# Patient Record
Sex: MALE | Race: WHITE | NOT HISPANIC OR LATINO | Employment: OTHER | URBAN - METROPOLITAN AREA
[De-identification: names, ages, dates, MRNs, and addresses within clinical notes are randomized per-mention and may not be internally consistent; named-entity substitution may affect disease eponyms.]

---

## 2017-03-10 DIAGNOSIS — I10 ESSENTIAL HYPERTENSION: ICD-10-CM

## 2017-03-10 RX ORDER — LISINOPRIL 40 MG/1
40 TABLET ORAL DAILY
Qty: 90 TABLET | Refills: 2 | Status: SHIPPED | OUTPATIENT
Start: 2017-03-10 | End: 2017-09-18

## 2017-03-10 NOTE — TELEPHONE ENCOUNTER
lisinopril      Last Written Prescription Date: 09/12/16  Last Fill Quantity: 90, # refills: 1  Last Office Visit with [facility], [facility] or [facility] prescribing provider: 12/12/16  Next 5 appointments (look out 90 days)     Mar 23, 2017  9:15 AM CDT   Return Visit with Duc Du MD   [facility] [place] ([facility])    [address] [place] [address] [id number] [phone]                   Potassium   Date Value Ref Range Status   12/16/2016 3.6 3.5 - 5.1 mmol/L Final     Creatinine   Date Value Ref Range Status   12/16/2016 0.98 0.70 - 1.30 mg/dL Final     BP Readings from Last 3 Encounters:   12/16/16 136/74   12/12/16 138/74   12/02/16 (!) 156/95

## 2017-03-16 ENCOUNTER — MYC MEDICAL ADVICE (OUTPATIENT)
Dept: INTERNAL MEDICINE | Facility: CLINIC | Age: 67
End: 2017-03-16

## 2017-03-16 DIAGNOSIS — I21.4 NSTEMI (NON-ST ELEVATED MYOCARDIAL INFARCTION) (H): ICD-10-CM

## 2017-03-16 RX ORDER — ATORVASTATIN CALCIUM 80 MG/1
80 TABLET, FILM COATED ORAL AT BEDTIME
Qty: 90 TABLET | Refills: 2 | Status: SHIPPED | OUTPATIENT
Start: 2017-03-16 | End: 2017-03-23

## 2017-03-16 RX ORDER — METOPROLOL SUCCINATE 50 MG/1
75 TABLET, EXTENDED RELEASE ORAL EVERY MORNING
Qty: 135 TABLET | Refills: 2 | Status: SHIPPED | OUTPATIENT
Start: 2017-03-16 | End: 2017-09-18

## 2017-03-23 ENCOUNTER — OFFICE VISIT (OUTPATIENT)
Dept: CARDIOLOGY | Facility: CLINIC | Age: 67
End: 2017-03-23
Attending: NURSE PRACTITIONER
Payer: COMMERCIAL

## 2017-03-23 VITALS
WEIGHT: 261.1 LBS | DIASTOLIC BLOOD PRESSURE: 70 MMHG | SYSTOLIC BLOOD PRESSURE: 130 MMHG | HEART RATE: 66 BPM | HEIGHT: 69 IN | BODY MASS INDEX: 38.67 KG/M2

## 2017-03-23 DIAGNOSIS — E78.5 HYPERLIPIDEMIA LDL GOAL <70: ICD-10-CM

## 2017-03-23 DIAGNOSIS — I21.4 NSTEMI (NON-ST ELEVATED MYOCARDIAL INFARCTION) (H): ICD-10-CM

## 2017-03-23 DIAGNOSIS — I10 ESSENTIAL HYPERTENSION: Primary | ICD-10-CM

## 2017-03-23 DIAGNOSIS — I35.0 NONRHEUMATIC AORTIC VALVE STENOSIS: ICD-10-CM

## 2017-03-23 LAB
ALT SERPL W P-5'-P-CCNC: <5 U/L (ref 5–30)
CHOLEST SERPL-MCNC: 177 MG/DL
HDLC SERPL-MCNC: 41 MG/DL
LDLC SERPL CALC-MCNC: 94 MG/DL
NONHDLC SERPL-MCNC: 136 MG/DL
TRIGL SERPL-MCNC: 210 MG/DL

## 2017-03-23 PROCEDURE — 84460 ALANINE AMINO (ALT) (SGPT): CPT | Performed by: NURSE PRACTITIONER

## 2017-03-23 PROCEDURE — 99214 OFFICE O/P EST MOD 30 MIN: CPT | Performed by: INTERNAL MEDICINE

## 2017-03-23 PROCEDURE — 36415 COLL VENOUS BLD VENIPUNCTURE: CPT | Performed by: NURSE PRACTITIONER

## 2017-03-23 PROCEDURE — 80061 LIPID PANEL: CPT | Performed by: NURSE PRACTITIONER

## 2017-03-23 RX ORDER — ROSUVASTATIN CALCIUM 20 MG/1
40 TABLET, COATED ORAL DAILY
Qty: 90 TABLET | Refills: 3 | Status: SHIPPED | OUTPATIENT
Start: 2017-03-23 | End: 2017-03-23

## 2017-03-23 RX ORDER — AMLODIPINE BESYLATE 10 MG/1
10 TABLET ORAL DAILY
Qty: 90 TABLET | Refills: 3 | Status: SHIPPED | OUTPATIENT
Start: 2017-03-23 | End: 2017-06-12

## 2017-03-23 RX ORDER — ROSUVASTATIN CALCIUM 40 MG/1
40 TABLET, COATED ORAL DAILY
Qty: 90 TABLET | Refills: 3 | Status: SHIPPED | OUTPATIENT
Start: 2017-03-23 | End: 2018-03-30

## 2017-03-23 NOTE — LETTER
3/23/2017    Sinan Stephens MD  [facility]  [address] [place] [address]    RE: Giacomo Arguelloside       Dear Colleague,    I had the pleasure of seeing Giacomo Solis in the [facility].    Mr. Solis is a pleasant 67-year-old gentleman who has been hospitalized from 11/30 to 12/02 with an NSTEMI and found to have mild to moderate aortic valve stenosis.  He underwent a cardiac catheterization that showed just moderate disease, so no intervention was performed.  He was found to have diastolic dysfunction and probably hypertension may have contributed to his mild troponin elevation, which was only at 0.05.  His statin was increased from 40 to 80 of atorvastatin; however, on recheck today his LDL still remains high 94.  He has not been on Crestor.  In the past, he has a history of atrial fibrillation but was unable to tolerate anticoagulation because of a GI bleed.  He was on Xarelto before that bleed.  His echo shows a mean gradient of around 18 with a highly calcified aortic valve.  He also has mild ascending aortic dilatation at 4.1 cm.  His blood pressure, he brings readings from home, not ideally controlled.  He reports he probably misses some of his doses in the evening, which may contribute to that.  We discussed that he could take all of his medications once a day, and there is no problem with that, so that may help with compliance.  He is not doing much in the way of diet and exercise but knows that that is important.  He recently retired.  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     Outpatient Encounter Prescriptions as of 3/23/2017   Medication Sig Dispense Refill     amLODIPine (NORVASC) 10 MG tablet Take 1 tablet (10 mg) by mouth daily 90 tablet 3     rosuvastatin (CRESTOR) 40 MG tablet Take 1 tablet (40 mg) by mouth daily 90 tablet 3      metoprolol (TOPROL-XL) 50 MG 24 hr tablet Take 1.5 tablets (75 mg) by mouth every morning 135 tablet 2     lisinopril (PRINIVIL/ZESTRIL) 40 MG tablet Take 1 tablet (40 mg) by mouth daily 90 tablet 2     furosemide (LASIX) 20 MG tablet Take 2 tablets (40 mg) by mouth every morning 180 tablet 2     [DISCONTINUED] omeprazole (PRILOSEC) 20 MG capsule Take 1 capsule (20 mg) by mouth daily 90 capsule 1     fluorouracil (EFUDEX) 5 % cream Apply topically 2 times daily Twice daily for two weeks this fall to face 40 g 0     aspirin 81 MG tablet Take 1 tablet by mouth daily.       [DISCONTINUED] rosuvastatin (CRESTOR) 20 MG tablet Take 2 tablets (40 mg) by mouth daily 90 tablet 3     [DISCONTINUED] atorvastatin (LIPITOR) 80 MG tablet Take 1 tablet (80 mg) by mouth At Bedtime 90 tablet 2     [DISCONTINUED] amLODIPine (NORVASC) 2.5 MG tablet Take 1 tablet (2.5 mg) by mouth daily 90 tablet 1     No facility-administered encounter medications on file as of 3/23/2017.       IMPRESSION, REPORT AND PLAN:   1.  Moderate coronary artery disease, medically managed without symptoms of chest pain or discomfort.   2.  Hypertension, not ideally controlled.   3.  History of GI bleed, not on anticoagulation as a result.   4.  Atrial fibrillation, paroxysmal.   5.  Hyperlipidemia, not ideally controlled currently on Lipitor 80.   6.  Obesity.   7.  Mild aortic valve stenosis with a mean gradient of 18.   8.  Ascending aortic dilatation at 4.1 cm.   9.  Obesity.      DISCUSSION:  It was a pleasure seeing Mr. Solis in Cardiology Clinic today.  I discussed his interval history since I last saw him and reviewed all of his testing.  We discussed that I would like him to have ideally better blood pressure control.  I have upped his Norvasc from 2.5 to 10, and he will keep an eye on his blood pressure.  We discussed that he can take all of his medications at once.  He does need to stagger them, which he was glad to hear.  We discussed the  importance of diet and exercise, which he understands.  From the standpoint of his aortic valve, we will plan to follow that up in a year with an echo.  We can look at his aorta at that time as well.  I did switch him from Lipitor 80 to Crestor 40 given his ongoing elevation in his LDL which is not ideally controlled.  We will plan to see him back in a year or, of course, sooner if he has any issues prior.  It certainly was a pleasure to see him.  Please do not hesitate to contact me with any questions or concerns.     Again, thank you for allowing me to participate in the care of your patient.      Sincerely,    Duc Du MD     [facility]

## 2017-03-23 NOTE — PROGRESS NOTES
HPI:     Please see dictated note    PAST MEDICAL HISTORY:  Past Medical History:   Diagnosis Date     Atrial fibrillation (H)     paroxysmal     Basal cell carcinoma      Coronary artery disease involving native coronary artery of native heart 11/30/16    NSTEMI (peak troponin 0.05); coronary angiogram showed modeerate nonocclusive disease: LM 30-40 %, LAD 30-40%, CX 30-40 %     Diverticulosis of colon 4/16/12    sigmoid diverticulosis per CT scan; episode acute diverticulitis per CT scan     Heart valve insufficiency      Lipoma of other skin and subcutaneous tissue      Lumbar spinal stenosis 9/1/10    lumbar spinal stenosis at L5     NSTEMI (non-ST elevated myocardial infarction) (H) 11/30/16    NSTEMI (peak troponin 0.05); no acute lesions on angiogram     Obesity (BMI 30-39.9) 7/9/13    BMI 38     Obstructive sleep apnea 10/7/14    per sleep study:  AHI 25/hr, desats to 84%. Supine /hr, only 6 minutes spent supine     Other and unspecified hyperlipidemia      Prediabetes 7/9/13    elevated fasting glucoses     Tubular adenoma of colon 8/13/13    3 adenomatous poltyps removed, 4 hyperplastic polyps removed     Unspecified essential hypertension        CURRENT MEDICATIONS:  Current Outpatient Prescriptions   Medication Sig Dispense Refill     atorvastatin (LIPITOR) 80 MG tablet Take 1 tablet (80 mg) by mouth At Bedtime 90 tablet 2     metoprolol (TOPROL-XL) 50 MG 24 hr tablet Take 1.5 tablets (75 mg) by mouth every morning 135 tablet 2     lisinopril (PRINIVIL/ZESTRIL) 40 MG tablet Take 1 tablet (40 mg) by mouth daily 90 tablet 2     amLODIPine (NORVASC) 2.5 MG tablet Take 1 tablet (2.5 mg) by mouth daily 90 tablet 1     furosemide (LASIX) 20 MG tablet Take 2 tablets (40 mg) by mouth every morning 180 tablet 2     omeprazole (PRILOSEC) 20 MG capsule Take 1 capsule (20 mg) by mouth daily 90 capsule 1     fluorouracil (EFUDEX) 5 % cream Apply topically 2 times daily Twice daily for two weeks this fall to  "face 40 g 0     aspirin 81 MG tablet Take 1 tablet by mouth daily.         PAST SURGICAL HISTORY:  Past Surgical History:   Procedure Laterality Date     COLONOSCOPY  8/13/13    3 adenomatous polyps and 4 hyperplastic polyps removed     HC TOOTH EXTRACTION W/FORCEP  1983    4 wisdom teeth removed withou difficulty       ALLERGIES     Allergies   Allergen Reactions     No Known Allergies        FAMILY HISTORY:  Family History   Problem Relation Age of Onset     Hypertension Mother      B:1920     HEART DISEASE Father      D: 70's  MI     Hypertension Brother      lipids as well     Hypertension Brother      Respiratory Brother      PRACHI     DIABETES No family hx of      CEREBROVASCULAR DISEASE No family hx of        SOCIAL HISTORY:  Social History     Social History     Marital status:      Spouse name: N/A     Number of children: N/A     Years of education: N/A     Social History Main Topics     Smoking status: Former Smoker     Packs/day: 0.75     Years: 20.00     Types: Cigarettes     Quit date: 5/11/2006     Smokeless tobacco: Never Used      Comment: had quit for 1 year, smoked on and off for years, quit again 5/06     Alcohol use 2.4 oz/week     0 Standard drinks or equivalent, 4 Glasses of wine per week      Comment: 2 glasses of wine daily      Drug use: No     Sexual activity: No     Other Topics Concern     Special Diet No     Exercise No     Social History Narrative       ROS:   Constitutional: No fever, chills, or sweats. No weight gain/loss   ENT: No visual disturbance, ear ache, epistaxis, sore throat  Allergies/Immunologic: Negative.   Respiratory: No cough, hemoptysia  Cardiovascular: As per HPI  GI: No nausea, vomiting, hematemesis, melena, or hematochezia  : No urinary frequency, dysuria, or hematuria  Integument: Negative  Psychiatric: Negative  Neuro: Negative  Endocrinology: Negative   Musculoskeletal: Negative    EXAM:  /70  Pulse 66  Ht 1.753 m (5' 9\")  Wt 118.4 kg (261 lb 1.6 " oz)  BMI 38.56 kg/m2  In general, the patient is a pleasant male in no apparent distress.    HEENT: NC/AT.  PERRLA.  EOMI.  Sclerae white, not injected.  Nares clear.  Pharynx without erythema or exudate.  Dentition intact.    Neck:  Carotids +4/4 bilaterally without bruits.  No jugular venous distension.   Heart: RRR. Normal S1, S2 splits physiologically. Lungs: CTA.  No ronchi, wheezes, rales.    Abdomen: Soft, nontender, nondistended.  Extremities: No clubbing, cyanosis, or edema. Neurologic: Alert and oriented to person/place/time, normal speech, gait and affect  Skin: No petechiae, purpura or rash.    Labs:  LIPID RESULTS:  Lab Results   Component Value Date    CHOL 177 03/23/2017    HDL 41 03/23/2017    LDL 94 03/23/2017    TRIG 210 (H) 03/23/2017    CHOLHDLRATIO 5.1 (H) 06/23/2015    NHDL 136 (H) 03/23/2017       LIVER ENZYME RESULTS:  Lab Results   Component Value Date    AST 24 11/30/2016    ALT <5 (L) 03/23/2017       CBC RESULTS:  Lab Results   Component Value Date    WBC 6.5 11/30/2016    RBC 5.13 11/30/2016    HGB 15.1 11/30/2016    HCT 44.8 11/30/2016    MCV 87 11/30/2016    MCH 29.4 11/30/2016    MCHC 33.7 11/30/2016    RDW 13.9 11/30/2016     11/30/2016       BMP RESULTS:  Lab Results   Component Value Date     12/16/2016    POTASSIUM 3.6 12/16/2016    CHLORIDE 103 12/16/2016    CO2 29 12/16/2016    ANIONGAP 11.6 12/16/2016     (H) 12/16/2016    BUN 13 12/16/2016    CR 0.98 12/16/2016    GFRESTIMATED 77 12/16/2016    GFRESTBLACK >90 12/16/2016    LASHAY 8.8 12/16/2016        A1C RESULTS:  Lab Results   Component Value Date    A1C 6.4 (H) 10/21/2016       INR RESULTS:  Lab Results   Component Value Date    INR 0.96 11/05/2013     ESTER Du MD     CC  Patient Care Team:  Ba Stephens MD as PCP - General  BA STEPHENS

## 2017-03-24 NOTE — PROGRESS NOTES
HISTORY OF PRESENT ILLNESS:  Mr. Solis is a pleasant 67-year-old gentleman who has been hospitalized from 11/30 to 12/02 with an NSTEMI and found to have mild to moderate aortic valve stenosis.  He underwent a cardiac catheterization that showed just moderate disease, so no intervention was performed.  He was found to have diastolic dysfunction and probably hypertension may have contributed to his mild troponin elevation, which was only at 0.05.  His statin was increased from 40 to 80 of atorvastatin; however, on recheck today his LDL still remains high 94.  He has not been on Crestor.  In the past, he has a history of atrial fibrillation but was unable to tolerate anticoagulation because of a GI bleed.  He was on Xarelto before that bleed.  His echo shows a mean gradient of around 18 with a highly calcified aortic valve.  He also has mild ascending aortic dilatation at 4.1 cm.  His blood pressure, he brings readings from home, not ideally controlled.  He reports he probably misses some of his doses in the evening, which may contribute to that.  We discussed that he could take all of his medications once a day, and there is no problem with that, so that may help with compliance.  He is not doing much in the way of diet and exercise but knows that that is important.  He recently retired.  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      IMPRESSION, REPORT AND PLAN:   1.  Moderate coronary artery disease, medically managed without symptoms of chest pain or discomfort.   2.  Hypertension, not ideally controlled.   3.  History of GI bleed, not on anticoagulation as a result.   4.  Atrial fibrillation, paroxysmal.   5.  Hyperlipidemia, not ideally controlled currently on Lipitor 80.   6.  Obesity.   7.  Mild aortic valve stenosis with a mean gradient of 18.   8.  Ascending aortic dilatation at 4.1 cm.   9.   Obesity.      DISCUSSION:  It was a pleasure seeing Mr. Solis in Cardiology Clinic today.  I discussed his interval history since I last saw him and reviewed all of his testing.  We discussed that I would like him to have ideally better blood pressure control.  I have upped his Norvasc from 2.5 to 10, and he will keep an eye on his blood pressure.  We discussed that he can take all of his medications at once.  He does need to stagger them, which he was glad to hear.  We discussed the importance of diet and exercise, which he understands.  From the standpoint of his aortic valve, we will plan to follow that up in a year with an echo.  We can look at his aorta at that time as well.  I did switch him from Lipitor 80 to Crestor 40 given his ongoing elevation in his LDL which is not ideally controlled.  We will plan to see him back in a year or, of course, sooner if he has any issues prior.  It certainly was a pleasure to see him.  Please do not hesitate to contact me with any questions or concerns.         SADI THIBODEAUX MD             D: 2017 09:47   T: 2017 22:31   MT: BENITO      Name:     TISHA SOLIS   MRN:      [id number]        Account:      [id number]   :      1950           Service Date: 2017      Document: [id number]

## 2017-04-05 DIAGNOSIS — K21.9 GASTROESOPHAGEAL REFLUX DISEASE WITHOUT ESOPHAGITIS: ICD-10-CM

## 2017-04-05 NOTE — TELEPHONE ENCOUNTER
omeprazole      Last Written Prescription Date: 10/05/16  Last Fill Quantity: 90,  # refills: 1   Last Office Visit with G, P or [facility] prescribing provider: 12/12/16

## 2017-06-12 DIAGNOSIS — I10 ESSENTIAL HYPERTENSION: ICD-10-CM

## 2017-06-12 RX ORDER — AMLODIPINE BESYLATE 10 MG/1
10 TABLET ORAL DAILY
Qty: 90 TABLET | Refills: 3 | Status: SHIPPED | OUTPATIENT
Start: 2017-06-12 | End: 2018-07-10

## 2017-06-17 ENCOUNTER — HEALTH MAINTENANCE LETTER (OUTPATIENT)
Age: 67
End: 2017-06-17

## 2017-08-02 ENCOUNTER — TRANSFERRED RECORDS (OUTPATIENT)
Dept: HEALTH INFORMATION MANAGEMENT | Facility: CLINIC | Age: 67
End: 2017-08-02

## 2017-08-29 ENCOUNTER — MYC MEDICAL ADVICE (OUTPATIENT)
Dept: INTERNAL MEDICINE | Facility: CLINIC | Age: 67
End: 2017-08-29

## 2017-08-29 DIAGNOSIS — I10 ESSENTIAL HYPERTENSION: Primary | ICD-10-CM

## 2017-08-29 DIAGNOSIS — I21.4 NSTEMI (NON-ST ELEVATED MYOCARDIAL INFARCTION) (H): ICD-10-CM

## 2017-08-29 DIAGNOSIS — Z11.59 NEED FOR HEPATITIS C SCREENING TEST: ICD-10-CM

## 2017-08-29 DIAGNOSIS — K21.9 GASTROESOPHAGEAL REFLUX DISEASE WITHOUT ESOPHAGITIS: ICD-10-CM

## 2017-08-29 DIAGNOSIS — E78.5 HYPERLIPIDEMIA LDL GOAL <70: ICD-10-CM

## 2017-08-29 DIAGNOSIS — R60.0 BILATERAL LOWER EXTREMITY EDEMA: ICD-10-CM

## 2017-08-29 DIAGNOSIS — Z12.5 SPECIAL SCREENING FOR MALIGNANT NEOPLASM OF PROSTATE: ICD-10-CM

## 2017-09-06 DIAGNOSIS — R73.03 PREDIABETES: ICD-10-CM

## 2017-09-06 DIAGNOSIS — Z11.59 NEED FOR HEPATITIS C SCREENING TEST: ICD-10-CM

## 2017-09-06 DIAGNOSIS — E78.5 HYPERLIPIDEMIA LDL GOAL <130: ICD-10-CM

## 2017-09-06 DIAGNOSIS — Z12.5 SPECIAL SCREENING FOR MALIGNANT NEOPLASM OF PROSTATE: ICD-10-CM

## 2017-09-06 DIAGNOSIS — E66.01 SEVERE OBESITY (BMI 35.0-35.9 WITH COMORBIDITY) (H): ICD-10-CM

## 2017-09-06 LAB
ALBUMIN SERPL-MCNC: 3.7 G/DL (ref 3.4–5)
ALP SERPL-CCNC: 72 U/L (ref 40–150)
ALT SERPL W P-5'-P-CCNC: 32 U/L (ref 0–70)
ANION GAP SERPL CALCULATED.3IONS-SCNC: 8 MMOL/L (ref 3–14)
AST SERPL W P-5'-P-CCNC: 19 U/L (ref 0–45)
BILIRUB SERPL-MCNC: 1.1 MG/DL (ref 0.2–1.3)
BUN SERPL-MCNC: 16 MG/DL (ref 7–30)
CALCIUM SERPL-MCNC: 8.8 MG/DL (ref 8.5–10.1)
CHLORIDE SERPL-SCNC: 105 MMOL/L (ref 94–109)
CHOLEST SERPL-MCNC: 140 MG/DL
CO2 SERPL-SCNC: 27 MMOL/L (ref 20–32)
CREAT SERPL-MCNC: 0.75 MG/DL (ref 0.66–1.25)
ERYTHROCYTE [DISTWIDTH] IN BLOOD BY AUTOMATED COUNT: 13.9 % (ref 10–15)
GFR SERPL CREATININE-BSD FRML MDRD: >90 ML/MIN/1.7M2
GLUCOSE SERPL-MCNC: 113 MG/DL (ref 70–99)
HBA1C MFR BLD: 6.1 % (ref 4.3–6)
HCT VFR BLD AUTO: 43.3 % (ref 40–53)
HDLC SERPL-MCNC: 45 MG/DL
HGB BLD-MCNC: 14.1 G/DL (ref 13.3–17.7)
LDLC SERPL CALC-MCNC: 53 MG/DL
MCH RBC QN AUTO: 28.7 PG (ref 26.5–33)
MCHC RBC AUTO-ENTMCNC: 32.6 G/DL (ref 31.5–36.5)
MCV RBC AUTO: 88 FL (ref 78–100)
NONHDLC SERPL-MCNC: 95 MG/DL
PLATELET # BLD AUTO: 222 10E9/L (ref 150–450)
POTASSIUM SERPL-SCNC: 3.5 MMOL/L (ref 3.4–5.3)
PROT SERPL-MCNC: 7.1 G/DL (ref 6.8–8.8)
PSA SERPL-ACNC: 0.7 UG/L (ref 0–4)
RBC # BLD AUTO: 4.91 10E12/L (ref 4.4–5.9)
SODIUM SERPL-SCNC: 140 MMOL/L (ref 133–144)
TRIGL SERPL-MCNC: 208 MG/DL
TSH SERPL DL<=0.005 MIU/L-ACNC: 1.88 MU/L (ref 0.4–4)
WBC # BLD AUTO: 6.4 10E9/L (ref 4–11)

## 2017-09-06 PROCEDURE — 83036 HEMOGLOBIN GLYCOSYLATED A1C: CPT | Performed by: INTERNAL MEDICINE

## 2017-09-06 PROCEDURE — 80053 COMPREHEN METABOLIC PANEL: CPT | Performed by: INTERNAL MEDICINE

## 2017-09-06 PROCEDURE — 36415 COLL VENOUS BLD VENIPUNCTURE: CPT | Performed by: INTERNAL MEDICINE

## 2017-09-06 PROCEDURE — 85027 COMPLETE CBC AUTOMATED: CPT | Performed by: INTERNAL MEDICINE

## 2017-09-06 PROCEDURE — 84443 ASSAY THYROID STIM HORMONE: CPT | Performed by: INTERNAL MEDICINE

## 2017-09-06 PROCEDURE — G0103 PSA SCREENING: HCPCS | Performed by: INTERNAL MEDICINE

## 2017-09-06 PROCEDURE — 86803 HEPATITIS C AB TEST: CPT | Performed by: INTERNAL MEDICINE

## 2017-09-06 PROCEDURE — 80061 LIPID PANEL: CPT | Performed by: INTERNAL MEDICINE

## 2017-09-07 LAB — HCV AB SERPL QL IA: NONREACTIVE

## 2017-09-18 ENCOUNTER — OFFICE VISIT (OUTPATIENT)
Dept: INTERNAL MEDICINE | Facility: CLINIC | Age: 67
End: 2017-09-18
Payer: COMMERCIAL

## 2017-09-18 VITALS
BODY MASS INDEX: 38.26 KG/M2 | WEIGHT: 259.1 LBS | SYSTOLIC BLOOD PRESSURE: 126 MMHG | TEMPERATURE: 98.6 F | OXYGEN SATURATION: 96 % | HEART RATE: 73 BPM | DIASTOLIC BLOOD PRESSURE: 70 MMHG

## 2017-09-18 DIAGNOSIS — I10 ESSENTIAL HYPERTENSION: ICD-10-CM

## 2017-09-18 DIAGNOSIS — R73.03 PREDIABETES: ICD-10-CM

## 2017-09-18 DIAGNOSIS — I21.4 NSTEMI (NON-ST ELEVATED MYOCARDIAL INFARCTION) (H): Primary | ICD-10-CM

## 2017-09-18 DIAGNOSIS — I48.0 PAROXYSMAL ATRIAL FIBRILLATION (H): ICD-10-CM

## 2017-09-18 DIAGNOSIS — E66.01 SEVERE OBESITY (BMI 35.0-39.9) WITH COMORBIDITY (H): ICD-10-CM

## 2017-09-18 DIAGNOSIS — D12.6 TUBULAR ADENOMA OF COLON: ICD-10-CM

## 2017-09-18 DIAGNOSIS — Z23 NEED FOR VACCINATION: ICD-10-CM

## 2017-09-18 DIAGNOSIS — G47.33 OSA (OBSTRUCTIVE SLEEP APNEA): ICD-10-CM

## 2017-09-18 DIAGNOSIS — I25.10 CORONARY ARTERY DISEASE INVOLVING NATIVE CORONARY ARTERY OF NATIVE HEART WITHOUT ANGINA PECTORIS: ICD-10-CM

## 2017-09-18 DIAGNOSIS — Z23 NEED FOR PROPHYLACTIC VACCINATION AND INOCULATION AGAINST INFLUENZA: ICD-10-CM

## 2017-09-18 PROCEDURE — 99214 OFFICE O/P EST MOD 30 MIN: CPT | Mod: 25 | Performed by: INTERNAL MEDICINE

## 2017-09-18 PROCEDURE — G0009 ADMIN PNEUMOCOCCAL VACCINE: HCPCS | Mod: 59 | Performed by: INTERNAL MEDICINE

## 2017-09-18 PROCEDURE — 90662 IIV NO PRSV INCREASED AG IM: CPT | Performed by: INTERNAL MEDICINE

## 2017-09-18 PROCEDURE — 90715 TDAP VACCINE 7 YRS/> IM: CPT | Performed by: INTERNAL MEDICINE

## 2017-09-18 PROCEDURE — G0008 ADMIN INFLUENZA VIRUS VAC: HCPCS | Mod: 59 | Performed by: INTERNAL MEDICINE

## 2017-09-18 PROCEDURE — 90670 PCV13 VACCINE IM: CPT | Performed by: INTERNAL MEDICINE

## 2017-09-18 PROCEDURE — 90472 IMMUNIZATION ADMIN EACH ADD: CPT | Performed by: INTERNAL MEDICINE

## 2017-09-18 RX ORDER — LISINOPRIL 40 MG/1
40 TABLET ORAL DAILY
Qty: 90 TABLET | Refills: 3 | Status: SHIPPED | OUTPATIENT
Start: 2017-09-18 | End: 2018-09-05

## 2017-09-18 RX ORDER — METOPROLOL SUCCINATE 50 MG/1
75 TABLET, EXTENDED RELEASE ORAL EVERY MORNING
Qty: 135 TABLET | Refills: 3 | Status: SHIPPED | OUTPATIENT
Start: 2017-09-18 | End: 2018-09-21

## 2017-09-18 NOTE — NURSING NOTE
"Chief Complaint   Patient presents with     Hypertension     med rehceck, review recent labs     Lipids     med rehceck, review recent labs       Initial /70  Pulse 73  Temp 98.6  F (37  C) (Oral)  Wt 259 lb 1.6 oz (117.5 kg)  SpO2 96%  BMI 38.26 kg/m2 Estimated body mass index is 38.26 kg/(m^2) as calculated from the following:    Height as of 3/23/17: 5' 9\" (1.753 m).    Weight as of this encounter: 259 lb 1.6 oz (117.5 kg).  Medication Reconciliation: complete   Trudy Rajan CMA    Screening Questionnaire for Adult Immunization    Are you sick today?   No   Do you have allergies to medications, food, a vaccine component or latex?   No   Have you ever had a serious reaction after receiving a vaccination?   No   Do you have a long-term health problem with heart disease, lung disease, asthma, kidney disease, metabolic disease (e.g. diabetes), anemia, or other blood disorder?   Yes   Do you have cancer, leukemia, HIV/AIDS, or any other immune system problem?   YES   In the past 3 months, have you taken medications that affect  your immune system, such as prednisone, other steroids, or anticancer drugs; drugs for the treatment of rheumatoid arthritis, Crohn s disease, or psoriasis; or have you had radiation treatments?   No   Have you had a seizure, or a brain or other nervous system problem?   No   During the past year, have you received a transfusion of blood or blood     products, or been given immune (gamma) globulin or antiviral drug?   No   For women: Are you pregnant or is there a chance you could become        pregnant during the next month?   No   Have you received any vaccinations in the past 4 weeks?   No     Immunization questionnaire was positive for at least one answer.  Notified Angelo.        Per orders of Dr. Collier, injection of TDAP and prevnar given by Trudy Rajan. Patient instructed to remain in clinic for 15 minutes afterwards, and to report any adverse reaction to me " immediately.       Screening performed by Trudy Rajan on 9/18/2017 at 8:14 AM.

## 2017-09-18 NOTE — MR AVS SNAPSHOT
"              After Visit Summary   9/18/2017    Giacomo LANIER [place]    MRN: [id number]           Patient Information     Date Of Birth          1950        Visit Information        Provider Department      9/18/2017 8:00 AM Sinan Stephens MD [facility]        Today's Diagnoses     NSTEMI (non-ST elevated myocardial infarction) (H)    -  1    Severe obesity (BMI 35.0-39.9) with comorbidity (H)        Paroxysmal atrial fibrillation (H)        Prediabetes        Essential hypertension        Tubular adenoma of colon        PRACHI (obstructive sleep apnea)        Need for vaccination        Need for prophylactic vaccination and inoculation against influenza        Coronary artery disease involving native coronary artery of native heart without angina pectoris          Care Instructions    *  Continue all medications at the same doses.  Contact your usual pharmacy if you need refills.     *  Vaccines given today:  Tetnus, annual influenza, pneumonia vaccine.     *  Return to see me in 1 year, sooner if needed.  Please get fasting labs done at the [facility] or any other [facility] lab 1-2 days before this appointment.  If you get the labs done at another [facility], make arrangements with them directly.  The orders will be in place.  Eat nothing for at least 8 hours prior to having these labs drawn.  Call [phone] to schedule appointments at [facility].       *  Follow up with Cardiology Clinic this spring when you return from [place].              5 GOALS TO PREVENT VASCULAR DISEASE:     1.  Aggressive blood pressure control, under 130/80 ideally.  Using medications if needed.    Your blood pressure is under good control    BP Readings from Last 4 Encounters:   09/18/17 126/70   03/23/17 130/70   12/16/16 136/74   12/12/16 138/74       2.  Aggressive LDL cholesterol (\"bad cholesterol\") lowering as indicated.    Your goal is an LDL under 130 for sure, " "preferably under 100.  (If you have diabetes or previous vascular disease, the the LDL goals would be under 100 for sure, preferably under 70.)    New guidelines identify four high-risk groups who could benefit from statins:   *people with pre-existing heart disease, such as those who have had a heart attack;   *people ages 40 to 75 who have diabetes of any type  *patients ages 40 to 75 with at least a 7.5% risk of developing cardiovascular disease over the next decade, according to a formula described in the guidelines  *patients with the sort of super-high cholesterol that sometimes runs in families, as evidenced by an LDL of 190 milligrams per deciliter or higher    Your cholesterol levels are well controlled.    Recent Labs   Lab Test  09/06/17   0806  03/23/17   0814   06/23/15   0803  06/25/14   0712   CHOL  140  177   < >  184  188   HDL  45  41   < >  36*  39*   LDL  53  94   < >  89  105   TRIG  208*  210*   < >  295*  218*   CHOLHDLRATIO   --    --    --   5.1*  4.8    < > = values in this interval not displayed.       3.  Aggressive diabetic prevention, screening and/or management.      You do not have diabetes as of the most recent blood tests.     4.  No smoking    5.  Consider taking low dose aspirin (81 mg) tablet once per day over the age of 50, every day unless there is a specific reason that you cannot take aspirin (such as side effect, allergy, or you are on another \"blood thinner\").        --Based on your current cardiac risk factors, you should take Aspirin 81 mg once per day if you are over 50 years of age.            --Good Grains:  Oats, brown rice, Quinoa (these do not raise the blood sugar as much)     --Bad grains:  Anything made from wheat or white rice     (because these raise the blood sugars significantly, and the possible gluten issue from wheat for some people).      --Proteins:  Aim for \"lean proteins\" including chicken, fish, seafood, pork, turkey, and eggs (in moderation); Eat red " "meat only occasionally          Dino Glasgow:                  *  OBESITY/WEIGHT LOSS:  ====================================================    *  Obesity is a major problem in this country.  Over 2/3 of adult Americans are overweight (BMI Over 25), and 1/3 are obese (BMI over 30)    *  Obesity is the leading cause of diabetes type II, which currently affects 1 out of 10 adult Americans and is projected to potentially affect 1 out of 3 adult Americans by 2040    *  Chronic obesity leads to \"insulin resistance\" which affects the carbohydrate (sugar) metabolism causing \"diabetisy\" which leads to type II Diabetes, increased visceral fat, a chronic low grade inflammatory state that leads to higher rates of vascular disease among other things.     *  Obesity is defined as BMI (body mass index) greater than 30.    *  Morbid obesity is defined as BMI over 40    Your most recent Body mass index is:  Body mass index is 38.26 kg/(m^2).    The main principles in weight loss are diet and exercise. You need to have both.      Dietary principles are aimed at keeping the fat content in your diet to less than 30% of total calories AND minimizing the simple carbohydrates (breads, sugars, pasta, etc.).  Complex carbohydrates such as wild rice, brown rice, legumes and most vegetables are OK.    *  Think \"Eat like a caveman\", eat \"primitive\" foods.    *  Keep your food shopping to the outside of the grocery store, do not eat foods that come from a bag or box, do not eat foods with bar codes.    *  Use the fork rule for all eating. [If you can't pick food up with a fork, then the food will not turn off hunger very well. Using this rule helps patients avoid choosing the wrong types of food, especially if you have had a bariatric surgery operation.   The \"wrong foods\" include liquid calories such as soup, juice, soda, slimfast, ice cream, and beer, crumbly foods such as chips, crackers, and cookies, and starch based foods such as pasta, " "too much rice, and too much bread.]     * Keep your calorie intake at least less than 1500 per day to start (under 1300 total if you want to be more aggressive), divided between 3 meals (try to have no meal more than 500 calories) and 2 snacks.      *  \"Learn what 500 calories looks like\" and try to keep all meals under 500 calories.      *  Drink one large glass of water BEFORE each meal.  This not only helps guard against dehydration, but it also helps provide additional \"fullness\" that will help reduce the amount of food that you will consume in a given meal by helping you fill up earlier.      *  Figure out what kind of calories you are taking in now at this time.  It is hard to change behaviors that you do not understand.      *  Eat breakfast every day.  Skipping meals has been shown to be one of the factors that correlates the highest with obesity, (even more than fast food).  Try to avoid the typical carbohydrates and sugars that dominate the typical American breakfast.  Try to get more protein in earlier in the day, this will make you less hungry later.       *  Try High Protein Ensure or Boost nutritional supplements (or similar versions) for breakfast if nothing else.      *  Avoid \"manufactured foods\" as much as possible.  My definition of a \"manufactured\" or \"processed\" food is any single food product that has more than 6 ingredients.     *  Keep your grocery shopping to the \"outside\" of the grocery store, this is where all the \"real food\" is located.      *  Try to limit all simple carbohydrate foods such as white breads (whole grain natural breads are OK), white rice (brown rice and wild rice are OK), pasta, noodles, \"snack foods\", candy, jam/jellies, cakes, pies, sweets, regular soda (sugar free is OK).    *  Limit the amount of citrus fruits such as oranges, dora, grapefruit.  These contain a large amount of sugars and will raise your blood sugars very high.  Try to keep less than 3 pieces of " "fruit per day.  Grapefruits specifically can interfere with the metabolism of \"statin\" cholesterol lowering drugs and therefore should be avoided completely if you are on a statin medication.      *  Always eat three meals a day every day:  breakfast, lunch, and supper.    *  I do not recommend over the counter diet aids such as Metabolife or Dexatrim since they have a high risk of side effects mainly fast heart rate, insomnia, and nervousness.    *  Very hard to lose weight with diet changes alone.  You need to have regular exercise.  You should aim for either 3 hours per week total of cumulative physical aerobic activity or 10,000 steps per day of activity.  Buy a pedometer and keep track of your activity.  If your typical daily activity does not add up to 10,000 total steps, then make up the difference with walking or some sort of aerobic activity.          Good resources for nutrition are:    ---> \"Wheat Belly\" by Celestine Croft  (a book about the many bad things processed wheat products (i.e. Bread) have caused in our country...which I believe has serious merit)       --->  \"The Paleo Diet\"  By Demetria Benítez.             --->\"In Defense of Food\"  Of \"Food Rules\" (Romero Glasgow) a book that goes into the causes obesity epidemic in our country    Dino Glasgow:                    ---> \"Picture Perfect Weight Loss\" by Santosh Mccann (another good book about choices)        ---> \"Eat This, Not That\" Series,  by Tyler Kelly  (a book about food choices in the modern world)              ---> \"The Blood Sugar Solution\" by Denny Argueta ( a book about obesity and insulin resistance leading to \"diabesity\")           Karina Wynn ( a guidebook for counting calories, and knowing the components of the food that you eat)       \"The Best Life Diet\"  (a complete diet program)             Aim for a Healthy Weight Web Page at www.nhlbi.nih.gov       [place] Diet       Hunterkori Blake:  \"Eat More, Weigh Less\" or \"The Program " "for the Reversal of Heart Disease\"       [facility] web site  the food and nutrition link.       American Heart Association       American Diabetes Association (www.diabetes.org)                      Follow-ups after your visit        Who to contact     If you have questions or need follow up information about today's clinic visit or your schedule please contact [facility] directly at [phone].  Normal or non-critical lab and imaging results will be communicated to you by MyChart, letter or phone within 4 business days after the clinic has received the results. If you do not hear from us within 7 days, please contact the clinic through [employer] or phone. If you have a critical or abnormal lab result, we will notify you by phone as soon as possible.  Submit refill requests through [employer] or call your pharmacy and they will forward the refill request to us. Please allow 3 business days for your refill to be completed.          Additional Information About Your Visit        [employer] Information     [employer] gives you secure access to your electronic health record. If you see a primary care provider, you can also send messages to your care team and make appointments. If you have questions, please call your primary care clinic.  If you do not have a primary care provider, please call [phone] and they will assist you.        Care EveryWhere ID     This is your Care EveryWhere ID. This could be used by other organizations to access your [place] medical records  [id number]        Your Vitals Were     Pulse Temperature Pulse Oximetry BMI (Body Mass Index)          73 98.6  F (37  C) (Oral) 96% 38.26 kg/m2         Blood Pressure from Last 3 Encounters:   09/18/17 126/70   03/23/17 130/70   12/16/16 136/74    Weight from Last 3 Encounters:   09/18/17 259 lb 1.6 oz (117.5 kg)   03/23/17 261 lb 1.6 oz (118.4 kg)   12/16/16 260 lb 4.8 oz (118.1 kg)              We Performed the Following     " ADMIN INFLUENZA (For MEDICARE Patients ONLY) []     FLU VACCINE, INCREASED ANTIGEN, PRESV FREE, AGE 65+ [37720]     INJECTION INTRAMUSCULAR OR SUB-Q     PNEUMOCOCCAL CONJ VACCINE 13 VALENT IM     TDAP VACCINE (ADACEL)          Where to get your medicines      These medications were sent to [email or web address] - [facility] [address] AT [facility] [address]  [address], [facility] [address]    Hours:  24-hours Phone:  [phone]     lisinopril 40 MG tablet    metoprolol 50 MG 24 hr tablet          Primary Care Provider Office Phone # Fax #    Sinan Stephens [phone]       [address] [facility] [address]        Equal Access to Services     LEVON COULTER : Hadii aad ku hadasho Soomaali, waaxda luqadaha, qaybta kaalmada adeegyada, waxay isaiin hayaan candy ken . So [facility] [phone].    ATENCIÓN: Si habla español, tiene a galindo disposición servicios gratuitos de asistencia lingüística. Llame al [phone].    We comply with applicable federal civil rights laws and [place] laws. We do not discriminate on the basis of race, color, national origin, age, disability sex, sexual orientation or gender identity.            Thank you!     Thank you for choosing [facility]  for your care. Our goal is always to provide you with excellent care. Hearing back from our patients is one way we can continue to improve our services. Please take a few minutes to complete the written survey that you may receive in the mail after your visit with us. Thank you!             Your Updated Medication List - Protect others around you: Learn how to safely use, store and throw away your medicines at www.disposemymeds.org.          This list is accurate as of: 9/18/17  8:41 AM.  Always use your most recent med list.                   Brand Name Dispense Instructions for use Diagnosis    amLODIPine 10 MG tablet    NORVASC    90 tablet    Take 1  tablet (10 mg) by mouth daily    Essential hypertension       aspirin 81 MG tablet      Take 1 tablet by mouth daily.    Unspecified essential hypertension       fluorouracil 5 % cream    EFUDEX    40 g    Apply topically 2 times daily Twice daily for two weeks this fall to face    AK (actinic keratosis)       furosemide 20 MG tablet    LASIX    180 tablet    Take 2 tablets (40 mg) by mouth every morning    Bilateral lower extremity edema       lisinopril 40 MG tablet    PRINIVIL/ZESTRIL    90 tablet    Take 1 tablet (40 mg) by mouth daily    Essential hypertension       metoprolol 50 MG 24 hr tablet    TOPROL-XL    135 tablet    Take 1.5 tablets (75 mg) by mouth every morning    NSTEMI (non-ST elevated myocardial infarction) (H)       omeprazole 20 MG CR capsule    priLOSEC    90 capsule    TAKE 1 CAPSULE(20 MG) BY MOUTH DAILY    Gastroesophageal reflux disease without esophagitis       rosuvastatin 40 MG tablet    CRESTOR    90 tablet    Take 1 tablet (40 mg) by mouth daily    Hyperlipidemia LDL goal <70

## 2017-09-18 NOTE — PROGRESS NOTES
SUBJECTIVE:   Giacomo Solis is a 67 year old male who presents to clinic today for the following health issues:          Hyperlipidemia Follow-Up      Rate your low fat/cholesterol diet?: good    Taking statin?  Yes, no muscle aches from statin    Other lipid medications/supplements?:  None    Has history of hyperlipidemia.  On statin for this, denies any significant side effects of this medication.      Latest labs reviewed:    Recent Labs   Lab Test  09/06/17   0806  03/23/17   0814   06/23/15   0803  06/25/14   0712   CHOL  140  177   < >  184  188   HDL  45  41   < >  36*  39*   LDL  53  94   < >  89  105   TRIG  208*  210*   < >  295*  218*   CHOLHDLRATIO   --    --    --   5.1*  4.8    < > = values in this interval not displayed.        Lab Results   Component Value Date    AST 19 09/06/2017         Hypertension Follow-up      Outpatient blood pressures are being checked at home.  Results are 106-120/60+.    Low Salt Diet: low salt    Blood presure remains well controlled at home  Readings outside clinic are within normal limits.  Reviewed last 6 BP readings in chart:  BP Readings from Last 6 Encounters:   09/18/17 126/70   03/23/17 130/70   12/16/16 136/74   12/12/16 138/74   12/02/16 (!) 156/95   11/30/16 (!) 140/98     He has not experienced any significant side effects from medicaiotns for hypertension.    NO active cardiac complaints or symptoms with exercise.       3.  Prior of coronary artery disease as listed in medical history.  No current or recent cardiovascaulr symptoms.   No shortness of breath, no episodes of chest pain/pressure, no dyspnea on exertion, no changes in his abiliy to perform physical exertion or tasks.  Takes the same amount of time to perform similar physical tasks.      4.  The patient has a history of impaired glucose tolerance with regularly elevated blood sugars.    They have not been diagnosed with type II DM or placed on medications for diabetes before.   They deny  polyuria, polydipsia.     The patient is obese with a BMI of Body mass index is 38.26 kg/(m^2)..    Review of current labs show:    Lab Results   Component Value Date    A1C 6.1 09/06/2017    A1C 6.4 10/21/2016    A1C 6.4 02/22/2016    A1C 6.4 06/23/2015    A1C 6.2 01/23/2015    A1C 6.1 10/30/2014    A1C 6.2 07/25/2014    A1C 5.9 03/15/2013    A1C 6.1 01/10/2012    A1C 6.1 10/28/2010    A1C 5.9 07/14/2009    A1C 5.7 08/31/2007     @bgl@     5.    The patient has had a history of ongoing obesity.  Reviewed the weigth curves.   Their current BMI is:  Body mass index is 38.26 kg/(m^2).  Reviewed previous attempts at weight loss which have not been successful in producing prolonged weigth loss.   Discussed current eating and exercise habits.     Reviewed weights in chart:  Wt Readings from Last 10 Encounters:   09/18/17 259 lb 1.6 oz (117.5 kg)   03/23/17 261 lb 1.6 oz (118.4 kg)   12/16/16 260 lb 4.8 oz (118.1 kg)   12/12/16 254 lb 11.2 oz (115.5 kg)   12/02/16 259 lb 7.7 oz (117.7 kg)   11/13/16 259 lb (117.5 kg)   10/27/16 256 lb 2 oz (116.2 kg)   10/04/16 257 lb (116.6 kg)   09/20/16 254 lb 9 oz (115.5 kg)   02/24/16 267 lb 8 oz (121.3 kg)            Amount of exercise or physical activity: None    Problems taking medications regularly: No    Medication side effects: none  Diet: low salt and low fat/cholesterol          Problem list and histories reviewed & adjusted, as indicated.  Additional history: as documented        Reviewed and updated as needed this visit by clinical staffAllergies       Reviewed and updated as needed this visit by Provider           Past Medical History:  ---------------------------  Past Medical History:   Diagnosis Date     Atrial fibrillation (H)     paroxysmal     Basal cell carcinoma      Coronary artery disease involving native coronary artery of native heart 11/30/16    NSTEMI (peak troponin 0.05); coronary angiogram showed modeerate nonocclusive disease: LM 30-40 %, LAD 30-40%, CX  30-40 %     Diverticulosis of colon 4/16/12    sigmoid diverticulosis per CT scan; episode acute diverticulitis per CT scan     Heart valve insufficiency      Lipoma of other skin and subcutaneous tissue      Lumbar spinal stenosis 9/1/10    lumbar spinal stenosis at L5     NSTEMI (non-ST elevated myocardial infarction) (H) 11/30/16    NSTEMI (peak troponin 0.05); no acute lesions on angiogram     Obesity (BMI 30-39.9) 7/9/13    BMI 38     Obstructive sleep apnea 10/7/14    per sleep study:  AHI 25/hr, desats to 84%. Supine /hr, only 6 minutes spent supine     Other and unspecified hyperlipidemia      Prediabetes 7/9/13    elevated fasting glucoses     Tubular adenoma of colon 8/13/13    3 adenomatous poltyps removed, 4 hyperplastic polyps removed     Unspecified essential hypertension        Past Surgical History:  ---------------------------  Past Surgical History:   Procedure Laterality Date     COLONOSCOPY  8/13/13    3 adenomatous polyps and 4 hyperplastic polyps removed     HC TOOTH EXTRACTION W/FORCEP  1983    4 wisdom teeth removed withou difficulty       Current Medications:  ---------------------------  Current Outpatient Prescriptions   Medication Sig Dispense Refill     amLODIPine (NORVASC) 10 MG tablet Take 1 tablet (10 mg) by mouth daily 90 tablet 3     omeprazole (PRILOSEC) 20 MG CR capsule TAKE 1 CAPSULE(20 MG) BY MOUTH DAILY 90 capsule 2     rosuvastatin (CRESTOR) 40 MG tablet Take 1 tablet (40 mg) by mouth daily 90 tablet 3     metoprolol (TOPROL-XL) 50 MG 24 hr tablet Take 1.5 tablets (75 mg) by mouth every morning 135 tablet 2     lisinopril (PRINIVIL/ZESTRIL) 40 MG tablet Take 1 tablet (40 mg) by mouth daily 90 tablet 2     furosemide (LASIX) 20 MG tablet Take 2 tablets (40 mg) by mouth every morning 180 tablet 2     fluorouracil (EFUDEX) 5 % cream Apply topically 2 times daily Twice daily for two weeks this fall to face 40 g 0     aspirin 81 MG tablet Take 1 tablet by mouth daily.          Allergies:  -------------  Allergies   Allergen Reactions     No Known Allergies        Social History:  -------------------  Social History     Social History     Marital status:      Spouse name: N/A     Number of children: N/A     Years of education: N/A     Occupational History     Not on file.     Social History Main Topics     Smoking status: Former Smoker     Packs/day: 0.75     Years: 20.00     Types: Cigarettes     Quit date: 5/11/2006     Smokeless tobacco: Never Used      Comment: had quit for 1 year, smoked on and off for years, quit again 5/06     Alcohol use 2.4 oz/week     0 Standard drinks or equivalent, 4 Glasses of wine per week      Comment: 2 glasses of wine daily      Drug use: No     Sexual activity: No     Other Topics Concern     Special Diet No     Exercise No     Social History Narrative       Family Medical History:  ------------------------------  Family History   Problem Relation Age of Onset     Hypertension Mother      B:1920     HEART DISEASE Father      D: 70's  MI     Hypertension Brother      lipids as well     Hypertension Brother      Respiratory Brother      PRACHI     DIABETES No family hx of      CEREBROVASCULAR DISEASE No family hx of          ROS:  REVIEW OF SYSTEMS:    RESP: negative for cough, dyspnea, wheezing, hemoptysis  CV: negative for chest pain, palpitations, PND, JARAMILLO, orthopnea; reports no changes in their ability to perform physical activity (from cardiovascular standpoint)  GI: negative for dysphagia, N/V, pain, melena, diarrhea and constipation  NEURO: negative for numbness/tingling, paralysis, incoordination, or focal weakness     OBJECTIVE:                                                    /70  Pulse 73  Temp 98.6  F (37  C) (Oral)  Wt 259 lb 1.6 oz (117.5 kg)  SpO2 96%  BMI 38.26 kg/m2     GENERAL alert and no distress  EYES:  Normal sclera,conjunctiva, EOMI  HENT: oral and posterior pharynx without lesions or erythema, facies  symmetric  NECK: Neck supple. No LAD, without thyroidmegaly or JVD., Carotids without bruits.  RESP: Clear to ausculation bilaterally without wheezes or crackles. Normal BS in all fields.  CV: RRR normal S1S2 without murmurs, rubs or gallops. PMI normal  LYMPH: no cervical lymph adenopathy appreciated  MS: extremities- no gross deformities of the visible extremities noted, no edema  PSYCH: Alert and oriented times 3; speech- coherent  SKIN:  No obvious significant skin lesions on visible portions of face          ASSESSMENT/PLAN:                                                      (I21.4) NSTEMI (non-ST elevated myocardial infarction) (H)  (primary encounter diagnosis)  Comment: The patient does not report any signs or symptoms of angina or active cardiac ischemia.   They do not report any relative changes in their ability to perform physical activities over the past year.    We discussed aggressive secondary risk factor modification, including aggressive BP control (under 130/ideally), aggressive LDL lowering with statin (goal under 100 for sure/under 70 ideally), no smoking, diabetes prevention/management, no smoking, and use of either ASA or similar anti platelet agent if tolerated.     Plan: metoprolol (TOPROL-XL) 50 MG 24 hr tablet            (E66.01) Severe obesity (BMI 35.0-39.9) with comorbidity (H)  Comment: The patient's current BMI is: Body mass index is 38.26 kg/(m^2).  Obesity is a biological preventable and treatable disease, which is associated with significantly increased risk of many acute and chronic health conditions. Obesity has now been recognized as a chronic disease by the American Medical Association.  A range of serious co-morbidities are associated with obesity including increased risk for hypertension, stroke, coronary artery disease, dyslipidemia, Type II diabetes, depression, sleep apnea, cancers of the colon, breast and endometrium, obstructive sleep apnea, osteoarthritis and female  "infertility. Therefore, obesity is not just a problem; it s a disease that warrants serious evidence based treatements.  Recommended regular aerobic exercise, recommended improved diet aiming at lowering amount of processed foods, lower sugars, and lower wheat products, and moderation carbs and fat in the diet, establishing more regular meal times, always eating breakfast, front loading some of the calories and adding more protein to the diet.    Discussed the increased risks of developing or worsening all types of diabetes and other dysmetabolic syndromes.    Surgical therapy may be considered, especially in patients with BMI greater than or equal to 35 kg/m2 with multiple complications. Surgical treatments include lap-band, gastric sleeve or gastric bypass surgery.     Plan:     (I48.0) Paroxysmal atrial fibrillation (H)  Comment: only thinks he has symptoms rarely  Anticoagulation not indicated with CHADs score under 5    CHADS2 scoring:  CHF:  0  (1 point)  HTN:  1  (1 point)  AGE over 75:  0  (1 point)  Diabetes:  1  (1 point)  Stroke/TIA:  0  (2 points)  Total Score:  2  Stroke risk stratification with the CHADS2 scores: unadjusted ischemic stroke rate (% per year)*: 0 (0.6%);  1 (3.0%);  2 (4.2%); 3 (7.1%); 4 (11.1%) 5 (12.5%); 6 (13.0%)      Plan:     (R73.03) Prediabetes  Comment: Reviewed the labs showing elevated glucose levels.    Discussed \"pre-diabetes\", impaired glucose tolerance, and its part in the dysmetabolic syndrome.    Discussed the inevitable progression of impaired glucose tolerance toward worsening diabetes mellitus and the need for agressive interventions now to delay and prevent this inevitable progression.  Discussed the overall risks that dysmetabolic syndromes/impaired glucose tolerance/syndrome X pose toward increased risks of vascular disease as the main reason for agressive intervention now.  Will add medications for glucose control (i.e. metformin, glitazones, etc), lipid (e.g. " "statins), and for blood pressure (preferably ARBs and ACE) as indicated.  Will start these as early as needed based other proven ability to delay and modify these risk factors.   Discussed the need for aggressive diet control as the cornerstone of pre-diabetes and diabetes management, emphasizing the reduction of \"simple carbohydrates\" (e.g. Any kind of wheat products (e.g. any bread, any pasta), white rice, noodles, potatoes, snack foods, regular soda, juices (except fresh squeezed), cakes, cookies, candy, etc.) along with regular exercise.       Plan: Lipid panel reflex to direct LDL, Comprehensive        metabolic panel, Hemoglobin A1c, CBC with         platelets            (I10) Essential hypertension  Comment: This condition is currently controlled on the current medical regimen.  Continue current therapy.   Discussed hypertension in detail including JNC VIII guidelines for blood pressure goals.  Discussed indication for treatment and treatment options.  Discussed the importance for aggressive management of HTN to prevent vascular complications later.  Recommended lower fat, lower carbohydrate, and lower sodium (<2000 mg)diet.  Discussed required intervals for follow up on HTN, lab studies, and the need to aggresive management of other cardiac disease risk factors.  Recommened pt. follow their blood pressures outside the clinic to ensure that BPs are remaining within guidelines, and to contact me if the readings are not within guidelines so we can adjust treatment as needed.   Plan: lisinopril (PRINIVIL/ZESTRIL) 40 MG tablet,         Comprehensive metabolic panel, CBC with         platelets            (D12.6) Tubular adenoma of colon  Comment:   Plan:     (G47.33) PRACHI (obstructive sleep apnea)  Comment: This condition is currently controlled on the current medical regimen.  Continue current therapy.   Plan:     (Z23) Need for vaccination  Comment:   Plan: TDAP VACCINE (ADACEL), INJECTION INTRAMUSCULAR         " OR SUB-Q, PNEUMOCOCCAL CONJ VACCINE 13 VALENT         IM            (Z23) Need for prophylactic vaccination and inoculation against influenza  Comment:   Plan: FLU VACCINE, INCREASED ANTIGEN, PRESV FREE, AGE        65+ [73461], ADMIN INFLUENZA (For MEDICARE         Patients ONLY) []            (I25.10) Coronary artery disease involving native coronary artery of native heart without angina pectoris  Comment:   Plan: Lipid panel reflex to direct LDL, Comprehensive        metabolic panel, Hemoglobin A1c, CBC with         platelets                 See Patient Instructions    BA DAI M.D., MD  [facility]     Injectable Influenza Immunization Documentation    1.  Is the person to be vaccinated sick today?     2. Does the person to be vaccinated have an allergy to a component   of the vaccine?     3. Has the person to be vaccinated ever had a serious reaction   to influenza vaccine in the past?     4. Has the person to be vaccinated ever had Guillain-Barré syndrome?     Form completed by Trudy Rajan CMA

## 2017-09-18 NOTE — PATIENT INSTRUCTIONS
"*  Continue all medications at the same doses.  Contact your usual pharmacy if you need refills.     *  Vaccines given today:  Tetnus, annual influenza, pneumonia vaccine.     *  Return to see me in 1 year, sooner if needed.  Please get fasting labs done at the [facility] or any other [facility] lab 1-2 days before this appointment.  If you get the labs done at another [facility], make arrangements with them directly.  The orders will be in place.  Eat nothing for at least 8 hours prior to having these labs drawn.  Call [phone] to schedule appointments at [facility].       *  Follow up with Cardiology Clinic this spring when you return from [place].              5 GOALS TO PREVENT VASCULAR DISEASE:     1.  Aggressive blood pressure control, under 130/80 ideally.  Using medications if needed.    Your blood pressure is under good control    BP Readings from Last 4 Encounters:   09/18/17 126/70   03/23/17 130/70   12/16/16 136/74   12/12/16 138/74       2.  Aggressive LDL cholesterol (\"bad cholesterol\") lowering as indicated.    Your goal is an LDL under 130 for sure, preferably under 100.  (If you have diabetes or previous vascular disease, the the LDL goals would be under 100 for sure, preferably under 70.)    New guidelines identify four high-risk groups who could benefit from statins:   *people with pre-existing heart disease, such as those who have had a heart attack;   *people ages 40 to 75 who have diabetes of any type  *patients ages 40 to 75 with at least a 7.5% risk of developing cardiovascular disease over the next decade, according to a formula described in the guidelines  *patients with the sort of super-high cholesterol that sometimes runs in families, as evidenced by an LDL of 190 milligrams per deciliter or higher    Your cholesterol levels are well controlled.    Recent Labs   Lab Test  09/06/17   0806  03/23/17   0814   06/23/15   0803  06/25/14   0712   CHOL  140  177   " "< >  184  188   HDL  45  41   < >  36*  39*   LDL  53  94   < >  89  105   TRIG  208*  210*   < >  295*  218*   CHOLHDLRATIO   --    --    --   5.1*  4.8    < > = values in this interval not displayed.       3.  Aggressive diabetic prevention, screening and/or management.      You do not have diabetes as of the most recent blood tests.     4.  No smoking    5.  Consider taking low dose aspirin (81 mg) tablet once per day over the age of 50, every day unless there is a specific reason that you cannot take aspirin (such as side effect, allergy, or you are on another \"blood thinner\").        --Based on your current cardiac risk factors, you should take Aspirin 81 mg once per day if you are over 50 years of age.            --Good Grains:  Oats, brown rice, Quinoa (these do not raise the blood sugar as much)     --Bad grains:  Anything made from wheat or white rice     (because these raise the blood sugars significantly, and the possible gluten issue from wheat for some people).      --Proteins:  Aim for \"lean proteins\" including chicken, fish, seafood, pork, turkey, and eggs (in moderation); Eat red meat only occasionally          Dino Glasgow:                  *  OBESITY/WEIGHT LOSS:  ====================================================    *  Obesity is a major problem in this country.  Over 2/3 of adult Americans are overweight (BMI Over 25), and 1/3 are obese (BMI over 30)    *  Obesity is the leading cause of diabetes type II, which currently affects 1 out of 10 adult Americans and is projected to potentially affect 1 out of 3 adult Americans by 2040    *  Chronic obesity leads to \"insulin resistance\" which affects the carbohydrate (sugar) metabolism causing \"diabetisy\" which leads to type II Diabetes, increased visceral fat, a chronic low grade inflammatory state that leads to higher rates of vascular disease among other things.     *  Obesity is defined as BMI (body mass index) greater than 30.    *  Morbid " "obesity is defined as BMI over 40    Your most recent Body mass index is:  Body mass index is 38.26 kg/(m^2).    The main principles in weight loss are diet and exercise. You need to have both.      Dietary principles are aimed at keeping the fat content in your diet to less than 30% of total calories AND minimizing the simple carbohydrates (breads, sugars, pasta, etc.).  Complex carbohydrates such as wild rice, brown rice, legumes and most vegetables are OK.    *  Think \"Eat like a caveman\", eat \"primitive\" foods.    *  Keep your food shopping to the outside of the grocery store, do not eat foods that come from a bag or box, do not eat foods with bar codes.    *  Use the fork rule for all eating. [If you can't pick food up with a fork, then the food will not turn off hunger very well. Using this rule helps patients avoid choosing the wrong types of food, especially if you have had a bariatric surgery operation.   The \"wrong foods\" include liquid calories such as soup, juice, soda, slimfast, ice cream, and beer, crumbly foods such as chips, crackers, and cookies, and starch based foods such as pasta, too much rice, and too much bread.]     * Keep your calorie intake at least less than 1500 per day to start (under 1300 total if you want to be more aggressive), divided between 3 meals (try to have no meal more than 500 calories) and 2 snacks.      *  \"Learn what 500 calories looks like\" and try to keep all meals under 500 calories.      *  Drink one large glass of water BEFORE each meal.  This not only helps guard against dehydration, but it also helps provide additional \"fullness\" that will help reduce the amount of food that you will consume in a given meal by helping you fill up earlier.      *  Figure out what kind of calories you are taking in now at this time.  It is hard to change behaviors that you do not understand.      *  Eat breakfast every day.  Skipping meals has been shown to be one of the factors that " "correlates the highest with obesity, (even more than fast food).  Try to avoid the typical carbohydrates and sugars that dominate the typical American breakfast.  Try to get more protein in earlier in the day, this will make you less hungry later.       *  Try High Protein Ensure or Boost nutritional supplements (or similar versions) for breakfast if nothing else.      *  Avoid \"manufactured foods\" as much as possible.  My definition of a \"manufactured\" or \"processed\" food is any single food product that has more than 6 ingredients.     *  Keep your grocery shopping to the \"outside\" of the grocery store, this is where all the \"real food\" is located.      *  Try to limit all simple carbohydrate foods such as white breads (whole grain natural breads are OK), white rice (brown rice and wild rice are OK), pasta, noodles, \"snack foods\", candy, jam/jellies, cakes, pies, sweets, regular soda (sugar free is OK).    *  Limit the amount of citrus fruits such as oranges, dora, grapefruit.  These contain a large amount of sugars and will raise your blood sugars very high.  Try to keep less than 3 pieces of fruit per day.  Grapefruits specifically can interfere with the metabolism of \"statin\" cholesterol lowering drugs and therefore should be avoided completely if you are on a statin medication.      *  Always eat three meals a day every day:  breakfast, lunch, and supper.    *  I do not recommend over the counter diet aids such as Metabolife or Dexatrim since they have a high risk of side effects mainly fast heart rate, insomnia, and nervousness.    *  Very hard to lose weight with diet changes alone.  You need to have regular exercise.  You should aim for either 3 hours per week total of cumulative physical aerobic activity or 10,000 steps per day of activity.  Buy a pedometer and keep track of your activity.  If your typical daily activity does not add up to 10,000 total steps, then make up the difference with walking or " "some sort of aerobic activity.          Good resources for nutrition are:    ---> \"Wheat Belly\" by Celestine Croft  (a book about the many bad things processed wheat products (i.e. Bread) have caused in our country...which I believe has serious merit)       --->  \"The Paleo Diet\"  By Demetria Benítez.             --->\"In Defense of Food\"  Of \"Food Rules\" (Romero Glasgow) a book that goes into the causes obesity epidemic in our country    Dino Glasgow:                    ---> \"Picture Perfect Weight Loss\" by Santosh Mccann (another good book about choices)        ---> \"Eat This, Not That\" Series,  by Tyler Kelly  (a book about food choices in the modern world)              ---> \"The Blood Sugar Solution\" by Denny Argueta ( a book about obesity and insulin resistance leading to \"diabesity\")           Karina Kingsley ( a guidebook for counting calories, and knowing the components of the food that you eat)       \"The Best Life Diet\"  (a complete diet program)             Aim for a Healthy Weight Web Page at www.nhlbi.nih.gov       [place] Diet       Hunterkori Blake:  \"Eat More, Weigh Less\" or \"The Program for the Reversal of Heart Disease\"       [facility] web site  the food and nutrition link.       American Heart Association       American Diabetes Association (www.diabetes.org)              "

## 2017-10-22 DIAGNOSIS — R60.0 BILATERAL LOWER EXTREMITY EDEMA: ICD-10-CM

## 2017-10-24 RX ORDER — FUROSEMIDE 20 MG
TABLET ORAL
Qty: 180 TABLET | Refills: 2 | Status: SHIPPED | OUTPATIENT
Start: 2017-10-24 | End: 2018-08-25

## 2017-12-04 DIAGNOSIS — I21.4 NSTEMI (NON-ST ELEVATED MYOCARDIAL INFARCTION) (H): ICD-10-CM

## 2017-12-05 RX ORDER — METOPROLOL SUCCINATE 50 MG/1
TABLET, EXTENDED RELEASE ORAL
Qty: 135 TABLET | Refills: 0 | OUTPATIENT
Start: 2017-12-05

## 2017-12-07 DIAGNOSIS — I21.4 NSTEMI (NON-ST ELEVATED MYOCARDIAL INFARCTION) (H): ICD-10-CM

## 2017-12-07 RX ORDER — METOPROLOL SUCCINATE 50 MG/1
TABLET, EXTENDED RELEASE ORAL
Qty: 135 TABLET | Refills: 2 | Status: SHIPPED | OUTPATIENT
Start: 2017-12-07 | End: 2018-06-28

## 2017-12-31 DIAGNOSIS — K21.9 GASTROESOPHAGEAL REFLUX DISEASE WITHOUT ESOPHAGITIS: ICD-10-CM

## 2018-01-06 DIAGNOSIS — K21.9 GASTROESOPHAGEAL REFLUX DISEASE WITHOUT ESOPHAGITIS: ICD-10-CM

## 2018-01-08 NOTE — TELEPHONE ENCOUNTER
Requested Prescriptions   Pending Prescriptions Disp Refills     omeprazole (PRILOSEC) 20 MG CR capsule [Pharmacy Med Name: OMEPRAZOLE 20MG CAPSULES]          Last Written Prescription Date:  01/02/18  Last Fill Quantity: 90,  # refills: 2   Last Office Visit with [facility], P or [facility] prescribing provider:  09/18/17   Future Office Visit:   0 90 capsule 0     Sig: TAKE 1 CAPSULE(20 MG) BY MOUTH DAILY    PPI Protocol Passed    1/6/2018  4:23 PM       Passed - Not on Clopidogrel (unless Pantoprazole ordered)       Passed - No diagnosis of osteoporosis on record       Passed - Recent or future visit with authorizing provider's specialty    Patient had office visit in the last year or has a visit in the next 30 days with authorizing provider.  See chart review.              Passed - Patient is age 18 or older

## 2018-03-30 DIAGNOSIS — E78.5 HYPERLIPIDEMIA LDL GOAL <70: ICD-10-CM

## 2018-03-30 RX ORDER — ROSUVASTATIN CALCIUM 40 MG/1
40 TABLET, COATED ORAL DAILY
Qty: 90 TABLET | Refills: 3 | Status: SHIPPED | OUTPATIENT
Start: 2018-03-30 | End: 2018-09-21

## 2018-05-24 ENCOUNTER — TELEPHONE (OUTPATIENT)
Dept: CARDIOLOGY | Facility: CLINIC | Age: 68
End: 2018-05-24

## 2018-05-24 NOTE — TELEPHONE ENCOUNTER
Pt scheduled for OV with  March tues May 29th.  Pt not scheduled for annual echo.  Called pt - he can see  March next Tuesday and then do echo or we can reschedule.  Will call him back per his request.

## 2018-05-24 NOTE — TELEPHONE ENCOUNTER
Message from lab requesting we  Enter lab order for FLP/ALT per  March. Reviewed chart, patient had both labs done on 9/6/17 with PCP OV. No med changes were made and LDL was at goal < 70 mg/dL. Left patient VM stating we can cancel labs on 5/30 at [facility] but that I did not cancel the lab appt until he calls back to confirm this plan. Team 1 phone number left.

## 2018-05-30 ENCOUNTER — HOSPITAL ENCOUNTER (OUTPATIENT)
Dept: CARDIOLOGY | Facility: CLINIC | Age: 68
Discharge: HOME | End: 2018-05-30
Attending: INTERNAL MEDICINE | Admitting: INTERNAL MEDICINE
Payer: MEDICARE

## 2018-05-30 ENCOUNTER — TELEPHONE (OUTPATIENT)
Dept: CARDIOLOGY | Facility: CLINIC | Age: 68
End: 2018-05-30

## 2018-05-30 DIAGNOSIS — I35.0 NONRHEUMATIC AORTIC VALVE STENOSIS: ICD-10-CM

## 2018-05-30 PROCEDURE — 93306 TTE W/DOPPLER COMPLETE: CPT | Mod: 26 | Performed by: INTERNAL MEDICINE

## 2018-05-30 PROCEDURE — 25500064 ZZH RX 255 OP 636: Performed by: INTERNAL MEDICINE

## 2018-05-30 PROCEDURE — 93306 TTE W/DOPPLER COMPLETE: CPT

## 2018-05-30 RX ADMIN — HUMAN ALBUMIN MICROSPHERES AND PERFLUTREN 3 ML: 10; .22 INJECTION, SOLUTION INTRAVENOUS at 08:44

## 2018-05-30 NOTE — TELEPHONE ENCOUNTER
Echo results noted, please see interpretation below. Pt is scheduled for annual follow up visit with Dr. Du on 6/28/18. Will route to Dr. Du for any recommendations prior to appointment.     Interpretation Summary     Mild to moderate valvular aortic stenosis.  The ascending aorta is Mildly dilated.  Left ventricular systolic function is normal.  No regional wall motion abnormalities noted.  The study was technically difficult.

## 2018-06-21 NOTE — TELEPHONE ENCOUNTER
Called to pt to communicate results, no answer. LVM for pt to call back to Team 1 to review results.

## 2018-06-26 PROBLEM — I35.0 NONRHEUMATIC AORTIC VALVE STENOSIS: Status: ACTIVE | Noted: 2018-06-26

## 2018-06-26 PROBLEM — I77.810 ASCENDING AORTA DILATATION (H): Status: ACTIVE | Noted: 2018-06-26

## 2018-06-28 ENCOUNTER — OFFICE VISIT (OUTPATIENT)
Dept: CARDIOLOGY | Facility: CLINIC | Age: 68
End: 2018-06-28
Attending: INTERNAL MEDICINE
Payer: COMMERCIAL

## 2018-06-28 VITALS
WEIGHT: 255 LBS | HEIGHT: 69 IN | DIASTOLIC BLOOD PRESSURE: 75 MMHG | BODY MASS INDEX: 37.77 KG/M2 | SYSTOLIC BLOOD PRESSURE: 125 MMHG | HEART RATE: 69 BPM

## 2018-06-28 DIAGNOSIS — R07.9 CHEST PAIN, UNSPECIFIED TYPE: Primary | ICD-10-CM

## 2018-06-28 DIAGNOSIS — I35.0 NONRHEUMATIC AORTIC VALVE STENOSIS: ICD-10-CM

## 2018-06-28 DIAGNOSIS — E78.5 HYPERLIPIDEMIA LDL GOAL <70: ICD-10-CM

## 2018-06-28 PROCEDURE — 99214 OFFICE O/P EST MOD 30 MIN: CPT | Performed by: INTERNAL MEDICINE

## 2018-06-28 NOTE — PROGRESS NOTES
Service Date: 06/28/2018      HISTORY OF PRESENT ILLNESS:  Mr. Solis is a very pleasant, 68-year-old gentleman who is known to me.  I met him in 11/2016 when he presented with an NSTEMI.  We found moderate aortic valve stenosis with a mean gradient of 18 at that time, and he had a cardiac catheterization that showed moderate disease, including in his left main he had a 30% disease, but small vessels even distally.  He is managed medically and doing reasonably well when I last saw him 03/23/2017.      Since I last saw him, he has had a lot of deaths in the family, and he has been a bit stressed from that.  His weight, fortunately, has been stable.  His BMI is 37.  He knows he needs to work on diet and exercise.  He eats a lot of potato chips and hotdogs, and his wife is also aware that he needs to eat more healthily, but he has a hard time doing so.  He reports in the past 2 weeks he has been having symptoms of chest heaviness, and this is new.  It occurs with exertion; for instance, if he goes up a couple flights of stairs.  He is not doing any walking or activity.  He reports that walking just [facility] a long walk for him.  His cholesterol when last checked in September of last year was under good control with an LDL of 53 and HDL of 45.  His blood pressure initially was a bit elevated here, but on recheck, it is very well controlled, and he reports he checks it at home, and it is also well controlled.  He does have a history of atrial fibrillation without any palpitations or racing heart.  He did have a history of GI bleed on Xarelto, so this was stopped.  He is currently not on any medicines from that standpoint.  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      IMPRESSION AND PLAN:   1.  Moderate coronary artery disease, at present managed medically with last cath 12/2016.     2.  Hypertension, controlled.   3.  History of GI bleed, not on anticoagulation.    4.  Atrial fibrillation, paroxysmal.   5.  Hyperlipidemia, well controlled.   6.  Obesity.   7.  Moderate aortic valve stenosis with a mean gradient of 20.   8.  Ascending aortic dilatation between 3.8-4.1 cm depending on the test.      DISCUSSION:  It was a pleasure to see Mr. Solis in followup.  Clinically, he has concerning symptoms for increased chest heaviness.  We discussed options, and he would like a less invasive option to start, so I have ordered an exercise stress echo.  I have asked him to hold the beta blocker on the day of this test.  Otherwise, I will keep his medications as prescribed.  We discussed the importance of following up every year with an echo to assess his valve and what to expect from that standpoint.  Notably, he does have a history in the family of needing to have that valve replaced, it sounds like, including his brother just this past year.  It has been unclear whether it is bicuspid because of the calcification.  I will have him follow with an NP.  If he continues to have symptoms, then we may have to proceed with a cath regardless.  He will consider doing Weight Watchers as well.        It was a pleasure to see him.  Please do not hesitate to contact me with any questions or concerns.         SADI THIBODEAUX MD             D: 2018   T: 2018   MT: ginette      Name:     TISHA SOLIS   MRN:      [id number]        Account:      [id number]   :      1950           Service Date: 2018      Document: [id number]

## 2018-06-28 NOTE — LETTER
6/28/2018    Sinan Stephens MD  [address] [facility] [address]    RE: Giacomo Solis       Dear Colleague,    I had the pleasure of seeing Giacomo Solis in the [facility].    HPI:     Please see dictated note    PAST MEDICAL HISTORY:  Past Medical History:   Diagnosis Date     Atrial fibrillation (H)     paroxysmal     Basal cell carcinoma      Coronary artery disease involving native coronary artery of native heart 11/30/16    NSTEMI (peak troponin 0.05); coronary angiogram showed modeerate nonocclusive disease: LM 30-40 %, LAD 30-40%, CX 30-40 %     Diverticulosis of colon 4/16/12    sigmoid diverticulosis per CT scan; episode acute diverticulitis per CT scan     Heart valve insufficiency      Lipoma of other skin and subcutaneous tissue      Lumbar spinal stenosis 9/1/10    lumbar spinal stenosis at L5     NSTEMI (non-ST elevated myocardial infarction) (H) 11/30/16    NSTEMI (peak troponin 0.05); no acute lesions on angiogram     Obesity (BMI 30-39.9) 7/9/13    BMI 38     Obstructive sleep apnea 10/7/14    per sleep study:  AHI 25/hr, desats to 84%. Supine /hr, only 6 minutes spent supine     Other and unspecified hyperlipidemia      Prediabetes 7/9/13    elevated fasting glucoses     Tubular adenoma of colon 8/13/13    3 adenomatous poltyps removed, 4 hyperplastic polyps removed     Unspecified essential hypertension        CURRENT MEDICATIONS:  Current Outpatient Prescriptions   Medication Sig Dispense Refill     amLODIPine (NORVASC) 10 MG tablet Take 1 tablet (10 mg) by mouth daily 90 tablet 3     aspirin 81 MG tablet Take 1 tablet by mouth daily.       furosemide (LASIX) 20 MG tablet TAKE 2 TABLETS(40 MG) BY MOUTH EVERY MORNING 180 tablet 2     lisinopril (PRINIVIL/ZESTRIL) 40 MG tablet Take 1 tablet (40 mg) by mouth daily 90 tablet 3     metoprolol (TOPROL-XL) 50 MG 24 hr tablet Take 1.5 tablets (75 mg) by mouth every morning 135 tablet 3      omeprazole (PRILOSEC) 20 MG CR capsule TAKE 1 CAPSULE(20 MG) BY MOUTH DAILY 90 capsule 2     rosuvastatin (CRESTOR) 40 MG tablet Take 1 tablet (40 mg) by mouth daily 90 tablet 3     fluorouracil (EFUDEX) 5 % cream Apply topically 2 times daily Twice daily for two weeks this fall to face 40 g 0     [DISCONTINUED] metoprolol (TOPROL-XL) 50 MG 24 hr tablet TAKE 1 AND 1/2 TABLETS(75 MG) BY MOUTH EVERY MORNING 135 tablet 2       PAST SURGICAL HISTORY:  Past Surgical History:   Procedure Laterality Date     COLONOSCOPY  8/13/13    3 adenomatous polyps and 4 hyperplastic polyps removed     HC TOOTH EXTRACTION W/FORCEP  1983    4 wisdom teeth removed withou difficulty       ALLERGIES     Allergies   Allergen Reactions     No Known Allergies        FAMILY HISTORY:  Family History   Problem Relation Age of Onset     Hypertension Mother      B:1920     HEART DISEASE Father      D: 70's  MI     Hypertension Brother      lipids as well     Hypertension Brother      Respiratory Brother      PRACHI     Diabetes No family hx of      Cerebrovascular Disease No family hx of        SOCIAL HISTORY:  Social History     Social History     Marital status:      Spouse name: N/A     Number of children: N/A     Years of education: N/A     Social History Main Topics     Smoking status: Former Smoker     Packs/day: 0.75     Years: 20.00     Types: Cigarettes     Quit date: 5/11/2006     Smokeless tobacco: Never Used      Comment: had quit for 1 year, smoked on and off for years, quit again 5/06     Alcohol use 2.4 oz/week     0 Standard drinks or equivalent, 4 Glasses of wine per week      Comment: 2 glasses of wine daily      Drug use: No     Sexual activity: No     Other Topics Concern     Special Diet No     Exercise No     Social History Narrative       ROS:   Constitutional: No fever, chills, or sweats. No weight gain/loss   ENT: No visual disturbance, ear ache, epistaxis, sore throat  Allergies/Immunologic: Negative.   Respiratory:  "No cough, hemoptysia  Cardiovascular: As per HPI  GI: No nausea, vomiting, hematemesis, melena, or hematochezia  : No urinary frequency, dysuria, or hematuria  Integument: Negative  Psychiatric: Negative  Neuro: Negative  Endocrinology: Negative   Musculoskeletal: Negative    EXAM:  /75  Pulse 69  Ht 1.753 m (5' 9.02\")  Wt 115.7 kg (255 lb)  BMI 37.64 kg/m2  In general, the patient is a pleasant male in no apparent distress.    HEENT: NC/AT.  PERRLA.  EOMI.  Sclerae white, not injected.  Nares clear.  Pharynx without erythema or exudate.  Dentition intact.    Neck: . Carotids +4/4 bilaterally without bruits.  No jugular venous distension.   Heart: RRR. Normal S1, S2 splits physiologically. No murmur, rub, click, or gallop.     Lungs: CTA.  No ronchi, wheezes, rales.   Abdomen: Soft, nontender, nondistended.   Extremities: No clubbing, cyanosis, or edema.  Neurologic: Alert and oriented to person/place/time, normal speech, gait and affect  Skin: No petechiae, purpura or rash.    Labs:  LIPID RESULTS:  Lab Results   Component Value Date    CHOL 140 09/06/2017    HDL 45 09/06/2017    LDL 53 09/06/2017    TRIG 208 (H) 09/06/2017    CHOLHDLRATIO 5.1 (H) 06/23/2015    NHDL 95 09/06/2017       LIVER ENZYME RESULTS:  Lab Results   Component Value Date    AST 19 09/06/2017    ALT 32 09/06/2017       CBC RESULTS:  Lab Results   Component Value Date    WBC 6.4 09/06/2017    RBC 4.91 09/06/2017    HGB 14.1 09/06/2017    HCT 43.3 09/06/2017    MCV 88 09/06/2017    MCH 28.7 09/06/2017    MCHC 32.6 09/06/2017    RDW 13.9 09/06/2017     09/06/2017       BMP RESULTS:  Lab Results   Component Value Date     09/06/2017    POTASSIUM 3.5 09/06/2017    CHLORIDE 105 09/06/2017    CO2 27 09/06/2017    ANIONGAP 8 09/06/2017     (H) 09/06/2017    BUN 16 09/06/2017    CR 0.75 09/06/2017    GFRESTIMATED >90 09/06/2017    GFRESTBLACK >90 09/06/2017    LASHAY 8.8 09/06/2017        A1C RESULTS:  Lab Results "   Component Value Date    A1C 6.1 (H) 09/06/2017       INR RESULTS:  Lab Results   Component Value Date    INR 0.96 11/05/2013       ESTER Du MD     CC  Patient Care Team:  Sinan Stephens MD as PCP - General  MARCHSADI LENNY    Service Date: 06/28/2018      HISTORY OF PRESENT ILLNESS:  Mr. Solis is a very pleasant, 68-year-old gentleman who is known to me.  I met him in 11/2016 when he presented with an NSTEMI.  We found moderate aortic valve stenosis with a mean gradient of 18 at that time, and he had a cardiac catheterization that showed moderate disease, including in his left main he had a 30% disease, but small vessels even distally.  He is managed medically and doing reasonably well when I last saw him 03/23/2017.      Since I last saw him, he has had a lot of deaths in the family, and he has been a bit stressed from that.  His weight, fortunately, has been stable.  His BMI is 37.  He knows he needs to work on diet and exercise.  He eats a lot of potato chips and hotdogs, and his wife is also aware that he needs to eat more healthily, but he has a hard time doing so.  He reports in the past 2 weeks he has been having symptoms of chest heaviness, and this is new.  It occurs with exertion; for instance, if he goes up a couple flights of stairs.  He is not doing any walking or activity.  He reports that walking just [facility] was a long walk for him.  His cholesterol when last checked in September of last year was under good control with an LDL of 53 and HDL of 45.  His blood pressure initially was a bit elevated here, but on recheck, it is very well controlled, and he reports he checks it at home, and it is also well controlled.  He does have a history of atrial fibrillation without any palpitations or racing heart.  He did have a history of GI bleed on Xarelto, so this was stopped.  He is currently not on any medicines from that standpoint.  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      IMPRESSION AND PLAN:   1.  Moderate coronary artery disease, at present managed medically with last cath 2016.     2.  Hypertension, controlled.   3.  History of GI bleed, not on anticoagulation.   4.  Atrial fibrillation, paroxysmal.   5.  Hyperlipidemia, well controlled.   6.  Obesity.   7.  Moderate aortic valve stenosis with a mean gradient of 20.   8.  Ascending aortic dilatation between 3.8-4.1 cm depending on the test.      DISCUSSION:  It was a pleasure to see Mr. Hernandez in followup.  Clinically, he has concerning symptoms for increased chest heaviness.  We discussed options, and he would like a less invasive option to start, so I have ordered an exercise stress echo.  I have asked him to hold the beta blocker on the day of this test.  Otherwise, I will keep his medications as prescribed.  We discussed the importance of following up every year with an echo to assess his valve and what to expect from that standpoint.  Notably, he does have a history in the family of needing to have that valve replaced, it sounds like, including his brother just this past year.  It has been unclear whether it is bicuspid because of the calcification.  I will have him follow with an NP.  If he continues to have symptoms, then we may have to proceed with a cath regardless.  He will consider doing Weight Watchers as well.        It was a pleasure to see him.  Please do not hesitate to contact me with any questions or concerns.         SADI DU MD             D: 2018   T: 2018   MT: ginette      Name:     TISHA HERNANDEZ   MRN:      [id number]        Account:      [id number]   :      1950           Service Date: 2018      Document: [id number]       Thank you for allowing me to participate in the care of your patient.      Sincerely,     Sadi Du MD     [facility]    cc:   Sadi Du  MD  [address]  [place] [id number]

## 2018-06-28 NOTE — LETTER
6/28/2018      Sinan Stephens MD  [address] [facility] [address]      RE: Giacomo Solis       Dear Colleague,    I had the pleasure of seeing Giacomo Solis in the [facility].    Service Date: 06/28/2018      HISTORY OF PRESENT ILLNESS:  Mr. Solis is a very pleasant, 68-year-old gentleman who is known to me.  I met him in 11/2016 when he presented with an NSTEMI.  We found moderate aortic valve stenosis with a mean gradient of 18 at that time, and he had a cardiac catheterization that showed moderate disease, including in his left main he had a 30% disease, but small vessels even distally.  He is managed medically and doing reasonably well when I last saw him 03/23/2017.      Since I last saw him, he has had a lot of deaths in the family, and he has been a bit stressed from that.  His weight, fortunately, has been stable.  His BMI is 37.  He knows he needs to work on diet and exercise.  He eats a lot of potato chips and hotdogs, and his wife is also aware that he needs to eat more healthily, but he has a hard time doing so.  He reports in the past 2 weeks he has been having symptoms of chest heaviness, and this is new.  It occurs with exertion; for instance, if he goes up a couple flights of stairs.  He is not doing any walking or activity.  He reports that walking just from the garage [facility] was a long walk for him.  His cholesterol when last checked in September of last year was under good control with an LDL of 53 and HDL of 45.  His blood pressure initially was a bit elevated here, but on recheck, it is very well controlled, and he reports he checks it at home, and it is also well controlled.  He does have a history of atrial fibrillation without any palpitations or racing heart.  He did have a history of GI bleed on Xarelto, so this was stopped.  He is currently not on any medicines from that standpoint.  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      IMPRESSION AND PLAN:   1.  Moderate coronary artery disease, at present managed medically with last cath 2016.     2.  Hypertension, controlled.   3.  History of GI bleed, not on anticoagulation.   4.  Atrial fibrillation, paroxysmal.   5.  Hyperlipidemia, well controlled.   6.  Obesity.   7.  Moderate aortic valve stenosis with a mean gradient of 20.   8.  Ascending aortic dilatation between 3.8-4.1 cm depending on the test.      DISCUSSION:  It was a pleasure to see Mr. Hernandez in followup.  Clinically, he has concerning symptoms for increased chest heaviness.  We discussed options, and he would like a less invasive option to start, so I have ordered an exercise stress echo.  I have asked him to hold the beta blocker on the day of this test.  Otherwise, I will keep his medications as prescribed.  We discussed the importance of following up every year with an echo to assess his valve and what to expect from that standpoint.  Notably, he does have a history in the family of needing to have that valve replaced, it sounds like, including his brother just this past year.  It has been unclear whether it is bicuspid because of the calcification.  I will have him follow with an NP.  If he continues to have symptoms, then we may have to proceed with a cath regardless.  He will consider doing Weight Watchers as well.        It was a pleasure to see him.  Please do not hesitate to contact me with any questions or concerns.         SADI THIBODEAUX MD             D: 2018   T: 2018   MT: ginette      Name:     TISHA HERANNDEZ   MRN:      [id number]        Account:      [id number]   :      1950           Service Date: 2018      Document: [id number]         Outpatient Encounter Prescriptions as of 2018   Medication Sig Dispense Refill     amLODIPine (NORVASC) 10 MG tablet Take 1 tablet (10 mg) by mouth daily 90 tablet 3     aspirin 81 MG tablet Take 1 tablet by  mouth daily.       furosemide (LASIX) 20 MG tablet TAKE 2 TABLETS(40 MG) BY MOUTH EVERY MORNING 180 tablet 2     lisinopril (PRINIVIL/ZESTRIL) 40 MG tablet Take 1 tablet (40 mg) by mouth daily 90 tablet 3     metoprolol (TOPROL-XL) 50 MG 24 hr tablet Take 1.5 tablets (75 mg) by mouth every morning 135 tablet 3     omeprazole (PRILOSEC) 20 MG CR capsule TAKE 1 CAPSULE(20 MG) BY MOUTH DAILY 90 capsule 2     rosuvastatin (CRESTOR) 40 MG tablet Take 1 tablet (40 mg) by mouth daily 90 tablet 3     fluorouracil (EFUDEX) 5 % cream Apply topically 2 times daily Twice daily for two weeks this fall to face 40 g 0     [DISCONTINUED] metoprolol (TOPROL-XL) 50 MG 24 hr tablet TAKE 1 AND 1/2 TABLETS(75 MG) BY MOUTH EVERY MORNING 135 tablet 2     [DISCONTINUED] omeprazole (PRILOSEC) 20 MG CR capsule TAKE 1 CAPSULE(20 MG) BY MOUTH DAILY 90 capsule 2     No facility-administered encounter medications on file as of 6/28/2018.        Again, thank you for allowing me to participate in the care of your patient.      Sincerely,    Duc Du MD     [facility]

## 2018-06-28 NOTE — PROGRESS NOTES
HPI:     Please see dictated note    PAST MEDICAL HISTORY:  Past Medical History:   Diagnosis Date     Atrial fibrillation (H)     paroxysmal     Basal cell carcinoma      Coronary artery disease involving native coronary artery of native heart 11/30/16    NSTEMI (peak troponin 0.05); coronary angiogram showed modeerate nonocclusive disease: LM 30-40 %, LAD 30-40%, CX 30-40 %     Diverticulosis of colon 4/16/12    sigmoid diverticulosis per CT scan; episode acute diverticulitis per CT scan     Heart valve insufficiency      Lipoma of other skin and subcutaneous tissue      Lumbar spinal stenosis 9/1/10    lumbar spinal stenosis at L5     NSTEMI (non-ST elevated myocardial infarction) (H) 11/30/16    NSTEMI (peak troponin 0.05); no acute lesions on angiogram     Obesity (BMI 30-39.9) 7/9/13    BMI 38     Obstructive sleep apnea 10/7/14    per sleep study:  AHI 25/hr, desats to 84%. Supine /hr, only 6 minutes spent supine     Other and unspecified hyperlipidemia      Prediabetes 7/9/13    elevated fasting glucoses     Tubular adenoma of colon 8/13/13    3 adenomatous poltyps removed, 4 hyperplastic polyps removed     Unspecified essential hypertension        CURRENT MEDICATIONS:  Current Outpatient Prescriptions   Medication Sig Dispense Refill     amLODIPine (NORVASC) 10 MG tablet Take 1 tablet (10 mg) by mouth daily 90 tablet 3     aspirin 81 MG tablet Take 1 tablet by mouth daily.       furosemide (LASIX) 20 MG tablet TAKE 2 TABLETS(40 MG) BY MOUTH EVERY MORNING 180 tablet 2     lisinopril (PRINIVIL/ZESTRIL) 40 MG tablet Take 1 tablet (40 mg) by mouth daily 90 tablet 3     metoprolol (TOPROL-XL) 50 MG 24 hr tablet Take 1.5 tablets (75 mg) by mouth every morning 135 tablet 3     omeprazole (PRILOSEC) 20 MG CR capsule TAKE 1 CAPSULE(20 MG) BY MOUTH DAILY 90 capsule 2     rosuvastatin (CRESTOR) 40 MG tablet Take 1 tablet (40 mg) by mouth daily 90 tablet 3     fluorouracil (EFUDEX) 5 % cream Apply topically 2  times daily Twice daily for two weeks this fall to face 40 g 0     [DISCONTINUED] metoprolol (TOPROL-XL) 50 MG 24 hr tablet TAKE 1 AND 1/2 TABLETS(75 MG) BY MOUTH EVERY MORNING 135 tablet 2       PAST SURGICAL HISTORY:  Past Surgical History:   Procedure Laterality Date     COLONOSCOPY  8/13/13    3 adenomatous polyps and 4 hyperplastic polyps removed     HC TOOTH EXTRACTION W/FORCEP  1983    4 wisdom teeth removed withou difficulty       ALLERGIES     Allergies   Allergen Reactions     No Known Allergies        FAMILY HISTORY:  Family History   Problem Relation Age of Onset     Hypertension Mother      B:1920     HEART DISEASE Father      D: 70's  MI     Hypertension Brother      lipids as well     Hypertension Brother      Respiratory Brother      PRACHI     Diabetes No family hx of      Cerebrovascular Disease No family hx of        SOCIAL HISTORY:  Social History     Social History     Marital status:      Spouse name: N/A     Number of children: N/A     Years of education: N/A     Social History Main Topics     Smoking status: Former Smoker     Packs/day: 0.75     Years: 20.00     Types: Cigarettes     Quit date: 5/11/2006     Smokeless tobacco: Never Used      Comment: had quit for 1 year, smoked on and off for years, quit again 5/06     Alcohol use 2.4 oz/week     0 Standard drinks or equivalent, 4 Glasses of wine per week      Comment: 2 glasses of wine daily      Drug use: No     Sexual activity: No     Other Topics Concern     Special Diet No     Exercise No     Social History Narrative       ROS:   Constitutional: No fever, chills, or sweats. No weight gain/loss   ENT: No visual disturbance, ear ache, epistaxis, sore throat  Allergies/Immunologic: Negative.   Respiratory: No cough, hemoptysia  Cardiovascular: As per HPI  GI: No nausea, vomiting, hematemesis, melena, or hematochezia  : No urinary frequency, dysuria, or hematuria  Integument: Negative  Psychiatric: Negative  Neuro:  "Negative  Endocrinology: Negative   Musculoskeletal: Negative    EXAM:  /75  Pulse 69  Ht 1.753 m (5' 9.02\")  Wt 115.7 kg (255 lb)  BMI 37.64 kg/m2  In general, the patient is a pleasant male in no apparent distress.    HEENT: NC/AT.  PERRLA.  EOMI.  Sclerae white, not injected.  Nares clear.  Pharynx without erythema or exudate.  Dentition intact.    Neck: . Carotids +4/4 bilaterally without bruits.  No jugular venous distension.   Heart: RRR. Normal S1, S2 splits physiologically. No murmur, rub, click, or gallop.     Lungs: CTA.  No ronchi, wheezes, rales.   Abdomen: Soft, nontender, nondistended.   Extremities: No clubbing, cyanosis, or edema.  Neurologic: Alert and oriented to person/place/time, normal speech, gait and affect  Skin: No petechiae, purpura or rash.    Labs:  LIPID RESULTS:  Lab Results   Component Value Date    CHOL 140 09/06/2017    HDL 45 09/06/2017    LDL 53 09/06/2017    TRIG 208 (H) 09/06/2017    CHOLHDLRATIO 5.1 (H) 06/23/2015    NHDL 95 09/06/2017       LIVER ENZYME RESULTS:  Lab Results   Component Value Date    AST 19 09/06/2017    ALT 32 09/06/2017       CBC RESULTS:  Lab Results   Component Value Date    WBC 6.4 09/06/2017    RBC 4.91 09/06/2017    HGB 14.1 09/06/2017    HCT 43.3 09/06/2017    MCV 88 09/06/2017    MCH 28.7 09/06/2017    MCHC 32.6 09/06/2017    RDW 13.9 09/06/2017     09/06/2017       BMP RESULTS:  Lab Results   Component Value Date     09/06/2017    POTASSIUM 3.5 09/06/2017    CHLORIDE 105 09/06/2017    CO2 27 09/06/2017    ANIONGAP 8 09/06/2017     (H) 09/06/2017    BUN 16 09/06/2017    CR 0.75 09/06/2017    GFRESTIMATED >90 09/06/2017    GFRESTBLACK >90 09/06/2017    LASHAY 8.8 09/06/2017        A1C RESULTS:  Lab Results   Component Value Date    A1C 6.1 (H) 09/06/2017       INR RESULTS:  Lab Results   Component Value Date    INR 0.96 11/05/2013       ESTER Du MD     CC  Patient Care Team:  Sinan Stephens MD as PCP - " General  MARCH, SADI GALVEZ

## 2018-06-28 NOTE — MR AVS SNAPSHOT
After Visit Summary   6/28/2018    Giacomo LANIER Closter    MRN: [id number]           Patient Information     Date Of Birth          1950        Visit Information        Provider Department      6/28/2018 8:45 AM Duc Du MD [facility]   Steve        Today's Diagnoses     Chest pain, unspecified type    -  1    Hyperlipidemia LDL goal <70           Follow-ups after your visit        Additional Services     Follow-Up with Cardiac Advanced Practice Provider                 Future tests that were ordered for you today     Open Future Orders        Priority Expected Expires Ordered    Exercise Stress Echocardiogram Routine 7/5/2018 6/28/2019 6/28/2018    Follow-Up with Cardiac Advanced Practice Provider Routine 7/5/2018 6/28/2019 6/28/2018            Who to contact     If you have questions or need follow up information about today's clinic visit or your schedule please contact [facility]   STEVE directly at [phone].  Normal or non-critical lab and imaging results will be communicated to you by [employer], letter or phone within 4 business days after the clinic has received the results. If you do not hear from us within 7 days, please contact the clinic through [employer] or phone. If you have a critical or abnormal lab result, we will notify you by phone as soon as possible.  Submit refill requests through [employer] or call your pharmacy and they will forward the refill request to us. Please allow 3 business days for your refill to be completed.          Additional Information About Your Visit        MyChart Information     [employer] gives you secure access to your electronic health record. If you see a primary care provider, you can also send messages to your care team and make appointments. If you have questions, please call your primary care clinic.  If you do not have a primary care provider, please call [phone] and they will  "assist you.        Care EveryWhere ID     This is your Care EveryWhere ID. This could be used by other organizations to access your [facility] medical records  [id number]        Your Vitals Were     Pulse Height BMI (Body Mass Index)             69 1.753 m (5' 9.02\") 37.64 kg/m2          Blood Pressure from Last 3 Encounters:   06/28/18 125/75   09/18/17 126/70   03/23/17 130/70    Weight from Last 3 Encounters:   06/28/18 115.7 kg (255 lb)   09/18/17 117.5 kg (259 lb 1.6 oz)   03/23/17 118.4 kg (261 lb 1.6 oz)              We Performed the Following     Follow-Up with Cardiologist        Primary Care Provider Office Phone # Fax #    Sinan Stephens [phone]       [address] [facility] [address]        Equal Access to Services     [facility] Center[facility] : Hadii aad ku hadasho Soomaali, waaxda luqadaha, qaybta kaalmada adeegyada, waxay isaiin haymarck ken . So [facility] [phone].    ATENCIÓN: Si habla español, tiene a galindo disposición servicios gratuitos de asistencia lingüística. Gary al [phone].    We comply with applicable federal civil rights laws and [place] laws. We do not discriminate on the basis of race, color, national origin, age, disability, sex, sexual orientation, or gender identity.            Thank you!     Thank you for choosing [facility]  for your care. Our goal is always to provide you with excellent care. Hearing back from our patients is one way we can continue to improve our services. Please take a few minutes to complete the written survey that you may receive in the mail after your visit with us. Thank you!             Your Updated Medication List - Protect others around you: Learn how to safely use, store and throw away your medicines at www.disposemymeds.org.          This list is accurate as of 6/28/18  9:18 AM.  Always use your most recent med list.                   Brand Name Dispense Instructions for use " Diagnosis    amLODIPine 10 MG tablet    NORVASC    90 tablet    Take 1 tablet (10 mg) by mouth daily    Essential hypertension       aspirin 81 MG tablet      Take 1 tablet by mouth daily.    Unspecified essential hypertension       fluorouracil 5 % cream    EFUDEX    40 g    Apply topically 2 times daily Twice daily for two weeks this fall to face    AK (actinic keratosis)       furosemide 20 MG tablet    LASIX    180 tablet    TAKE 2 TABLETS(40 MG) BY MOUTH EVERY MORNING    Bilateral lower extremity edema       lisinopril 40 MG tablet    PRINIVIL/ZESTRIL    90 tablet    Take 1 tablet (40 mg) by mouth daily    Essential hypertension       metoprolol succinate 50 MG 24 hr tablet    TOPROL-XL    135 tablet    Take 1.5 tablets (75 mg) by mouth every morning    NSTEMI (non-ST elevated myocardial infarction) (H)       omeprazole 20 MG CR capsule    priLOSEC    90 capsule    TAKE 1 CAPSULE(20 MG) BY MOUTH DAILY    Gastroesophageal reflux disease without esophagitis       rosuvastatin 40 MG tablet    CRESTOR    90 tablet    Take 1 tablet (40 mg) by mouth daily    Hyperlipidemia LDL goal <70

## 2018-07-02 ENCOUNTER — HOSPITAL ENCOUNTER (OUTPATIENT)
Dept: CARDIOLOGY | Facility: CLINIC | Age: 68
Discharge: HOME | End: 2018-07-02
Attending: INTERNAL MEDICINE | Admitting: INTERNAL MEDICINE
Payer: MEDICARE

## 2018-07-02 DIAGNOSIS — R07.9 CHEST PAIN, UNSPECIFIED TYPE: ICD-10-CM

## 2018-07-02 DIAGNOSIS — E78.5 HYPERLIPIDEMIA LDL GOAL <70: ICD-10-CM

## 2018-07-02 PROCEDURE — 25500064 ZZH RX 255 OP 636: Performed by: INTERNAL MEDICINE

## 2018-07-02 PROCEDURE — 93016 CV STRESS TEST SUPVJ ONLY: CPT | Performed by: INTERNAL MEDICINE

## 2018-07-02 PROCEDURE — 93321 DOPPLER ECHO F-UP/LMTD STD: CPT | Mod: 26 | Performed by: INTERNAL MEDICINE

## 2018-07-02 PROCEDURE — 93325 DOPPLER ECHO COLOR FLOW MAPG: CPT | Mod: 26 | Performed by: INTERNAL MEDICINE

## 2018-07-02 PROCEDURE — 93018 CV STRESS TEST I&R ONLY: CPT | Performed by: INTERNAL MEDICINE

## 2018-07-02 PROCEDURE — 93350 STRESS TTE ONLY: CPT | Mod: 26 | Performed by: INTERNAL MEDICINE

## 2018-07-02 RX ADMIN — HUMAN ALBUMIN MICROSPHERES AND PERFLUTREN 6 ML: 10; .22 INJECTION, SOLUTION INTRAVENOUS at 16:28

## 2018-07-03 ENCOUNTER — TELEPHONE (OUTPATIENT)
Dept: CARDIOLOGY | Facility: CLINIC | Age: 68
End: 2018-07-03

## 2018-07-03 NOTE — TELEPHONE ENCOUNTER
Stress echo results noted, please see interpretation below. Pt is scheduled for follow up with [facility] on 7/31/18. Called and spoke to pt, communicated results: no evidence of stress induced ischemia, mild-moderate AS, short bursts of PAT seen. Inquired if pt is having any symptoms, and pt stated he is not and his chest heaviness has improved. Advised pt will route to Dr. Du as an update and if any further recommendations prior to visit with [facility], will call back to pt.     Interpretation Summary  Short bursts of PAT during exercise and recovery.  The Duke treadmill score was low risk ( >5 Duke score).  The patient exhibited no chest pain during exercise.  Mild to moderate valvular aortic stenosis.  This was a normal stress echocardiogram with no evidence of stress-induced  ischemia. This was a normal stress EKG with no evidence of stress-induced  ischemia.

## 2018-07-05 NOTE — TELEPHONE ENCOUNTER
Received message below from Dr. Du:     Duc Du MD Hair, Ashley W RN        Caller: Unspecified (2 days ago,  9:27 AM)                      Great - glad to hear. No new recs

## 2018-07-10 DIAGNOSIS — I10 ESSENTIAL HYPERTENSION: ICD-10-CM

## 2018-07-10 RX ORDER — AMLODIPINE BESYLATE 10 MG/1
10 TABLET ORAL DAILY
Qty: 90 TABLET | Refills: 0 | Status: SHIPPED | OUTPATIENT
Start: 2018-07-10 | End: 2018-08-27

## 2018-07-31 ENCOUNTER — OFFICE VISIT (OUTPATIENT)
Dept: CARDIOLOGY | Facility: CLINIC | Age: 68
End: 2018-07-31
Attending: INTERNAL MEDICINE
Payer: COMMERCIAL

## 2018-07-31 VITALS
BODY MASS INDEX: 37.95 KG/M2 | HEIGHT: 69 IN | DIASTOLIC BLOOD PRESSURE: 64 MMHG | SYSTOLIC BLOOD PRESSURE: 114 MMHG | HEART RATE: 70 BPM | WEIGHT: 256.2 LBS

## 2018-07-31 DIAGNOSIS — E78.5 HYPERLIPIDEMIA LDL GOAL <70: ICD-10-CM

## 2018-07-31 DIAGNOSIS — R07.9 CHEST PAIN, UNSPECIFIED TYPE: ICD-10-CM

## 2018-07-31 PROCEDURE — 99214 OFFICE O/P EST MOD 30 MIN: CPT | Performed by: NURSE PRACTITIONER

## 2018-07-31 NOTE — LETTER
7/31/2018    Sinan Stephens MD  [address] [facility] [address]    RE: Giacomo Solis       Dear Colleague,    I had the pleasure of seeing Giacomo Solis in the [facility].    Cardiology Clinic Progress Note  Giacomo Solis MRN# [id number]   YOB: 1950 Age: 68 year old     Reason for visit: Follow up stress echocardiogram           Assessment and Plan:     1. Moderate nonocclusive coronary artery disease    30-40% diffuse stenosis in the mid to distal segment of the left main, 30-40% diffuse stenosis of the LAD, 30-40% diffuse stenosis of the circumflex.      Continue risk factor modification including aspirin and statin     Follow-up with Dr. Du in 1 year    2. Moderate aortic stenosis    Mean gradient 20 mmHg    Repeat echocardiogram in 1 year    3. Ascending aortic dilatation    Most recent echocardiogram in May 2016 measured ascending aorta at 3.8 cm    Repeat echocardiogram in 1 year    4. Hyperlipidemia    Most recent fasting lipid panel was from September 2017 showing total cholesterol 140, HDL 45, LDL 53 and triglycerides 207    Continue rosuvastatin 40 mg daily    5. Hypertension    Continue amlodipine 10 mg daily, furosemide 40 mg daily, lisinopril 40 mg daily and metoprolol 75 mg daily         History of Presenting Illness:    Giacomo Solis is a very pleasant 68 year old patient of Dr. Du who presents today for a follow up to review a stress echocardiogram.  He has a past medical history significant for a non-ST elevation MI for which he presented initially in November 2016.  During that hospitalization he was found to have moderate aortic valve stenosis with a mean gradient of 18 mmHg (most recently 20 mmHg) and an ascending aortic dilatation that ranges between 3.8 and 4.1 cm.  He underwent a coronary angiogram that showed moderate disease including left main 30% stenosis and small distal vessels.  He does have a history of  "paroxysmal fibrillation and was previously on Xarelto but was discontinued secondary to GI bleed.  His other past medical history includes hypertension, hyperlipidemia and obesity.    His most recent echocardiogram showed mild to moderate valvular aortic stenosis with mean gradient of 20 mmHg, normal LV size and function with EF estimated at 55-60%.  Due to complaints of chest heaviness he underwent a stress echocardiogram.  Patient did not have any chest heaviness with exercise and was a normal stress echocardiogram without evidence of stress-induced ischemia and normal stress EKG.  Again he had mild to moderate aortic valve stenosis.    Today in clinic he presents with his wife.  He denies any recurrent chest heaviness.  Discussed in detail with the results of his stress echocardiogram.  Also encouraged weight loss with diet modification and exercise.               Review of Systems:   Review of Systems:  Skin:  Negative     Eyes:  Positive for glasses  ENT:  Positive for hearing loss  Respiratory:  Negative    Cardiovascular:    Positive for;edema (edema in legs at the end of the day)  Gastroenterology: Negative    Genitourinary:  Negative    Musculoskeletal:  Positive for back pain  Neurologic:  Negative    Psychiatric:  Negative    Heme/Lymph/Imm:  Negative    Endocrine:  Positive for                Physical Exam:     Vitals: /64  Pulse 70  Ht 1.753 m (5' 9\")  Wt 116.2 kg (256 lb 3.2 oz)  BMI 37.83 kg/m2  Constitutional:  cooperative, alert and oriented, well developed, well nourished, in no acute distress obese      Skin:  warm and dry to the touch        Head:  normocephalic        Eyes:  pupils equal and round        ENT:  no pallor or cyanosis        Chest:  clear to auscultation;normal symmetry        Cardiac: regular rhythm;normal S1 and S2       systolic murmur          Abdomen:    obese      Extremities and Back:       Trace bilateral     Neurological:  affect appropriate               " Medications:     Current Outpatient Prescriptions   Medication Sig Dispense Refill     amLODIPine (NORVASC) 10 MG tablet Take 1 tablet (10 mg) by mouth daily 90 tablet 0     aspirin 81 MG tablet Take 1 tablet by mouth daily.       fluorouracil (EFUDEX) 5 % cream Apply topically 2 times daily Twice daily for two weeks this fall to face 40 g 0     furosemide (LASIX) 20 MG tablet TAKE 2 TABLETS(40 MG) BY MOUTH EVERY MORNING 180 tablet 2     lisinopril (PRINIVIL/ZESTRIL) 40 MG tablet Take 1 tablet (40 mg) by mouth daily 90 tablet 3     metoprolol (TOPROL-XL) 50 MG 24 hr tablet Take 1.5 tablets (75 mg) by mouth every morning 135 tablet 3     omeprazole (PRILOSEC) 20 MG CR capsule TAKE 1 CAPSULE(20 MG) BY MOUTH DAILY 90 capsule 2     rosuvastatin (CRESTOR) 40 MG tablet Take 1 tablet (40 mg) by mouth daily 90 tablet 3           Family History   Problem Relation Age of Onset     Hypertension Mother      B:1920     HEART DISEASE Father      D: 70's  MI     Hypertension Brother      lipids as well     Hypertension Brother      Respiratory Brother      PRACHI     Diabetes No family hx of      Cerebrovascular Disease No family hx of        Social History     Social History     Marital status:      Spouse name: N/A     Number of children: N/A     Years of education: N/A     Occupational History     Not on file.     Social History Main Topics     Smoking status: Former Smoker     Packs/day: 0.75     Years: 20.00     Types: Cigarettes     Quit date: 5/11/2006     Smokeless tobacco: Never Used      Comment: had quit for 1 year, smoked on and off for years, quit again 5/06     Alcohol use 2.4 oz/week     0 Standard drinks or equivalent, 4 Glasses of wine per week      Comment: 2 glasses of wine daily      Drug use: No     Sexual activity: No     Other Topics Concern     Special Diet No     Exercise No     Social History Narrative            Past Medical History:     Past Medical History:   Diagnosis Date     Atrial fibrillation  (H)     paroxysmal     Basal cell carcinoma      Coronary artery disease involving native coronary artery of native heart 11/30/16    NSTEMI (peak troponin 0.05); coronary angiogram showed modeerate nonocclusive disease: LM 30-40 %, LAD 30-40%, CX 30-40 %     Diverticulosis of colon 4/16/12    sigmoid diverticulosis per CT scan; episode acute diverticulitis per CT scan     Heart valve insufficiency      Lipoma of other skin and subcutaneous tissue      Lumbar spinal stenosis 9/1/10    lumbar spinal stenosis at L5     NSTEMI (non-ST elevated myocardial infarction) (H) 11/30/16    NSTEMI (peak troponin 0.05); no acute lesions on angiogram     Obesity (BMI 30-39.9) 7/9/13    BMI 38     Obstructive sleep apnea 10/7/14    per sleep study:  AHI 25/hr, desats to 84%. Supine /hr, only 6 minutes spent supine     Other and unspecified hyperlipidemia      Prediabetes 7/9/13    elevated fasting glucoses     Tubular adenoma of colon 8/13/13    3 adenomatous poltyps removed, 4 hyperplastic polyps removed     Unspecified essential hypertension               Past Surgical History:     Past Surgical History:   Procedure Laterality Date     COLONOSCOPY  8/13/13    3 adenomatous polyps and 4 hyperplastic polyps removed     HC TOOTH EXTRACTION W/FORCEP  1983    4 wisdom teeth removed withou difficulty              Allergies:   No known allergies       Data:   All laboratory data reviewed:    LAST CHOLESTEROL:  Lab Results   Component Value Date    CHOL 140 09/06/2017     Lab Results   Component Value Date    HDL 45 09/06/2017     Lab Results   Component Value Date    LDL 53 09/06/2017     Lab Results   Component Value Date    TRIG 208 09/06/2017     Lab Results   Component Value Date    CHOLHDLRATIO 5.1 06/23/2015       LAST BMP:  Lab Results   Component Value Date     09/06/2017      Lab Results   Component Value Date    POTASSIUM 3.5 09/06/2017     Lab Results   Component Value Date    CHLORIDE 105 09/06/2017     Lab  Results   Component Value Date    LASHAY 8.8 09/06/2017     Lab Results   Component Value Date    CO2 27 09/06/2017     Lab Results   Component Value Date    BUN 16 09/06/2017     Lab Results   Component Value Date    CR 0.75 09/06/2017     Lab Results   Component Value Date     09/06/2017       LAST CBC:  Lab Results   Component Value Date    WBC 6.4 09/06/2017     Lab Results   Component Value Date    RBC 4.91 09/06/2017     Lab Results   Component Value Date    HGB 14.1 09/06/2017     Lab Results   Component Value Date    HCT 43.3 09/06/2017     Lab Results   Component Value Date    MCV 88 09/06/2017     Lab Results   Component Value Date    MCH 28.7 09/06/2017     Lab Results   Component Value Date    MCHC 32.6 09/06/2017     Lab Results   Component Value Date    RDW 13.9 09/06/2017     Lab Results   Component Value Date     09/06/2017       Thank you for allowing me to participate in the care of your patient.    Sincerely,     SHAINA CHAMBERS [facility]

## 2018-07-31 NOTE — PROGRESS NOTES
Cardiology Clinic Progress Note  Giacomo Solis MRN# [id number]   YOB: 1950 Age: 68 year old     Reason for visit: Follow up stress echocardiogram           Assessment and Plan:     1. Moderate nonocclusive coronary artery disease    30-40% diffuse stenosis in the mid to distal segment of the left main, 30-40% diffuse stenosis of the LAD, 30-40% diffuse stenosis of the circumflex.      Continue risk factor modification including aspirin and statin     Follow-up with Dr. Du in 1 year    2. Moderate aortic stenosis    Mean gradient 20 mmHg    Repeat echocardiogram in 1 year    3. Ascending aortic dilatation    Most recent echocardiogram in May 2016 measured ascending aorta at 3.8 cm    Repeat echocardiogram in 1 year    4. Hyperlipidemia    Most recent fasting lipid panel was from September 2017 showing total cholesterol 140, HDL 45, LDL 53 and triglycerides 207    Continue rosuvastatin 40 mg daily    5. Hypertension    Continue amlodipine 10 mg daily, furosemide 40 mg daily, lisinopril 40 mg daily and metoprolol 75 mg daily         History of Presenting Illness:    Giacomo Solis is a very pleasant 68 year old patient of Dr. Du who presents today for a follow up to review a stress echocardiogram.  He has a past medical history significant for a non-ST elevation MI for which he presented initially in November 2016.  During that hospitalization he was found to have moderate aortic valve stenosis with a mean gradient of 18 mmHg (most recently 20 mmHg) and an ascending aortic dilatation that ranges between 3.8 and 4.1 cm.  He underwent a coronary angiogram that showed moderate disease including left main 30% stenosis and small distal vessels.  He does have a history of paroxysmal fibrillation and was previously on Xarelto but was discontinued secondary to GI bleed.  His other past medical history includes hypertension, hyperlipidemia and obesity.    His most recent echocardiogram showed mild to  "moderate valvular aortic stenosis with mean gradient of 20 mmHg, normal LV size and function with EF estimated at 55-60%.  Due to complaints of chest heaviness he underwent a stress echocardiogram.  Patient did not have any chest heaviness with exercise and was a normal stress echocardiogram without evidence of stress-induced ischemia and normal stress EKG.  Again he had mild to moderate aortic valve stenosis.    Today in clinic he presents with his wife.  He denies any recurrent chest heaviness.  Discussed in detail with the results of his stress echocardiogram.  Also encouraged weight loss with diet modification and exercise.               Review of Systems:   Review of Systems:  Skin:  Negative     Eyes:  Positive for glasses  ENT:  Positive for hearing loss  Respiratory:  Negative    Cardiovascular:    Positive for;edema (edema in legs at the end of the day)  Gastroenterology: Negative    Genitourinary:  Negative    Musculoskeletal:  Positive for back pain  Neurologic:  Negative    Psychiatric:  Negative    Heme/Lymph/Imm:  Negative    Endocrine:  Positive for                Physical Exam:     Vitals: /64  Pulse 70  Ht 1.753 m (5' 9\")  Wt 116.2 kg (256 lb 3.2 oz)  BMI 37.83 kg/m2  Constitutional:  cooperative, alert and oriented, well developed, well nourished, in no acute distress obese      Skin:  warm and dry to the touch        Head:  normocephalic        Eyes:  pupils equal and round        ENT:  no pallor or cyanosis        Chest:  clear to auscultation;normal symmetry        Cardiac: regular rhythm;normal S1 and S2       systolic murmur          Abdomen:    obese      Extremities and Back:       Trace bilateral     Neurological:  affect appropriate               Medications:     Current Outpatient Prescriptions   Medication Sig Dispense Refill     amLODIPine (NORVASC) 10 MG tablet Take 1 tablet (10 mg) by mouth daily 90 tablet 0     aspirin 81 MG tablet Take 1 tablet by mouth daily.       " fluorouracil (EFUDEX) 5 % cream Apply topically 2 times daily Twice daily for two weeks this fall to face 40 g 0     furosemide (LASIX) 20 MG tablet TAKE 2 TABLETS(40 MG) BY MOUTH EVERY MORNING 180 tablet 2     lisinopril (PRINIVIL/ZESTRIL) 40 MG tablet Take 1 tablet (40 mg) by mouth daily 90 tablet 3     metoprolol (TOPROL-XL) 50 MG 24 hr tablet Take 1.5 tablets (75 mg) by mouth every morning 135 tablet 3     omeprazole (PRILOSEC) 20 MG CR capsule TAKE 1 CAPSULE(20 MG) BY MOUTH DAILY 90 capsule 2     rosuvastatin (CRESTOR) 40 MG tablet Take 1 tablet (40 mg) by mouth daily 90 tablet 3           Family History   Problem Relation Age of Onset     Hypertension Mother      B:1920     HEART DISEASE Father      D: 70's  MI     Hypertension Brother      lipids as well     Hypertension Brother      Respiratory Brother      PRACHI     Diabetes No family hx of      Cerebrovascular Disease No family hx of        Social History     Social History     Marital status:      Spouse name: N/A     Number of children: N/A     Years of education: N/A     Occupational History     Not on file.     Social History Main Topics     Smoking status: Former Smoker     Packs/day: 0.75     Years: 20.00     Types: Cigarettes     Quit date: 5/11/2006     Smokeless tobacco: Never Used      Comment: had quit for 1 year, smoked on and off for years, quit again 5/06     Alcohol use 2.4 oz/week     0 Standard drinks or equivalent, 4 Glasses of wine per week      Comment: 2 glasses of wine daily      Drug use: No     Sexual activity: No     Other Topics Concern     Special Diet No     Exercise No     Social History Narrative            Past Medical History:     Past Medical History:   Diagnosis Date     Atrial fibrillation (H)     paroxysmal     Basal cell carcinoma      Coronary artery disease involving native coronary artery of native heart 11/30/16    NSTEMI (peak troponin 0.05); coronary angiogram showed modeerate nonocclusive disease: LM 30-40  %, LAD 30-40%, CX 30-40 %     Diverticulosis of colon 4/16/12    sigmoid diverticulosis per CT scan; episode acute diverticulitis per CT scan     Heart valve insufficiency      Lipoma of other skin and subcutaneous tissue      Lumbar spinal stenosis 9/1/10    lumbar spinal stenosis at L5     NSTEMI (non-ST elevated myocardial infarction) (H) 11/30/16    NSTEMI (peak troponin 0.05); no acute lesions on angiogram     Obesity (BMI 30-39.9) 7/9/13    BMI 38     Obstructive sleep apnea 10/7/14    per sleep study:  AHI 25/hr, desats to 84%. Supine /hr, only 6 minutes spent supine     Other and unspecified hyperlipidemia      Prediabetes 7/9/13    elevated fasting glucoses     Tubular adenoma of colon 8/13/13    3 adenomatous poltyps removed, 4 hyperplastic polyps removed     Unspecified essential hypertension               Past Surgical History:     Past Surgical History:   Procedure Laterality Date     COLONOSCOPY  8/13/13    3 adenomatous polyps and 4 hyperplastic polyps removed     HC TOOTH EXTRACTION W/FORCEP  1983    4 wisdom teeth removed withou difficulty              Allergies:   No known allergies       Data:   All laboratory data reviewed:    LAST CHOLESTEROL:  Lab Results   Component Value Date    CHOL 140 09/06/2017     Lab Results   Component Value Date    HDL 45 09/06/2017     Lab Results   Component Value Date    LDL 53 09/06/2017     Lab Results   Component Value Date    TRIG 208 09/06/2017     Lab Results   Component Value Date    CHOLHDLRATIO 5.1 06/23/2015       LAST BMP:  Lab Results   Component Value Date     09/06/2017      Lab Results   Component Value Date    POTASSIUM 3.5 09/06/2017     Lab Results   Component Value Date    CHLORIDE 105 09/06/2017     Lab Results   Component Value Date    LASHAY 8.8 09/06/2017     Lab Results   Component Value Date    CO2 27 09/06/2017     Lab Results   Component Value Date    BUN 16 09/06/2017     Lab Results   Component Value Date    CR 0.75 09/06/2017      Lab Results   Component Value Date     09/06/2017       LAST CBC:  Lab Results   Component Value Date    WBC 6.4 09/06/2017     Lab Results   Component Value Date    RBC 4.91 09/06/2017     Lab Results   Component Value Date    HGB 14.1 09/06/2017     Lab Results   Component Value Date    HCT 43.3 09/06/2017     Lab Results   Component Value Date    MCV 88 09/06/2017     Lab Results   Component Value Date    MCH 28.7 09/06/2017     Lab Results   Component Value Date    MCHC 32.6 09/06/2017     Lab Results   Component Value Date    RDW 13.9 09/06/2017     Lab Results   Component Value Date     09/06/2017         HILARIO JOSE, APRN, CNP

## 2018-07-31 NOTE — PATIENT INSTRUCTIONS
Today's Recommendations    1. Stress test showed no ischemia  2. Diet and exercise  3. Continue all medications without changes.  4. Please follow up with Dr. Du in 1 year with a heart ultrasound.    Please send a Zoomingo message or call [phone] with questions or concerns.     Scheduling number [phone].

## 2018-07-31 NOTE — MR AVS SNAPSHOT
After Visit Summary   7/31/2018    Giacomo LANIER [place]    MRN: [id number]           Patient Information     Date Of Birth          1950        Visit Information        Provider Department      7/31/2018 1:30 PM Hope Iverson APRN CNP [facility]        Today's Diagnoses     Hyperlipidemia LDL goal <70        Chest pain, unspecified type          Care Instructions    Today's Recommendations    1. Stress test showed no ischemia  2. Diet and exercise  3. Continue all medications without changes.  4. Please follow up with Dr. Du in 1 year with a heart ultrasound.    Please send a Brandpotion message or call [phone] with questions or concerns.     Scheduling number [phone].            Follow-ups after your visit        Who to contact     If you have questions or need follow up information about today's clinic visit or your schedule please contact [facility] directly at [phone].  Normal or non-critical lab and imaging results will be communicated to you by CourseHorsehart, letter or phone within 4 business days after the clinic has received the results. If you do not hear from us within 7 days, please contact the clinic through Brandpotion or phone. If you have a critical or abnormal lab result, we will notify you by phone as soon as possible.  Submit refill requests through Brandpotion or call your pharmacy and they will forward the refill request to us. Please allow 3 business days for your refill to be completed.          Additional Information About Your Visit        CourseHorseharNetstory Information     Brandpotion gives you secure access to your electronic health record. If you see a primary care provider, you can also send messages to your care team and make appointments. If you have questions, please call your primary care clinic.  If you do not have a primary care provider, please call [phone] and they will assist you.        Care  "EveryWhere ID     This is your Care EveryWhere ID. This could be used by other organizations to access your Driver medical records  [id number]        Your Vitals Were     Pulse Height BMI (Body Mass Index)             70 1.753 m (5' 9\") 37.83 kg/m2          Blood Pressure from Last 3 Encounters:   07/31/18 114/64   06/28/18 125/75   09/18/17 126/70    Weight from Last 3 Encounters:   07/31/18 116.2 kg (256 lb 3.2 oz)   06/28/18 115.7 kg (255 lb)   09/18/17 117.5 kg (259 lb 1.6 oz)              We Performed the Following     Follow-Up with Cardiac Advanced Practice Provider        Primary Care Provider Office Phone # Fax #    Sinan Stephens [phone]       [address] [facility] [address]        Equal Access to Services     [facility]: Hadii greg grier hadasho Sokori, waaxda luqadaha, qaybta kaalmada adeegyada, jamie ken . So [facility] [phone].    ATENCIÓN: Si habla español, tiene a galindo disposición servicios gratuitos de asistencia lingüística. Llame al [phone].    We comply with applicable federal civil rights laws and [place] laws. We do not discriminate on the basis of race, color, national origin, age, disability, sex, sexual orientation, or gender identity.            Thank you!     Thank you for choosing [facility]  for your care. Our goal is always to provide you with excellent care. Hearing back from our patients is one way we can continue to improve our services. Please take a few minutes to complete the written survey that you may receive in the mail after your visit with us. Thank you!             Your Updated Medication List - Protect others around you: Learn how to safely use, store and throw away your medicines at www.disposemymeds.org.          This list is accurate as of 7/31/18  1:46 PM.  Always use your most recent med list.                   Brand Name Dispense Instructions for use " Diagnosis    amLODIPine 10 MG tablet    NORVASC    90 tablet    Take 1 tablet (10 mg) by mouth daily    Essential hypertension       aspirin 81 MG tablet      Take 1 tablet by mouth daily.    Unspecified essential hypertension       fluorouracil 5 % cream    EFUDEX    40 g    Apply topically 2 times daily Twice daily for two weeks this fall to face    AK (actinic keratosis)       furosemide 20 MG tablet    LASIX    180 tablet    TAKE 2 TABLETS(40 MG) BY MOUTH EVERY MORNING    Bilateral lower extremity edema       lisinopril 40 MG tablet    PRINIVIL/ZESTRIL    90 tablet    Take 1 tablet (40 mg) by mouth daily    Essential hypertension       metoprolol succinate 50 MG 24 hr tablet    TOPROL-XL    135 tablet    Take 1.5 tablets (75 mg) by mouth every morning    NSTEMI (non-ST elevated myocardial infarction) (H)       omeprazole 20 MG CR capsule    priLOSEC    90 capsule    TAKE 1 CAPSULE(20 MG) BY MOUTH DAILY    Gastroesophageal reflux disease without esophagitis       rosuvastatin 40 MG tablet    CRESTOR    90 tablet    Take 1 tablet (40 mg) by mouth daily    Hyperlipidemia LDL goal <70

## 2018-08-25 DIAGNOSIS — R60.0 BILATERAL LOWER EXTREMITY EDEMA: ICD-10-CM

## 2018-08-25 NOTE — TELEPHONE ENCOUNTER
"Requested Prescriptions   Pending Prescriptions Disp Refills     furosemide (LASIX) 20 MG tablet [Pharmacy Med Name: FUROSEMIDE 20MG TABLETS] 180 tablet 0    Last Written Prescription Date:  10/24/2017  Last Fill Quantity: 180,  # refills: 2   Last office visit: 9/18/2017 with prescribing provider:  9/18/2017   Future Office Visit:     Sig: TAKE 2 TABLETS(40 MG) BY MOUTH EVERY MORNING    Diuretics (Including Combos) Protocol Passed    8/25/2018  9:18 AM       Passed - Blood pressure under 140/90 in past 12 months    BP Readings from Last 3 Encounters:   07/31/18 114/64   06/28/18 125/75   09/18/17 126/70                Passed - Recent (12 mo) or future (30 days) visit within the authorizing provider's specialty    Patient had office visit in the last 12 months or has a visit in the next 30 days with authorizing provider or within the authorizing provider's specialty.  See \"Patient Info\" tab in inbasket, or \"Choose Columns\" in Meds & Orders section of the refill encounter.           Passed - Patient is age 18 or older       Passed - Normal serum creatinine on file in past 12 months    Recent Labs   Lab Test  09/06/17   0806   CR  0.75             Passed - Normal serum potassium on file in past 12 months    Recent Labs   Lab Test  09/06/17   0806   POTASSIUM  3.5                   Passed - Normal serum sodium on file in past 12 months    Recent Labs   Lab Test  09/06/17   0806   NA  140                "

## 2018-08-27 DIAGNOSIS — I10 ESSENTIAL HYPERTENSION: ICD-10-CM

## 2018-08-27 RX ORDER — AMLODIPINE BESYLATE 10 MG/1
10 TABLET ORAL DAILY
Qty: 90 TABLET | Refills: 3 | Status: SHIPPED | OUTPATIENT
Start: 2018-08-27 | End: 2018-09-21

## 2018-08-28 RX ORDER — FUROSEMIDE 20 MG
TABLET ORAL
Qty: 180 TABLET | Refills: 0 | Status: SHIPPED | OUTPATIENT
Start: 2018-08-28 | End: 2018-09-21

## 2018-09-05 DIAGNOSIS — I10 ESSENTIAL HYPERTENSION: ICD-10-CM

## 2018-09-05 RX ORDER — LISINOPRIL 40 MG/1
TABLET ORAL
Qty: 30 TABLET | Refills: 0 | Status: SHIPPED | OUTPATIENT
Start: 2018-09-05 | End: 2018-09-21

## 2018-09-05 NOTE — TELEPHONE ENCOUNTER
"Requested Prescriptions   Pending Prescriptions Disp Refills     lisinopril (PRINIVIL/ZESTRIL) 40 MG tablet [Pharmacy Med Name: LISINOPRIL 40MG TABLETS] 90 tablet 0      Last Written Prescription Date:  09/18/17  Last Fill Quantity: 90,  # refills: 3   Last office visit: 9/18/2017 with prescribing provider:  09/18/17   Future Office Visit:  0 Sig: TAKE 1 TABLET(40 MG) BY MOUTH DAILY    ACE Inhibitors (Including Combos) Protocol Passed    9/5/2018 10:27 AM       Passed - Blood pressure under 140/90 in past 12 months    BP Readings from Last 3 Encounters:   07/31/18 114/64   06/28/18 125/75   09/18/17 126/70                Passed - Recent (12 mo) or future (30 days) visit within the authorizing provider's specialty    Patient had office visit in the last 12 months or has a visit in the next 30 days with authorizing provider or within the authorizing provider's specialty.  See \"Patient Info\" tab in inbasket, or \"Choose Columns\" in Meds & Orders section of the refill encounter.           Passed - Patient is age 18 or older       Passed - Normal serum creatinine on file in past 12 months    Recent Labs   Lab Test  09/06/17   0806   CR  0.75            Passed - Normal serum potassium on file in past 12 months    Recent Labs   Lab Test  09/06/17   0806   POTASSIUM  3.5             "

## 2018-09-05 NOTE — LETTER
[facility]  [address] [id number] [phone]            Giacomo LANIER [place]  [address] [facility] [address]        September 5, 2018    Dear Giacomo,    While refilling your prescription today, we noticed that you are due for an appointment with your provider.  We will refill your prescription for 30 days, but a follow-up appointment must be made before any additional refills can be approved.     Taking care of your health is important to us and we look forward to seeing you in the near future.  Please call us at [phone] or [phone] (or use NAVX) to schedule an appointment.     Please disregard this notice if you have already made an appointment.    Sincerely,        [facility]

## 2018-09-05 NOTE — TELEPHONE ENCOUNTER
Medication is being filled for 1 time refill only due to:  Patient needs to be seen because it has been more than one year since last visit.     grandparent(s)

## 2018-09-18 DIAGNOSIS — R73.03 PREDIABETES: ICD-10-CM

## 2018-09-18 DIAGNOSIS — I25.10 CORONARY ARTERY DISEASE INVOLVING NATIVE CORONARY ARTERY OF NATIVE HEART WITHOUT ANGINA PECTORIS: ICD-10-CM

## 2018-09-18 DIAGNOSIS — I10 ESSENTIAL HYPERTENSION: ICD-10-CM

## 2018-09-18 LAB
ALBUMIN SERPL-MCNC: 3.6 G/DL (ref 3.4–5)
ALP SERPL-CCNC: 76 U/L (ref 40–150)
ALT SERPL W P-5'-P-CCNC: 30 U/L (ref 0–70)
ANION GAP SERPL CALCULATED.3IONS-SCNC: 8 MMOL/L (ref 3–14)
AST SERPL W P-5'-P-CCNC: 15 U/L (ref 0–45)
BILIRUB SERPL-MCNC: 0.7 MG/DL (ref 0.2–1.3)
BUN SERPL-MCNC: 16 MG/DL (ref 7–30)
CALCIUM SERPL-MCNC: 8.4 MG/DL (ref 8.5–10.1)
CHLORIDE SERPL-SCNC: 106 MMOL/L (ref 94–109)
CHOLEST SERPL-MCNC: 136 MG/DL
CO2 SERPL-SCNC: 26 MMOL/L (ref 20–32)
CREAT SERPL-MCNC: 0.78 MG/DL (ref 0.66–1.25)
ERYTHROCYTE [DISTWIDTH] IN BLOOD BY AUTOMATED COUNT: 14.4 % (ref 10–15)
GFR SERPL CREATININE-BSD FRML MDRD: >90 ML/MIN/1.7M2
GLUCOSE SERPL-MCNC: 117 MG/DL (ref 70–99)
HBA1C MFR BLD: 6 % (ref 0–5.6)
HCT VFR BLD AUTO: 44.4 % (ref 40–53)
HDLC SERPL-MCNC: 36 MG/DL
HGB BLD-MCNC: 14.4 G/DL (ref 13.3–17.7)
LDLC SERPL CALC-MCNC: 70 MG/DL
MCH RBC QN AUTO: 27.7 PG (ref 26.5–33)
MCHC RBC AUTO-ENTMCNC: 32.4 G/DL (ref 31.5–36.5)
MCV RBC AUTO: 85 FL (ref 78–100)
NONHDLC SERPL-MCNC: 100 MG/DL
PLATELET # BLD AUTO: 209 10E9/L (ref 150–450)
POTASSIUM SERPL-SCNC: 4 MMOL/L (ref 3.4–5.3)
PROT SERPL-MCNC: 7.1 G/DL (ref 6.8–8.8)
RBC # BLD AUTO: 5.2 10E12/L (ref 4.4–5.9)
SODIUM SERPL-SCNC: 140 MMOL/L (ref 133–144)
TRIGL SERPL-MCNC: 150 MG/DL
WBC # BLD AUTO: 5.8 10E9/L (ref 4–11)

## 2018-09-18 PROCEDURE — 85027 COMPLETE CBC AUTOMATED: CPT | Performed by: INTERNAL MEDICINE

## 2018-09-18 PROCEDURE — 80061 LIPID PANEL: CPT | Performed by: INTERNAL MEDICINE

## 2018-09-18 PROCEDURE — 36415 COLL VENOUS BLD VENIPUNCTURE: CPT | Performed by: INTERNAL MEDICINE

## 2018-09-18 PROCEDURE — 80053 COMPREHEN METABOLIC PANEL: CPT | Performed by: INTERNAL MEDICINE

## 2018-09-18 PROCEDURE — 83036 HEMOGLOBIN GLYCOSYLATED A1C: CPT | Performed by: INTERNAL MEDICINE

## 2018-09-21 ENCOUNTER — OFFICE VISIT (OUTPATIENT)
Dept: INTERNAL MEDICINE | Facility: CLINIC | Age: 68
End: 2018-09-21
Payer: COMMERCIAL

## 2018-09-21 VITALS
DIASTOLIC BLOOD PRESSURE: 72 MMHG | OXYGEN SATURATION: 95 % | BODY MASS INDEX: 38.09 KG/M2 | HEART RATE: 64 BPM | RESPIRATION RATE: 14 BRPM | TEMPERATURE: 98.4 F | WEIGHT: 257.9 LBS | SYSTOLIC BLOOD PRESSURE: 118 MMHG

## 2018-09-21 DIAGNOSIS — R60.0 BILATERAL LOWER EXTREMITY EDEMA: ICD-10-CM

## 2018-09-21 DIAGNOSIS — I10 ESSENTIAL HYPERTENSION: ICD-10-CM

## 2018-09-21 DIAGNOSIS — Z12.5 SPECIAL SCREENING FOR MALIGNANT NEOPLASM OF PROSTATE: ICD-10-CM

## 2018-09-21 DIAGNOSIS — E78.5 HYPERLIPIDEMIA LDL GOAL <70: ICD-10-CM

## 2018-09-21 DIAGNOSIS — I21.4 NSTEMI (NON-ST ELEVATED MYOCARDIAL INFARCTION) (H): Primary | ICD-10-CM

## 2018-09-21 PROCEDURE — 99214 OFFICE O/P EST MOD 30 MIN: CPT | Performed by: INTERNAL MEDICINE

## 2018-09-21 RX ORDER — ROSUVASTATIN CALCIUM 40 MG/1
40 TABLET, COATED ORAL DAILY
Qty: 90 TABLET | Refills: 3 | Status: SHIPPED | OUTPATIENT
Start: 2018-09-21 | End: 2019-03-15

## 2018-09-21 RX ORDER — METOPROLOL SUCCINATE 50 MG/1
75 TABLET, EXTENDED RELEASE ORAL EVERY MORNING
Qty: 135 TABLET | Refills: 3 | Status: SHIPPED | OUTPATIENT
Start: 2018-09-21 | End: 2019-10-09

## 2018-09-21 RX ORDER — FUROSEMIDE 20 MG
40 TABLET ORAL EVERY MORNING
Qty: 180 TABLET | Refills: 3 | Status: SHIPPED | OUTPATIENT
Start: 2018-09-21 | End: 2019-10-17

## 2018-09-21 RX ORDER — AMLODIPINE BESYLATE 10 MG/1
10 TABLET ORAL DAILY
Qty: 90 TABLET | Refills: 3 | Status: SHIPPED | OUTPATIENT
Start: 2018-09-21 | End: 2019-10-15

## 2018-09-21 RX ORDER — LISINOPRIL 40 MG/1
40 TABLET ORAL DAILY
Qty: 90 TABLET | Refills: 3 | Status: SHIPPED | OUTPATIENT
Start: 2018-09-21 | End: 2019-10-15

## 2018-09-21 NOTE — MR AVS SNAPSHOT
"              After Visit Summary   9/21/2018    Giacomo LANIER Lovilia    MRN: [id number]           Patient Information     Date Of Birth          1950        Visit Information        Provider Department      9/21/2018 11:20 AM Sinan Stephens MD [facility]        Today's Diagnoses     NSTEMI (non-ST elevated myocardial infarction) (H)    -  1    Need for prophylactic vaccination and inoculation against influenza        Need for prophylactic vaccination against Streptococcus pneumoniae (pneumococcus)        Essential hypertension        Bilateral lower extremity edema        Hyperlipidemia LDL goal <70        Special screening for malignant neoplasm of prostate          Care Instructions    *  Get the annnual influenza vaccine  at the pharmacy    *  Ge the pnuemovax 23 vaccine    *  Continue all medications at the same doses.  Contact your usual pharmacy if you need refills.     *  Return to see me in 1 year, sooner if needed.  Please get fasting labs done at the [facility] or any other [facility] lab 1-2 days before this appointment.  If you get the labs done at another [facility] make arrangements with them directly.  The orders will be in place.  Eat nothing for at least 8 hours prior to having these labs drawn.  Call [phone] to schedule appointments at [facility].       5 GOALS TO PREVENT VASCULAR DISEASE:     1.  Aggressive blood pressure control, under 130/80 ideally.  Using medications if needed.    Your blood pressure is under good control    BP Readings from Last 4 Encounters:   09/21/18 118/72   07/31/18 114/64   06/28/18 125/75   09/18/17 126/70       2.  Aggressive LDL cholesterol (\"bad cholesterol\") lowering as indicated.    Your goal is an LDL under 130 for sure, preferably under 100.  (If you have diabetes or previous vascular disease, the the LDL goals would be under 100 for sure, preferably under 70.)    New guidelines identify " "four high-risk groups who could benefit from statins:   *people with pre-existing heart disease, such as those who have had a heart attack;   *people ages 40 to 75 who have diabetes of any type  *patients ages 40 to 75 with at least a 7.5% risk of developing cardiovascular disease over the next decade, according to a formula described in the guidelines  *patients with the sort of super-high cholesterol that sometimes runs in families, as evidenced by an LDL of 190 milligrams per deciliter or higher    Your cholesterol levels are well controlled.    Recent Labs   Lab Test  09/18/18   0805  09/06/17   0806   06/23/15   0803  06/25/14   0712   CHOL  136  140   < >  184  188   HDL  36*  45   < >  36*  39*   LDL  70  53   < >  89  105   TRIG  150*  208*   < >  295*  218*   CHOLHDLRATIO   --    --    --   5.1*  4.8    < > = values in this interval not displayed.       3.  Aggressive diabetic prevention, screening and/or management.      Your diabetes is well controlled.  Continue yor diet    4.  No smoking    5.  Consider taking low dose aspirin (81 mg) tablet once per day over the age of 50, every day unless there is a specific reason that you cannot take aspirin (such as side effect, allergy, or you are on another \"blood thinner\").        --Based on your current cardiac risk factors, you should take Aspirin 81 mg once per day if you are over 50 years of age.            --Good Grains:  Oats, brown rice, Quinoa (these do not raise the blood sugar as much)     --Bad grains:  Anything made from wheat or white rice     (because these raise the blood sugars significantly, and the possible gluten issue from wheat for some people).      --Proteins:  Aim for \"lean proteins\" including chicken, fish, seafood, pork, turkey, and eggs (in moderation); Eat red meat only occasionally          Dino Glasgow:                            Follow-ups after your visit        Future tests that were ordered for you today     Open Future Orders "        Priority Expected Expires Ordered    **CBC with platelets FUTURE 1yr Routine 8/22/2019 9/21/2019 9/21/2018    **Basic metabolic panel FUTURE 1yr Routine 8/22/2019 9/21/2019 9/21/2018    **Prostate spec antigen screen FUTURE 1yr Routine 8/22/2019 9/21/2019 9/21/2018    Lipid panel reflex to direct LDL Fasting Routine 8/22/2019 9/21/2019 9/21/2018            Who to contact     If you have questions or need follow up information about today's clinic visit or your schedule please contact [facility] directly at [phone].  Normal or non-critical lab and imaging results will be communicated to you by MyChart, letter or phone within 4 business days after the clinic has received the results. If you do not hear from us within 7 days, please contact the clinic through [employer] or phone. If you have a critical or abnormal lab result, we will notify you by phone as soon as possible.  Submit refill requests through [employer] or call your pharmacy and they will forward the refill request to us. Please allow 3 business days for your refill to be completed.          Additional Information About Your Visit        MyChart Information     [employer] gives you secure access to your electronic health record. If you see a primary care provider, you can also send messages to your care team and make appointments. If you have questions, please call your primary care clinic.  If you do not have a primary care provider, please call [phone] and they will assist you.        Care EveryWhere ID     This is your Care EveryWhere ID. This could be used by other organizations to access your [facility] medical records  [id number]        Your Vitals Were     Pulse Temperature Respirations Pulse Oximetry BMI (Body Mass Index)       64 98.4  F (36.9  C) (Oral) 14 95% 38.09 kg/m2        Blood Pressure from Last 3 Encounters:   09/21/18 118/72   07/31/18 114/64   06/28/18 125/75    Weight from Last 3 Encounters:   09/21/18  257 lb 14.4 oz (117 kg)   07/31/18 256 lb 3.2 oz (116.2 kg)   06/28/18 255 lb (115.7 kg)                 Today's Medication Changes          These changes are accurate as of 9/21/18 12:25 PM.  If you have any questions, ask your nurse or doctor.               These medicines have changed or have updated prescriptions.        Dose/Directions    furosemide 20 MG tablet   Commonly known as:  LASIX   This may have changed:  See the new instructions.   Used for:  Bilateral lower extremity edema   Changed by:  Sinan Stephens MD        Dose:  40 mg   Take 2 tablets (40 mg) by mouth every morning   Quantity:  180 tablet   Refills:  3       lisinopril 40 MG tablet   Commonly known as:  PRINIVIL/ZESTRIL   This may have changed:  See the new instructions.   Used for:  Essential hypertension   Changed by:  Sinan Stephens MD        Dose:  40 mg   Take 1 tablet (40 mg) by mouth daily   Quantity:  90 tablet   Refills:  3            Where to get your medicines      These medications were sent to [facility] [address] [place] [address] [address] [facility] [place] [address] [address] [facility] [address]     Phone:  [phone]     amLODIPine 10 MG tablet    furosemide 20 MG tablet    lisinopril 40 MG tablet    metoprolol succinate 50 MG 24 hr tablet    rosuvastatin 40 MG tablet                Primary Care Provider Office Phone # Fax #    Sinan Stephens [phone]       [address] [facility] [address]        Equal Access to Services     LEVON COULTER AH: Hadii aad ku hadasho Soomaali, waaxda luqadaha, qaybta kaalmada adeegyada, waxay idiin hayaan candy crandall. So [facility] [phone].    ATENCIÓN: Si habla español, tiene a galindo disposición servicios gratuitos de asistencia lingüística. Llame al [phone].    We comply with applicable federal civil rights laws and [place] laws. We do not discriminate on the basis of race, color, national origin, age,  disability, sex, sexual orientation, or gender identity.            Thank you!     Thank you for choosing [facility]  for your care. Our goal is always to provide you with excellent care. Hearing back from our patients is one way we can continue to improve our services. Please take a few minutes to complete the written survey that you may receive in the mail after your visit with us. Thank you!             Your Updated Medication List - Protect others around you: Learn how to safely use, store and throw away your medicines at www.disposemymeds.org.          This list is accurate as of 9/21/18 12:25 PM.  Always use your most recent med list.                   Brand Name Dispense Instructions for use Diagnosis    amLODIPine 10 MG tablet    NORVASC    90 tablet    Take 1 tablet (10 mg) by mouth daily    Essential hypertension       aspirin 81 MG tablet      Take 1 tablet by mouth daily.    Unspecified essential hypertension       fluorouracil 5 % cream    EFUDEX    40 g    Apply topically 2 times daily Twice daily for two weeks this fall to face    AK (actinic keratosis)       furosemide 20 MG tablet    LASIX    180 tablet    Take 2 tablets (40 mg) by mouth every morning    Bilateral lower extremity edema       lisinopril 40 MG tablet    PRINIVIL/ZESTRIL    90 tablet    Take 1 tablet (40 mg) by mouth daily    Essential hypertension       metoprolol succinate 50 MG 24 hr tablet    TOPROL-XL    135 tablet    Take 1.5 tablets (75 mg) by mouth every morning    NSTEMI (non-ST elevated myocardial infarction) (H)       omeprazole 20 MG CR capsule    priLOSEC    90 capsule    TAKE 1 CAPSULE(20 MG) BY MOUTH DAILY    Gastroesophageal reflux disease without esophagitis       rosuvastatin 40 MG tablet    CRESTOR    90 tablet    Take 1 tablet (40 mg) by mouth daily    Hyperlipidemia LDL goal <70

## 2018-09-21 NOTE — PATIENT INSTRUCTIONS
"*  Get the annnual influenza vaccine  at the pharmacy    *  Ge the pnuemovax 23 vaccine    *  Continue all medications at the same doses.  Contact your usual pharmacy if you need refills.     *  Return to see me in 1 year, sooner if needed.  Please get fasting labs done at the [facility] or any other [facility] lab 1-2 days before this appointment.  If you get the labs done at another [facility], make arrangements with them directly.  The orders will be in place.  Eat nothing for at least 8 hours prior to having these labs drawn.  Call [phone] to schedule appointments at [facility].       5 GOALS TO PREVENT VASCULAR DISEASE:     1.  Aggressive blood pressure control, under 130/80 ideally.  Using medications if needed.    Your blood pressure is under good control    BP Readings from Last 4 Encounters:   09/21/18 118/72   07/31/18 114/64   06/28/18 125/75   09/18/17 126/70       2.  Aggressive LDL cholesterol (\"bad cholesterol\") lowering as indicated.    Your goal is an LDL under 130 for sure, preferably under 100.  (If you have diabetes or previous vascular disease, the the LDL goals would be under 100 for sure, preferably under 70.)    New guidelines identify four high-risk groups who could benefit from statins:   *people with pre-existing heart disease, such as those who have had a heart attack;   *people ages 40 to 75 who have diabetes of any type  *patients ages 40 to 75 with at least a 7.5% risk of developing cardiovascular disease over the next decade, according to a formula described in the guidelines  *patients with the sort of super-high cholesterol that sometimes runs in families, as evidenced by an LDL of 190 milligrams per deciliter or higher    Your cholesterol levels are well controlled.    Recent Labs   Lab Test  09/18/18   0805  09/06/17   0806   06/23/15   0803  06/25/14   0712   CHOL  136  140   < >  184  188   HDL  36*  45   < >  36*  39*   LDL  70  53   < >  89  " "105   TRIG  150*  208*   < >  295*  218*   CHOLHDLRATIO   --    --    --   5.1*  4.8    < > = values in this interval not displayed.       3.  Aggressive diabetic prevention, screening and/or management.      Your diabetes is well controlled.  Continue yor diet    4.  No smoking    5.  Consider taking low dose aspirin (81 mg) tablet once per day over the age of 50, every day unless there is a specific reason that you cannot take aspirin (such as side effect, allergy, or you are on another \"blood thinner\").        --Based on your current cardiac risk factors, you should take Aspirin 81 mg once per day if you are over 50 years of age.            --Good Grains:  Oats, brown rice, Quinoa (these do not raise the blood sugar as much)     --Bad grains:  Anything made from wheat or white rice     (because these raise the blood sugars significantly, and the possible gluten issue from wheat for some people).      --Proteins:  Aim for \"lean proteins\" including chicken, fish, seafood, pork, turkey, and eggs (in moderation); Eat red meat only occasionally          Dino Glasgow:                    "

## 2018-09-21 NOTE — PROGRESS NOTES
SUBJECTIVE:   Giacomo Solis is a 68 year old male who presents to clinic today for the following health issues:    Refused Imm's. Will get at pharmacy    Hypertension Follow-up/ Review recent results      Outpatient blood pressures are being checked at home.  Results are 110-120/70's.    Low Salt Diet: no added salt      Amount of exercise or physical activity: walking/chores    Problems taking medications regularly: No    Medication side effects: none    Diet: low salt          Problem list and histories reviewed & adjusted, as indicated.  Additional history: as documented        Reviewed and updated as needed this visit by clinical staff  Tobacco  Allergies  Meds  Med Hx  Fam Hx       Reviewed and updated as needed this visit by Provider           Past Medical History:  ---------------------------  Past Medical History:   Diagnosis Date     Atrial fibrillation (H)     paroxysmal     Basal cell carcinoma      Coronary artery disease involving native coronary artery of native heart 11/30/16    NSTEMI (peak troponin 0.05); coronary angiogram showed modeerate nonocclusive disease: LM 30-40 %, LAD 30-40%, CX 30-40 %     Diverticulosis of colon 4/16/12    sigmoid diverticulosis per CT scan; episode acute diverticulitis per CT scan     Heart valve insufficiency      Lipoma of other skin and subcutaneous tissue      Lumbar spinal stenosis 9/1/10    lumbar spinal stenosis at L5     NSTEMI (non-ST elevated myocardial infarction) (H) 11/30/16    NSTEMI (peak troponin 0.05); no acute lesions on angiogram     Obesity (BMI 30-39.9) 7/9/13    BMI 38     Obstructive sleep apnea 10/7/14    per sleep study:  AHI 25/hr, desats to 84%. Supine /hr, only 6 minutes spent supine     Other and unspecified hyperlipidemia      Prediabetes 7/9/13    elevated fasting glucoses     Shingles 11/2017    dx when he was in [place]     Tubular adenoma of colon 8/13/13    3 adenomatous poltyps removed, 4 hyperplastic polyps removed      Unspecified essential hypertension        Past Surgical History:  ---------------------------  Past Surgical History:   Procedure Laterality Date     COLONOSCOPY  8/13/13    3 adenomatous polyps and 4 hyperplastic polyps removed     HC TOOTH EXTRACTION W/FORCEP  1983    4 wisdom teeth removed withou difficulty       Current Medications:  ---------------------------  Current Outpatient Prescriptions   Medication Sig Dispense Refill     amLODIPine (NORVASC) 10 MG tablet Take 1 tablet (10 mg) by mouth daily 90 tablet 3     aspirin 81 MG tablet Take 1 tablet by mouth daily.       furosemide (LASIX) 20 MG tablet Take 2 tablets (40 mg) by mouth every morning 180 tablet 3     lisinopril (PRINIVIL/ZESTRIL) 40 MG tablet Take 1 tablet (40 mg) by mouth daily 90 tablet 3     metoprolol succinate (TOPROL-XL) 50 MG 24 hr tablet Take 1.5 tablets (75 mg) by mouth every morning 135 tablet 3     omeprazole (PRILOSEC) 20 MG CR capsule TAKE 1 CAPSULE(20 MG) BY MOUTH DAILY 90 capsule 2     rosuvastatin (CRESTOR) 40 MG tablet Take 1 tablet (40 mg) by mouth daily 90 tablet 3     fluorouracil (EFUDEX) 5 % cream Apply topically 2 times daily Twice daily for two weeks this fall to face (Patient not taking: Reported on 9/21/2018) 40 g 0       Allergies:  -------------  Allergies   Allergen Reactions     No Known Allergies        Social History:  -------------------  Social History     Social History     Marital status:      Spouse name: N/A     Number of children: N/A     Years of education: N/A     Occupational History     Not on file.     Social History Main Topics     Smoking status: Former Smoker     Packs/day: 0.75     Years: 20.00     Types: Cigarettes     Quit date: 5/11/2006     Smokeless tobacco: Never Used      Comment: had quit for 1 year, smoked on and off for years, quit again 5/06     Alcohol use 2.4 oz/week     0 Standard drinks or equivalent, 4 Glasses of wine per week      Comment: 2 glasses of wine daily      Drug  use: No     Sexual activity: No     Other Topics Concern     Special Diet No     Exercise No     Social History Narrative       Family Medical History:  ------------------------------  Family History   Problem Relation Age of Onset     Hypertension Mother      B:1920     Colon Cancer Mother 84     Kidney Cancer Mother 95      age 95     HEART DISEASE Father      D: 70's  MI     Hypertension Brother      lipids as well     HEART DISEASE Brother      CABG x 3, mitral valve reaplcement     Colon Cancer Brother 68     Respiratory Brother      PRACHI     Myocardial Infarction Brother 69     sudden cardiac death     Diabetes No family hx of      Cerebrovascular Disease No family hx of          ROS:  REVIEW OF SYSTEMS:    RESP: negative for cough, dyspnea, wheezing, hemoptysis  CV: negative for chest pain, palpitations, PND, JARAMILLO, orthopnea; reports no changes in their ability to perform physical activity (from cardiovascular standpoint)  GI: negative for dysphagia, N/V, pain, melena, diarrhea and constipation  NEURO: negative for numbness/tingling, paralysis, incoordination, or focal weakness     OBJECTIVE:                                                    /72  Pulse 64  Temp 98.4  F (36.9  C) (Oral)  Resp 14  Wt 257 lb 14.4 oz (117 kg)  SpO2 95%  BMI 38.09 kg/m2     GENERAL alert and no distress  EYES:  Normal sclera,conjunctiva, EOMI  HENT: oral and posterior pharynx without lesions or erythema, facies symmetric  NECK: Neck supple. No LAD, without thyroidmegaly or JVD., Carotids without bruits.  RESP: Clear to ausculation bilaterally without wheezes or crackles. Normal BS in all fields.  CV: RRR normal S1S2 without murmurs, rubs or gallops. PMI normal  LYMPH: no cervical lymph adenopathy appreciated  MS: extremities- no gross deformities of the visible extremities noted, no edema  PSYCH: Alert and oriented times 3; speech- coherent  SKIN:  No obvious significant skin lesions on visible portions of face           ASSESSMENT/PLAN:                                                      (I21.4) NSTEMI (non-ST elevated myocardial infarction) (H)  (primary encounter diagnosis)  Comment: Discussed secondary risk factor modification and recommended continuing aggressive management of these items.   Plan: metoprolol succinate (TOPROL-XL) 50 MG 24 hr         tablet, **CBC with platelets FUTURE 1yr,         **Basic metabolic panel FUTURE 1yr, Lipid panel        reflex to direct LDL Fasting          **states he will get vaccines at pharmacy    (I10) Essential hypertension  Comment: This condition is currently controlled on the current medical regimen.  Continue current therapy.   Discussed current hypertension treatment guidelines, including indications for treatment and treatment options.  Discussed the importance for aggressive management of HTN to prevent vascular complications later.  Recommended lower fat, lower carbohydrate, and lower sodium (<2000 mg)diet.  Discussed required intervals for follow up on HTN, lab studies.  Recommened pt. follow their blood pressures outside the clinic to ensure that BPs are remaining within guidelines, and to contact me if the readings are not within guidelines on a regular basis so we can adjust treatment as needed.   Plan: amLODIPine (NORVASC) 10 MG tablet, lisinopril         (PRINIVIL/ZESTRIL) 40 MG tablet, **CBC with         platelets FUTURE 1yr, **Basic metabolic panel         FUTURE 1yr, Lipid panel reflex to direct LDL         Fasting            (R60.0) Bilateral lower extremity edema  Comment: This condition is currently controlled on the current medical regimen.  Continue current therapy.   Plan: furosemide (LASIX) 20 MG tablet            (E78.5) Hyperlipidemia LDL goal <70  Comment: Discussed new guidelines recommending a statin cholesterol lowering medication for any patient with either diabetes and/or vascular disease, aiming for a LDL goal under 100 for sure, ideally under 70.     Reviewed statins and their side effects including muscle pain, muscle inflammation, GI upset.  Told the patient to stop the medication in question and to call if any side effects develop.   Recommended CoQ10 200-300 mg at the same time as taking the statin medication to help reduce the chance of muscle side effects from the statin.    Plan: rosuvastatin (CRESTOR) 40 MG tablet, **CBC with        platelets FUTURE 1yr, **Basic metabolic panel         FUTURE 1yr, Lipid panel reflex to direct LDL         Fasting            (Z12.5) Special screening for malignant neoplasm of prostate  Comment:   Plan: **Prostate spec antigen screen FUTURE 1yr              See Patient Instructions    BA DAI M.D., MD  [facility]

## 2019-01-07 DIAGNOSIS — K21.9 GASTROESOPHAGEAL REFLUX DISEASE WITHOUT ESOPHAGITIS: ICD-10-CM

## 2019-01-07 NOTE — TELEPHONE ENCOUNTER
"Requested Prescriptions   Pending Prescriptions Disp Refills     omeprazole (PRILOSEC) 20 MG DR capsule [Pharmacy Med Name: OMEPRAZOLE 20MG CAPSULES]  Last Written Prescription Date:  01/02/2018  Last Fill Quantity: 90,  # refills: 02   Last Office Visit: 9/21/2018   Future Office Visit:      90 capsule 0     Sig: TAKE 1 CAPSULE(20 MG) BY MOUTH DAILY    PPI Protocol Passed - 1/7/2019  3:12 AM       Passed - Not on Clopidogrel (unless Pantoprazole ordered)       Passed - No diagnosis of osteoporosis on record       Passed - Recent (12 mo) or future (30 days) visit within the authorizing provider's specialty    Patient had office visit in the last 12 months or has a visit in the next 30 days with authorizing provider or within the authorizing provider's specialty.  See \"Patient Info\" tab in inbasket, or \"Choose Columns\" in Meds & Orders section of the refill encounter.             Passed - Medication is active on med list       Passed - Patient is age 18 or older          "

## 2019-03-15 DIAGNOSIS — E78.5 HYPERLIPIDEMIA LDL GOAL <70: ICD-10-CM

## 2019-03-15 RX ORDER — ROSUVASTATIN CALCIUM 40 MG/1
40 TABLET, COATED ORAL DAILY
Qty: 90 TABLET | Refills: 1 | Status: SHIPPED | OUTPATIENT
Start: 2019-03-15 | End: 2019-09-26

## 2019-08-06 ENCOUNTER — OFFICE VISIT (OUTPATIENT)
Dept: INTERNAL MEDICINE | Facility: CLINIC | Age: 69
End: 2019-08-06
Payer: MEDICARE

## 2019-08-06 ENCOUNTER — ANCILLARY PROCEDURE (OUTPATIENT)
Dept: GENERAL RADIOLOGY | Facility: CLINIC | Age: 69
End: 2019-08-06
Attending: INTERNAL MEDICINE
Payer: MEDICARE

## 2019-08-06 VITALS
OXYGEN SATURATION: 95 % | BODY MASS INDEX: 40.17 KG/M2 | TEMPERATURE: 98.2 F | SYSTOLIC BLOOD PRESSURE: 118 MMHG | RESPIRATION RATE: 14 BRPM | DIASTOLIC BLOOD PRESSURE: 70 MMHG | HEART RATE: 64 BPM | WEIGHT: 272 LBS

## 2019-08-06 DIAGNOSIS — R05.9 COUGH: ICD-10-CM

## 2019-08-06 DIAGNOSIS — R06.2 WHEEZING: ICD-10-CM

## 2019-08-06 DIAGNOSIS — R73.03 PREDIABETES: ICD-10-CM

## 2019-08-06 DIAGNOSIS — J44.9 CHRONIC OBSTRUCTIVE PULMONARY DISEASE, UNSPECIFIED COPD TYPE (H): Primary | ICD-10-CM

## 2019-08-06 LAB
FEF 25/75: NORMAL
FEF 25/75: NORMAL
FEV-1: NORMAL
FEV-1: NORMAL
FEV1/FVC: NORMAL
FEV1/FVC: NORMAL
FVC: NORMAL
FVC: NORMAL

## 2019-08-06 PROCEDURE — 94010 BREATHING CAPACITY TEST: CPT | Performed by: INTERNAL MEDICINE

## 2019-08-06 PROCEDURE — 94640 AIRWAY INHALATION TREATMENT: CPT | Mod: 59 | Performed by: INTERNAL MEDICINE

## 2019-08-06 PROCEDURE — 71046 X-RAY EXAM CHEST 2 VIEWS: CPT

## 2019-08-06 PROCEDURE — 99214 OFFICE O/P EST MOD 30 MIN: CPT | Mod: 25 | Performed by: INTERNAL MEDICINE

## 2019-08-06 RX ORDER — ALBUTEROL SULFATE 90 UG/1
2 AEROSOL, METERED RESPIRATORY (INHALATION) EVERY 4 HOURS PRN
Qty: 1 INHALER | Refills: 11 | Status: SHIPPED | OUTPATIENT
Start: 2019-08-06 | End: 2020-07-31

## 2019-08-06 RX ORDER — ALBUTEROL SULFATE 90 UG/1
2 AEROSOL, METERED RESPIRATORY (INHALATION) EVERY 4 HOURS PRN
Qty: 1 INHALER | Refills: 11 | Status: SHIPPED | OUTPATIENT
Start: 2019-08-06 | End: 2019-08-06

## 2019-08-06 NOTE — PATIENT INSTRUCTIONS
"*  Return for a \"lab only appointment\" before your cardiology appoitnemnt, sooner if needed.  Please get these labs done in a fasting state with nothing to eat for at least 8 hours prior to getting labs drawn.  I will make contact regarding the results and any recommendations once I have reviewed them.     *  Return to see me in 1-2 month regardless of how you feel, return sooner if needed.  Use [employer] or Call [phone] to schedule the appointment with me.        *  Probable mild COPD from prior smoking, worse with the large dust exposure    *  Inflammation inside the airways causing shortness of breath and coughing    *  Use the Albuterol inhaler as needed for any coughing, wheezing, tightness in the breathing, or shortness of breath.   Consider using it before any known triggers for our coughing or wheezing (usually these include cold or hot temperatures, humidity, dust, air pollutants, smoke).  Expect that your airways may remain more easily irritated for a few weeks after upper respiratory infections.     If you require the albuterol rescue inhaler on a regular basis more than a few times per week, you may need additional medications to help control the breathing better.    These are the available forms of Albuterol, your insurance formulary will determine which one is preferred.  They all work the same.            AND     ONE OF THE FOLLOWING CONTOLLER/PREVENTATIVE INHALERS EVERY DAY:      ADVAIR:    1 inhalation twice per day, every day to help control and prevent the inflammation in the airways.  Advair is not a \"rescue inhaler\", you should not use this inhaler for urgent breathing problem, use the Albuterol inhaler.        AIR DUO INHALER:    1 inhalation twice per day, every day to help control and prevent the inflammation in the airways.  AirDuo is not a \"rescue inhaler\", you should not use this inhaler for urgent breathing problem, use the Albuterol inhaler.      OR      WIXELA       OR    DULERA " "INHALER:  2 puffs twice per day, every day  Regardless of how your breathing is doing to help control and prevent the inflammation in the airways.  Advair is not a \"rescue inhaler\", you should not use this inhaler for urgent breathing problem, use the Albuterol inhaler.    OR    *  Symbicort:  2 puffs twice per day, every day to help control and prevent the inflammation in the airways.  This is not a \"rescue inhaler\", you should not use this inhaler for urgent breathing problem, use the Albuterol inhaler.           "

## 2019-08-06 NOTE — PROGRESS NOTES
Subjective     Giacomo Solis is a 69 year old male who presents to clinic today for the following health issues:    HPI     Pt here to discuss wheezing that has been going on for about 1 year and not getting better.   Slight cough but wheezing happens when trying to lay down or do any activities.   Also swelling of both leg no pain or other Sx      Started after 4 wheeling in [place] in the desert last summer  Inhaled a lot of dust.   Since that time regular wheezing and coughing with exertion and laying down.    Has had to sit up in chair for it to go away.   Also notices this with exertion.   Has never had this before.   No fevers, no chills, no night sweats.   Cough has been mostly non-productive.     No GERD sx.     Wt Readings from Last 10 Encounters:   08/06/19 123.4 kg (272 lb)   09/21/18 117 kg (257 lb 14.4 oz)   07/31/18 116.2 kg (256 lb 3.2 oz)   06/28/18 115.7 kg (255 lb)   09/18/17 117.5 kg (259 lb 1.6 oz)   03/23/17 118.4 kg (261 lb 1.6 oz)   12/16/16 118.1 kg (260 lb 4.8 oz)   12/12/16 115.5 kg (254 lb 11.2 oz)   12/02/16 117.7 kg (259 lb 7.7 oz)   11/13/16 117.5 kg (259 lb)             Reviewed and updated as needed this visit by Provider         Review of Systems         Objective    /70   Pulse 64   Temp 98.2  F (36.8  C) (Temporal)   Resp 14   Wt 123.4 kg (272 lb)   SpO2 95%   BMI 40.17 kg/m    Body mass index is 40.17 kg/m .  Physical Exam                 Past Medical History:  ---------------------------  Past Medical History:   Diagnosis Date     Atrial fibrillation (H)     paroxysmal     Basal cell carcinoma      Coronary artery disease involving native coronary artery of native heart 11/30/16    NSTEMI (peak troponin 0.05); coronary angiogram showed modeerate nonocclusive disease: LM 30-40 %, LAD 30-40%, CX 30-40 %     Diverticulosis of colon 4/16/12    sigmoid diverticulosis per CT scan; episode acute diverticulitis per CT scan     Heart valve insufficiency      Lipoma of other  skin and subcutaneous tissue      Lumbar spinal stenosis 9/1/10    lumbar spinal stenosis at L5     NSTEMI (non-ST elevated myocardial infarction) (H) 11/30/16    NSTEMI (peak troponin 0.05); no acute lesions on angiogram     Obesity (BMI 30-39.9) 7/9/13    BMI 38     Obstructive sleep apnea 10/7/14    per sleep study:  AHI 25/hr, desats to 84%. Supine /hr, only 6 minutes spent supine     Other and unspecified hyperlipidemia      Prediabetes 7/9/13    elevated fasting glucoses     Shingles 11/2017    dx when he was in [place]     Tubular adenoma of colon 8/13/13    3 adenomatous poltyps removed, 4 hyperplastic polyps removed     Unspecified essential hypertension        Past Surgical History:  ---------------------------  Past Surgical History:   Procedure Laterality Date     COLONOSCOPY  8/13/13    3 adenomatous polyps and 4 hyperplastic polyps removed     HC TOOTH EXTRACTION W/FORCEP  1983    4 wisdom teeth removed withou difficulty       Current Medications:  ---------------------------  Current Outpatient Medications   Medication Sig Dispense Refill     amLODIPine (NORVASC) 10 MG tablet Take 1 tablet (10 mg) by mouth daily 90 tablet 3     aspirin 81 MG tablet Take 1 tablet by mouth daily.       furosemide (LASIX) 20 MG tablet Take 2 tablets (40 mg) by mouth every morning 180 tablet 3     lisinopril (PRINIVIL/ZESTRIL) 40 MG tablet Take 1 tablet (40 mg) by mouth daily 90 tablet 3     metoprolol succinate (TOPROL-XL) 50 MG 24 hr tablet Take 1.5 tablets (75 mg) by mouth every morning 135 tablet 3     omeprazole (PRILOSEC) 20 MG DR capsule TAKE 1 CAPSULE(20 MG) BY MOUTH DAILY 90 capsule 2     rosuvastatin (CRESTOR) 40 MG tablet Take 1 tablet (40 mg) by mouth daily 90 tablet 1       Allergies:  -------------  Allergies   Allergen Reactions     No Known Allergies        Social History:  -------------------  Social History     Socioeconomic History     Marital status:      Spouse name: Not on file      Number of children: Not on file     Years of education: Not on file     Highest education level: Not on file   Occupational History     Not on file   Social Needs     Financial resource strain: Not on file     Food insecurity:     Worry: Not on file     Inability: Not on file     Transportation needs:     Medical: Not on file     Non-medical: Not on file   Tobacco Use     Smoking status: Former Smoker     Packs/day: 0.75     Years: 20.00     Pack years: 15.00     Types: Cigarettes     Last attempt to quit: 2006     Years since quittin.2     Smokeless tobacco: Never Used     Tobacco comment: had quit for 1 year, smoked on and off for years, quit again    Substance and Sexual Activity     Alcohol use: Yes     Alcohol/week: 2.4 oz     Types: 4 Glasses of wine per week     Comment: 2 glasses of wine daily      Drug use: No     Sexual activity: Never   Lifestyle     Physical activity:     Days per week: Not on file     Minutes per session: Not on file     Stress: Not on file   Relationships     Social connections:     Talks on phone: Not on file     Gets together: Not on file     Attends Christian service: Not on file     Active member of club or organization: Not on file     Attends meetings of clubs or organizations: Not on file     Relationship status: Not on file     Intimate partner violence:     Fear of current or ex partner: Not on file     Emotionally abused: Not on file     Physically abused: Not on file     Forced sexual activity: Not on file   Other Topics Concern     Parent/sibling w/ CABG, MI or angioplasty before 65F 55M? Not Asked      Service Not Asked     Blood Transfusions Not Asked     Caffeine Concern Not Asked     Occupational Exposure Not Asked     Hobby Hazards Not Asked     Sleep Concern Not Asked     Stress Concern Not Asked     Weight Concern Not Asked     Special Diet No     Back Care Not Asked     Exercise No     Bike Helmet Not Asked     Seat Belt Not Asked      Self-Exams Not Asked   Social History Narrative     Not on file       Family Medical History:  ------------------------------  Family History   Problem Relation Age of Onset     Hypertension Mother         B:1920     Colon Cancer Mother 84     Kidney Cancer Mother 95         age 95     Heart Disease Father         D: 70's  MI     Hypertension Brother         lipids as well     Heart Disease Brother         CABG x 3, mitral valve reaplcement     Colon Cancer Brother 68     Respiratory Brother         PRACHI     Myocardial Infarction Brother 69        sudden cardiac death     Diabetes No family hx of      Cerebrovascular Disease No family hx of          ROS:  REVIEW OF SYSTEMS:    RESP: NEG for hemoptysis; positive for cough, dyspnea, wheezing; negative for hemoptysis   CV: negative for chest pain, palpitations, PND, orthopnea;   GI: negative for dysphagia, N/V, pain, melena, diarrhea and constipation  NEURO: negative for new numbness/tingling, paralysis, incoordination, or focal weakness     OBJECTIVE:                                                    /70   Pulse 64   Temp 98.2  F (36.8  C) (Temporal)   Resp 14   Wt 123.4 kg (272 lb)   SpO2 95%   BMI 40.17 kg/m       GENERAL alert and no distress  EYES:  Normal sclera,conjunctiva, EOMI  HENT: oral and posterior pharynx without lesions or erythema, facies symmetric  NECK: Neck supple. No LAD, without thyroidmegaly.  RESP: diffuse end expiratory wheezes in all lung fields. .  CV: RRR normal S1S2 with 2/6 systolic murmur in upper sternal borders, no rubs or gallops.  LYMPH: no cervical lymph adenopathy appreciated  MS: extremities- no gross deformities of the visible extremities noted,   EXT:  no lower extremity edema  PSYCH: Alert and oriented times 3; speech- coherent  SKIN:  No obvious significant skin lesions on visible portions of face     **Patient received an albuterol nebulizer treatment.  This resulted in significant decrease in his wheezing upon  repeat examination.  He stated that he noticed a difference when walking through the waiting room a second time after the chest x-ray with improved air movement and less shortness of breath.    CXR (my prelim read):  No infiltrates, no effusions, no masses.     Spirometry:  FEV1:  1.7 (53% PRED)     ASSESSMENT/PLAN:                                                      (J44.9) Chronic obstructive pulmonary disease, unspecified COPD type (H)  (primary encounter diagnosis)  Comment: new onset COPD.   Discussed general issues of COPD, including pathophysiology, ways it will affect the pt., when to seek help, reviewed the typical medications (how they are taken, how they help)   Had nice decreae in wheezing and improved shortness of breath with the albuterol neb treatment in the office.   Use albuterol MDI as needed, discussed resuce inhaler concept.   Needs daily preventative type medication.   Will try and get him Advair, Dulera, AirDuo, Symbicort, whichever is cheapest for him.  Will need to try RXing a few because he does not have good insurnace and wants the cheapest one.   Return in 1-2 months regardless of how he feels.   Consider adding Spiriva or incruse if needed.   Told him to let me know about any problems with the medications, alos reviewed possibly getting them from [facility] if needed.   Plan: albuterol (PROAIR HFA/PROVENTIL HFA/VENTOLIN         HFA) 108 (90 Base) MCG/ACT inhaler,         DISCONTINUED: albuterol (PROAIR HFA/PROVENTIL         HFA/VENTOLIN HFA) 108 (90 Base) MCG/ACT         inhaler, DISCONTINUED: fluticasone-salmeterol         (ADVAIR) 250-50 MCG/DOSE inhaler, DISCONTINUED:        fluticasone-salmeterol (ADVAIR) 250-50 MCG/DOSE        inhaler            (R05) Cough  Comment: as above, new diagnosis of COPD  Plan: Spirometry, Breathing Capacity: Normal Order,         Clinic Performed, XR Chest 2 Views, Spirometry,        Breathing Capacity: Normal Order, Clinic         Performed         "    (R06.2) Wheezing  Comment: COPD, as above  Plan: Spirometry, Breathing Capacity: Normal Order,         Clinic Performed, XR Chest 2 Views, Spirometry,        Breathing Capacity: Normal Order, Clinic         Performed            (R73.03) Prediabetes  Comment: Reviewed the labs showing elevated glucose levels.    Discussed \"pre-diabetes\", impaired glucose tolerance, and its part in the dysmetabolic syndrome.    Discussed the inevitable progression of impaired glucose tolerance toward worsening diabetes mellitus and the need for agressive interventions now to delay and prevent this inevitable progression.  Discussed the overall risks that dysmetabolic syndromes/impaired glucose tolerance/syndrome X pose toward increased risks of vascular disease as the main reason for agressive intervention now.  Will add medications for glucose control (i.e. metformin, glitazones, etc), lipid (e.g. statins), and for blood pressure (preferably ARBs and ACE) as indicated.  Will start these as early as needed based other proven ability to delay and modify these risk factors.   Discussed the need for aggressive diet control as the cornerstone of pre-diabetes and diabetes management, emphasizing the reduction of \"simple carbohydrates\" (e.g. Any kind of wheat products (e.g. any bread, any pasta), white rice, noodles, potatoes, snack foods, regular soda, juices (except fresh squeezed), cakes, cookies, candy, etc.) along with regular exercise.       Plan: Hemoglobin A1c             See Patient Instructions    BA DAI M.D., MD  [facility]    (Chart documentation may have been completed, in part, with [employer] voice-recognition software. Even though reviewed, some grammatical, spelling, and word errors may remain.)       "

## 2019-08-07 ENCOUNTER — MYC MEDICAL ADVICE (OUTPATIENT)
Dept: INTERNAL MEDICINE | Facility: CLINIC | Age: 69
End: 2019-08-07

## 2019-08-08 ENCOUNTER — TELEPHONE (OUTPATIENT)
Dept: INTERNAL MEDICINE | Facility: CLINIC | Age: 69
End: 2019-08-08

## 2019-08-08 DIAGNOSIS — E78.5 HYPERLIPIDEMIA LDL GOAL <70: ICD-10-CM

## 2019-08-08 DIAGNOSIS — J44.9 CHRONIC OBSTRUCTIVE PULMONARY DISEASE, UNSPECIFIED COPD TYPE (H): Primary | ICD-10-CM

## 2019-08-08 DIAGNOSIS — Z12.5 SPECIAL SCREENING FOR MALIGNANT NEOPLASM OF PROSTATE: ICD-10-CM

## 2019-08-08 DIAGNOSIS — I10 ESSENTIAL HYPERTENSION: ICD-10-CM

## 2019-08-08 DIAGNOSIS — I21.4 NSTEMI (NON-ST ELEVATED MYOCARDIAL INFARCTION) (H): ICD-10-CM

## 2019-08-08 DIAGNOSIS — R73.03 PREDIABETES: ICD-10-CM

## 2019-08-08 LAB
ANION GAP SERPL CALCULATED.3IONS-SCNC: 6 MMOL/L (ref 3–14)
BUN SERPL-MCNC: 15 MG/DL (ref 7–30)
CALCIUM SERPL-MCNC: 8.3 MG/DL (ref 8.5–10.1)
CHLORIDE SERPL-SCNC: 109 MMOL/L (ref 94–109)
CHOLEST SERPL-MCNC: 125 MG/DL
CO2 SERPL-SCNC: 26 MMOL/L (ref 20–32)
CREAT SERPL-MCNC: 0.73 MG/DL (ref 0.66–1.25)
ERYTHROCYTE [DISTWIDTH] IN BLOOD BY AUTOMATED COUNT: 14.4 % (ref 10–15)
GFR SERPL CREATININE-BSD FRML MDRD: >90 ML/MIN/{1.73_M2}
GLUCOSE SERPL-MCNC: 114 MG/DL (ref 70–99)
HBA1C MFR BLD: 6.3 % (ref 0–5.6)
HCT VFR BLD AUTO: 43.7 % (ref 40–53)
HDLC SERPL-MCNC: 34 MG/DL
HGB BLD-MCNC: 14.3 G/DL (ref 13.3–17.7)
LDLC SERPL CALC-MCNC: 65 MG/DL
MCH RBC QN AUTO: 28.1 PG (ref 26.5–33)
MCHC RBC AUTO-ENTMCNC: 32.7 G/DL (ref 31.5–36.5)
MCV RBC AUTO: 86 FL (ref 78–100)
NONHDLC SERPL-MCNC: 91 MG/DL
PLATELET # BLD AUTO: 211 10E9/L (ref 150–450)
POTASSIUM SERPL-SCNC: 3.8 MMOL/L (ref 3.4–5.3)
PSA SERPL-ACNC: 0.71 UG/L (ref 0–4)
RBC # BLD AUTO: 5.09 10E12/L (ref 4.4–5.9)
SODIUM SERPL-SCNC: 141 MMOL/L (ref 133–144)
TRIGL SERPL-MCNC: 130 MG/DL
WBC # BLD AUTO: 6.1 10E9/L (ref 4–11)

## 2019-08-08 PROCEDURE — 36415 COLL VENOUS BLD VENIPUNCTURE: CPT | Performed by: INTERNAL MEDICINE

## 2019-08-08 PROCEDURE — 83036 HEMOGLOBIN GLYCOSYLATED A1C: CPT | Performed by: INTERNAL MEDICINE

## 2019-08-08 PROCEDURE — 80061 LIPID PANEL: CPT | Performed by: INTERNAL MEDICINE

## 2019-08-08 PROCEDURE — 80048 BASIC METABOLIC PNL TOTAL CA: CPT | Performed by: INTERNAL MEDICINE

## 2019-08-08 PROCEDURE — G0103 PSA SCREENING: HCPCS | Performed by: INTERNAL MEDICINE

## 2019-08-08 PROCEDURE — 85027 COMPLETE CBC AUTOMATED: CPT | Performed by: INTERNAL MEDICINE

## 2019-08-08 NOTE — TELEPHONE ENCOUNTER
Reason for Call:  Medication or medication refill:    Do you use a [facility]?  Name of the pharmacy and phone number for the current request:  [facility] tele # [phone]    Name of the medication requested: Flutic/SALMETER 250/50mcg AER clarification     Other request: can you please send a script for the air duo more cost effective 2nd request pt is out    Can we leave a detailed message on this number?     Phone number patient can be reached at: Other phone number:  See above tele #    Best Time: asap    Call taken on 8/8/2019 at 2:29 PM by Flavia Stephenson

## 2019-08-08 NOTE — TELEPHONE ENCOUNTER
PCP please see note below regarding cheaper alternative for patient.       Medication Td'up recommended review, edit/add, and sign if appropriate.    FYI dose is 232-14    Coty MUJICA RN, BSN, PHN

## 2019-08-09 RX ORDER — FLUTICASONE PROPIONATE AND SALMETEROL 232; 14 UG/1; UG/1
1 POWDER, METERED RESPIRATORY (INHALATION) 2 TIMES DAILY
Qty: 1 INHALER | Refills: 11 | Status: SHIPPED | OUTPATIENT
Start: 2019-08-09 | End: 2020-07-31

## 2019-08-27 ENCOUNTER — HOSPITAL ENCOUNTER (OUTPATIENT)
Dept: CARDIOLOGY | Facility: CLINIC | Age: 69
Discharge: HOME | End: 2019-08-27
Attending: INTERNAL MEDICINE | Admitting: INTERNAL MEDICINE
Payer: MEDICARE

## 2019-08-27 DIAGNOSIS — R07.9 CHEST PAIN, UNSPECIFIED TYPE: ICD-10-CM

## 2019-08-27 DIAGNOSIS — E78.5 HYPERLIPIDEMIA LDL GOAL <70: ICD-10-CM

## 2019-08-27 DIAGNOSIS — I35.0 NONRHEUMATIC AORTIC VALVE STENOSIS: ICD-10-CM

## 2019-08-27 PROCEDURE — 93306 TTE W/DOPPLER COMPLETE: CPT

## 2019-08-27 PROCEDURE — 93306 TTE W/DOPPLER COMPLETE: CPT | Mod: 26 | Performed by: INTERNAL MEDICINE

## 2019-08-29 ENCOUNTER — OFFICE VISIT (OUTPATIENT)
Dept: CARDIOLOGY | Facility: CLINIC | Age: 69
End: 2019-08-29
Payer: MEDICARE

## 2019-08-29 VITALS
BODY MASS INDEX: 40.29 KG/M2 | SYSTOLIC BLOOD PRESSURE: 124 MMHG | DIASTOLIC BLOOD PRESSURE: 75 MMHG | WEIGHT: 272 LBS | HEART RATE: 66 BPM | HEIGHT: 69 IN

## 2019-08-29 DIAGNOSIS — I35.0 AORTIC VALVE STENOSIS, ETIOLOGY OF CARDIAC VALVE DISEASE UNSPECIFIED: ICD-10-CM

## 2019-08-29 DIAGNOSIS — E78.5 HYPERLIPIDEMIA LDL GOAL <70: ICD-10-CM

## 2019-08-29 DIAGNOSIS — G47.33 OSA (OBSTRUCTIVE SLEEP APNEA): Primary | ICD-10-CM

## 2019-08-29 PROCEDURE — 99213 OFFICE O/P EST LOW 20 MIN: CPT | Performed by: INTERNAL MEDICINE

## 2019-08-29 ASSESSMENT — MIFFLIN-ST. JEOR: SCORE: 1999.16

## 2019-08-29 NOTE — PROGRESS NOTES
Service Date: 08/29/2019      HISTORY OF PRESENT ILLNESS:  Mr. Solis is a very pleasant, 69-year-old gentleman whom I met in 11/2016 when he presented with an NSTEMI.  He was found to have moderate aortic valve stenosis with a mean gradient that has been between 18-22 and moderate disease in his coronary arteries with small vessels distally.  He has been managed medically and doing really well.  I last saw him in 06/2018.      Since I last saw him, he has unfortunately gained a bit of weight.  His BMI is up to 40 with his 15 pound weight gain, which his wife attributes to dietary indiscretion and lack of exercise.  He does not have any symptoms of claudication, but he does have some joint pain, but he recently went to the [facility] and was able to walk through the fair without any issues, again without any cardiac symptoms.  His blood pressure is under excellent control.  His cholesterol is also well controlled.  He has a history of paroxysmal atrial fibrillation, but did have a GI bleed on Xarelto, and so it was recommended he not restart that.  He is in sinus rhythm today.  His brother since I last saw him did have his aortic valve replaced, and it may be bicuspid.  On Mr. Solis's valve, it is not clear whether it is bicuspid because of the degree of calcification, but given his brother's disease, it likely is the case.  His ascending aorta has been between 3.8-4.1.      Mr. Solis does have moderate obstructive sleep apnea on a sleep study in 2014 here at [place].  He apparently had another sleep study in [place] this year that also has recommended a CPAP mask.  He still has not done that.  He is willing to be fitted for that, however, and so he is going to see a sleep medicine doctor here.  Last stress test was in 07/2018 with a stress echo, and it was negative for ischemia.      IMPRESSION, REPORT, PLAN:   1.  Moderate coronary artery disease, managed medically with last cath 12/2016, currently  asymptomatic.   2.  Hypertension, well controlled.   3.  History of GI bleed on Xarelto, currently not anticoagulated as a result.   4.  Paroxysmal atrial fibrillation.   5.  Hyperlipidemia, well controlled.   6.  Obesity.   7.  Moderate aortic valve stenosis with a peak gradient of 22.     8.  Ascending aortic dilatation at 4 cm.   9.  Obstructive sleep apnea.      DISCUSSION:  It was a pleasure seeing Mr. Solis in followup.  He is doing well from a cardiovascular standpoint and does not have any concerning symptoms.  It sounds like his brother recently had his aortic valve replaced, which would indicate that Mr. Solis's valve likely is bicuspid.  Regardless, it is just remaining relatively stable with a mean gradient of 22.  We can continue to follow.  He is asymptomatic.  We discussed in detail diet and exercise.  He knows he needs to work on that, and we gave him strategies to help in that regard.  We discussed getting fitted for a CPAP mask, and trying to use that will help overall health, which he understands.  Otherwise, we will plan to see him back in a year with an echo or sooner if there are any issues in the interim.        It was a pleasure to see him today.  Please do not hesitate to contact me with any questions or concerns.         SADI THIBODEAUX MD             D: 2019   T: 2019   MT: ginette      Name:     TISHA SOLIS   MRN:      [id number]        Account:      [id number]   :      1950           Service Date: 2019      Document: [id number]

## 2019-08-29 NOTE — PROGRESS NOTES
CARDIOLOGY CONSULTATION:    Please see dictated note    PAST MEDICAL HISTORY:  Past Medical History:   Diagnosis Date     Atrial fibrillation (H)     paroxysmal     Basal cell carcinoma      COPD (chronic obstructive pulmonary disease) (H) 08/06/2019    FEV1 53 %     Coronary artery disease involving native coronary artery of native heart 11/30/16    NSTEMI (peak troponin 0.05); coronary angiogram showed modeerate nonocclusive disease: LM 30-40 %, LAD 30-40%, CX 30-40 %     Diverticulosis of colon 4/16/12    sigmoid diverticulosis per CT scan; episode acute diverticulitis per CT scan     Heart valve insufficiency      Lipoma of other skin and subcutaneous tissue      Lumbar spinal stenosis 9/1/10    lumbar spinal stenosis at L5     NSTEMI (non-ST elevated myocardial infarction) (H) 11/30/16    NSTEMI (peak troponin 0.05); no acute lesions on angiogram     Obesity (BMI 30-39.9) 7/9/13    BMI 38     Obstructive sleep apnea 10/7/14    per sleep study:  AHI 25/hr, desats to 84%. Supine /hr, only 6 minutes spent supine     Other and unspecified hyperlipidemia      Prediabetes 7/9/13    elevated fasting glucoses     Shingles 11/2017    dx when he was in [place]     Tubular adenoma of colon 8/13/13    3 adenomatous poltyps removed, 4 hyperplastic polyps removed     Unspecified essential hypertension        CURRENT MEDICATIONS:  Current Outpatient Medications   Medication Sig Dispense Refill     albuterol (PROAIR HFA/PROVENTIL HFA/VENTOLIN HFA) 108 (90 Base) MCG/ACT inhaler Inhale 2 puffs into the lungs every 4 hours as needed for shortness of breath / dyspnea or wheezing 1 Inhaler 11     amLODIPine (NORVASC) 10 MG tablet Take 1 tablet (10 mg) by mouth daily 90 tablet 3     aspirin 81 MG tablet Take 1 tablet by mouth daily.       fluticasone-salmeterol (AIRDUO RESPICLICK) 232-14 MCG/ACT inhaler Inhale 1 puff into the lungs 2 times daily 1 Inhaler 11     furosemide (LASIX) 20 MG tablet Take 2 tablets (40 mg) by mouth  every morning 180 tablet 3     lisinopril (PRINIVIL/ZESTRIL) 40 MG tablet Take 1 tablet (40 mg) by mouth daily 90 tablet 3     metoprolol succinate (TOPROL-XL) 50 MG 24 hr tablet Take 1.5 tablets (75 mg) by mouth every morning 135 tablet 3     omeprazole (PRILOSEC) 20 MG DR capsule TAKE 1 CAPSULE(20 MG) BY MOUTH DAILY (Patient taking differently: TAKE 1 CAPSULE(20 MG) BY EVERY OTHER DAY) 90 capsule 2     rosuvastatin (CRESTOR) 40 MG tablet Take 1 tablet (40 mg) by mouth daily 90 tablet 1       PAST SURGICAL HISTORY:  Past Surgical History:   Procedure Laterality Date     COLONOSCOPY  13    3 adenomatous polyps and 4 hyperplastic polyps removed     HC TOOTH EXTRACTION W/FORCEP      4 wisdom teeth removed withou difficulty       ALLERGIES  No known allergies    FAMILY HX:  Family History   Problem Relation Age of Onset     Hypertension Mother         B:192     Colon Cancer Mother 84     Kidney Cancer Mother 95         age 95     Heart Disease Father         D: 70's  MI     Hypertension Brother         lipids as well     Heart Disease Brother         CABG x 3, mitral valve reaplcement     Colon Cancer Brother 68     Respiratory Brother         PRACHI     Myocardial Infarction Brother 69        sudden cardiac death     Diabetes No family hx of      Cerebrovascular Disease No family hx of        SOCIAL HX:  Social History     Socioeconomic History     Marital status:      Spouse name: None     Number of children: None     Years of education: None     Highest education level: None   Occupational History     None   Social Needs     Financial resource strain: None     Food insecurity:     Worry: None     Inability: None     Transportation needs:     Medical: None     Non-medical: None   Tobacco Use     Smoking status: Former Smoker     Packs/day: 0.75     Years: 20.00     Pack years: 15.00     Types: Cigarettes     Last attempt to quit: 2006     Years since quittin.3     Smokeless tobacco: Never  "Used     Tobacco comment: had quit for 1 year, smoked on and off for years, quit again 5/06   Substance and Sexual Activity     Alcohol use: Yes     Alcohol/week: 2.4 oz     Types: 4 Glasses of wine per week     Comment: 2 glasses of wine daily      Drug use: No     Sexual activity: Never   Lifestyle     Physical activity:     Days per week: None     Minutes per session: None     Stress: None   Relationships     Social connections:     Talks on phone: None     Gets together: None     Attends Mandaeism service: None     Active member of club or organization: None     Attends meetings of clubs or organizations: None     Relationship status: None     Intimate partner violence:     Fear of current or ex partner: None     Emotionally abused: None     Physically abused: None     Forced sexual activity: None   Other Topics Concern     Parent/sibling w/ CABG, MI or angioplasty before 65F 55M? Not Asked      Service Not Asked     Blood Transfusions Not Asked     Caffeine Concern Not Asked     Occupational Exposure Not Asked     Hobby Hazards Not Asked     Sleep Concern Not Asked     Stress Concern Not Asked     Weight Concern Not Asked     Special Diet No     Back Care Not Asked     Exercise No     Bike Helmet Not Asked     Seat Belt Not Asked     Self-Exams Not Asked   Social History Narrative     None       ROS:  Constitutional: No fever, chills, or sweats. No weight gain/loss.   ENT: No visual disturbance, ear ache, epistaxis, sore throat.   Allergies/Immunologic: Negative.   Respiratory: No cough, hemoptysis.   Cardiovascular: As per HPI.   GI: No nausea, vomiting, hematemesis, melena, or hematochezia.   : No urinary frequency, dysuria, or hematuria.   Integument: Negative.   Psychiatric: Negative.   Neuro: Negative.   Endocrinology: Negative.   Musculoskeletal: No myalgia.    VITAL SIGNS:  /75   Pulse 66   Ht 1.753 m (5' 9\")   Wt 123.4 kg (272 lb)   BMI 40.17 kg/m    Body mass index is 40.17 " kg/m .  Wt Readings from Last 2 Encounters:   08/29/19 123.4 kg (272 lb)   08/06/19 123.4 kg (272 lb)       PHYSICAL EXAM  Giacomo Solis IS A 69 year old male.in no acute distress.  HEENT: Unremarkable.  Neck: JVP normal.  Carotids +4/4 bilaterally without bruits.  Lungs: CTA.  Cor: RRR. Normal S1 and S2.  2/ mid peaking systolic murmur.  PMI in Lf 5th ICS.  Abd: Soft, nontender, nondistended.  NABS.  No pulsatile mass.  Extremities: No C/C/E.  Pulses +4/4 symmetric in upper and lower extremities.  Neuro: Grossly intact.    LABS    Lab Results   Component Value Date    WBC 6.1 08/08/2019     Lab Results   Component Value Date    RBC 5.09 08/08/2019     Lab Results   Component Value Date    HGB 14.3 08/08/2019     Lab Results   Component Value Date    HCT 43.7 08/08/2019     No components found for: MCT  Lab Results   Component Value Date    MCV 86 08/08/2019     Lab Results   Component Value Date    MCH 28.1 08/08/2019     Lab Results   Component Value Date    MCHC 32.7 08/08/2019     Lab Results   Component Value Date    RDW 14.4 08/08/2019     Lab Results   Component Value Date     08/08/2019      Recent Labs   Lab Test 08/08/19  0852 09/18/18  0805    140   POTASSIUM 3.8 4.0   CHLORIDE 109 106   CO2 26 26   ANIONGAP 6 8   * 117*   BUN 15 16   CR 0.73 0.78   LASHAY 8.3* 8.4*     Recent Labs   Lab Test 08/08/19  0852 09/18/18  0805  06/23/15  0803 06/25/14  0712   CHOL 125 136   < > 184 188   HDL 34* 36*   < > 36* 39*   LDL 65 70   < > 89 105   TRIG 130 150*   < > 295* 218*   CHOLHDLRATIO  --   --   --  5.1* 4.8   NHDL 91 100   < >  --   --     < > = values in this interval not displayed.        ESTER Du MD

## 2019-08-29 NOTE — LETTER
8/29/2019    Sinan Stephens MD  [address] [facility] [address]    RE: Giacomo Solis       Dear Colleague,    I had the pleasure of seeing Giacomo Solis in the [facility].    CARDIOLOGY CONSULTATION:    Please see dictated note    PAST MEDICAL HISTORY:  Past Medical History:   Diagnosis Date     Atrial fibrillation (H)     paroxysmal     Basal cell carcinoma      COPD (chronic obstructive pulmonary disease) (H) 08/06/2019    FEV1 53 %     Coronary artery disease involving native coronary artery of native heart 11/30/16    NSTEMI (peak troponin 0.05); coronary angiogram showed modeerate nonocclusive disease: LM 30-40 %, LAD 30-40%, CX 30-40 %     Diverticulosis of colon 4/16/12    sigmoid diverticulosis per CT scan; episode acute diverticulitis per CT scan     Heart valve insufficiency      Lipoma of other skin and subcutaneous tissue      Lumbar spinal stenosis 9/1/10    lumbar spinal stenosis at L5     NSTEMI (non-ST elevated myocardial infarction) (H) 11/30/16    NSTEMI (peak troponin 0.05); no acute lesions on angiogram     Obesity (BMI 30-39.9) 7/9/13    BMI 38     Obstructive sleep apnea 10/7/14    per sleep study:  AHI 25/hr, desats to 84%. Supine /hr, only 6 minutes spent supine     Other and unspecified hyperlipidemia      Prediabetes 7/9/13    elevated fasting glucoses     Shingles 11/2017    dx when he was in [place]     Tubular adenoma of colon 8/13/13    3 adenomatous poltyps removed, 4 hyperplastic polyps removed     Unspecified essential hypertension        CURRENT MEDICATIONS:  Current Outpatient Medications   Medication Sig Dispense Refill     albuterol (PROAIR HFA/PROVENTIL HFA/VENTOLIN HFA) 108 (90 Base) MCG/ACT inhaler Inhale 2 puffs into the lungs every 4 hours as needed for shortness of breath / dyspnea or wheezing 1 Inhaler 11     amLODIPine (NORVASC) 10 MG tablet Take 1 tablet (10 mg) by mouth daily 90 tablet 3     aspirin 81 MG tablet  Take 1 tablet by mouth daily.       fluticasone-salmeterol (AIRDUO RESPICLICK) 232-14 MCG/ACT inhaler Inhale 1 puff into the lungs 2 times daily 1 Inhaler 11     furosemide (LASIX) 20 MG tablet Take 2 tablets (40 mg) by mouth every morning 180 tablet 3     lisinopril (PRINIVIL/ZESTRIL) 40 MG tablet Take 1 tablet (40 mg) by mouth daily 90 tablet 3     metoprolol succinate (TOPROL-XL) 50 MG 24 hr tablet Take 1.5 tablets (75 mg) by mouth every morning 135 tablet 3     omeprazole (PRILOSEC) 20 MG DR capsule TAKE 1 CAPSULE(20 MG) BY MOUTH DAILY (Patient taking differently: TAKE 1 CAPSULE(20 MG) BY EVERY OTHER DAY) 90 capsule 2     rosuvastatin (CRESTOR) 40 MG tablet Take 1 tablet (40 mg) by mouth daily 90 tablet 1       PAST SURGICAL HISTORY:  Past Surgical History:   Procedure Laterality Date     COLONOSCOPY  13    3 adenomatous polyps and 4 hyperplastic polyps removed     HC TOOTH EXTRACTION W/FORCEP      4 wisdom teeth removed withou difficulty       ALLERGIES  No known allergies    FAMILY HX:  Family History   Problem Relation Age of Onset     Hypertension Mother         B:1920     Colon Cancer Mother 84     Kidney Cancer Mother 95         age 95     Heart Disease Father         D: 70's  MI     Hypertension Brother         lipids as well     Heart Disease Brother         CABG x 3, mitral valve reaplcement     Colon Cancer Brother 68     Respiratory Brother         PRACHI     Myocardial Infarction Brother 69        sudden cardiac death     Diabetes No family hx of      Cerebrovascular Disease No family hx of        SOCIAL HX:  Social History     Socioeconomic History     Marital status:      Spouse name: None     Number of children: None     Years of education: None     Highest education level: None   Occupational History     None   Social Needs     Financial resource strain: None     Food insecurity:     Worry: None     Inability: None     Transportation needs:     Medical: None     Non-medical: None    Tobacco Use     Smoking status: Former Smoker     Packs/day: 0.75     Years: 20.00     Pack years: 15.00     Types: Cigarettes     Last attempt to quit: 2006     Years since quittin.3     Smokeless tobacco: Never Used     Tobacco comment: had quit for 1 year, smoked on and off for years, quit again    Substance and Sexual Activity     Alcohol use: Yes     Alcohol/week: 2.4 oz     Types: 4 Glasses of wine per week     Comment: 2 glasses of wine daily      Drug use: No     Sexual activity: Never   Lifestyle     Physical activity:     Days per week: None     Minutes per session: None     Stress: None   Relationships     Social connections:     Talks on phone: None     Gets together: None     Attends Jainism service: None     Active member of club or organization: None     Attends meetings of clubs or organizations: None     Relationship status: None     Intimate partner violence:     Fear of current or ex partner: None     Emotionally abused: None     Physically abused: None     Forced sexual activity: None   Other Topics Concern     Parent/sibling w/ CABG, MI or angioplasty before 65F 55M? Not Asked      Service Not Asked     Blood Transfusions Not Asked     Caffeine Concern Not Asked     Occupational Exposure Not Asked     Hobby Hazards Not Asked     Sleep Concern Not Asked     Stress Concern Not Asked     Weight Concern Not Asked     Special Diet No     Back Care Not Asked     Exercise No     Bike Helmet Not Asked     Seat Belt Not Asked     Self-Exams Not Asked   Social History Narrative     None       ROS:  Constitutional: No fever, chills, or sweats. No weight gain/loss.   ENT: No visual disturbance, ear ache, epistaxis, sore throat.   Allergies/Immunologic: Negative.   Respiratory: No cough, hemoptysis.   Cardiovascular: As per HPI.   GI: No nausea, vomiting, hematemesis, melena, or hematochezia.   : No urinary frequency, dysuria, or hematuria.   Integument: Negative.   Psychiatric:  "Negative.   Neuro: Negative.   Endocrinology: Negative.   Musculoskeletal: No myalgia.    VITAL SIGNS:  /75   Pulse 66   Ht 1.753 m (5' 9\")   Wt 123.4 kg (272 lb)   BMI 40.17 kg/m     Body mass index is 40.17 kg/m .  Wt Readings from Last 2 Encounters:   08/29/19 123.4 kg (272 lb)   08/06/19 123.4 kg (272 lb)       PHYSICAL EXAM  Giacomo Solis IS A 69 year old male.in no acute distress.  HEENT: Unremarkable.  Neck: JVP normal.  Carotids +4/4 bilaterally without bruits.  Lungs: CTA.  Cor: RRR. Normal S1 and S2.  2/ mid peaking systolic murmur.  PMI in Lf 5th ICS.  Abd: Soft, nontender, nondistended.  NABS.  No pulsatile mass.  Extremities: No C/C/E.  Pulses +4/4 symmetric in upper and lower extremities.  Neuro: Grossly intact.    LABS    Lab Results   Component Value Date    WBC 6.1 08/08/2019     Lab Results   Component Value Date    RBC 5.09 08/08/2019     Lab Results   Component Value Date    HGB 14.3 08/08/2019     Lab Results   Component Value Date    HCT 43.7 08/08/2019     No components found for: MCT  Lab Results   Component Value Date    MCV 86 08/08/2019     Lab Results   Component Value Date    MCH 28.1 08/08/2019     Lab Results   Component Value Date    MCHC 32.7 08/08/2019     Lab Results   Component Value Date    RDW 14.4 08/08/2019     Lab Results   Component Value Date     08/08/2019      Recent Labs   Lab Test 08/08/19  0852 09/18/18  0805    140   POTASSIUM 3.8 4.0   CHLORIDE 109 106   CO2 26 26   ANIONGAP 6 8   * 117*   BUN 15 16   CR 0.73 0.78   LASHAY 8.3* 8.4*     Recent Labs   Lab Test 08/08/19  0852 09/18/18  0805  06/23/15  0803 06/25/14  0712   CHOL 125 136   < > 184 188   HDL 34* 36*   < > 36* 39*   LDL 65 70   < > 89 105   TRIG 130 150*   < > 295* 218*   CHOLHDLRATIO  --   --   --  5.1* 4.8   NHDL 91 100   < >  --   --     < > = values in this interval not displayed.        ESTER Du MD     Service Date: 08/29/2019      HISTORY OF PRESENT ILLNESS:  Mr. " Irma is a very pleasant, 69-year-old gentleman whom I met in 11/2016 when he presented with an NSTEMI.  He was found to have moderate aortic valve stenosis with a mean gradient that has been between 18-22 and moderate disease in his coronary arteries with small vessels distally.  He has been managed medically and doing really well.  I last saw him in 06/2018.      Since I last saw him, he has unfortunately gained a bit of weight.  His BMI is up to 40 with his 15 pound weight gain, which his wife attributes to dietary indiscretion and lack of exercise.  He does not have any symptoms of claudication, but he does have some joint pain, but [place] the [place] and was able to walk through the fair without any issues, again without any cardiac symptoms.  His blood pressure is under excellent control.  His cholesterol is also well controlled.  He has a history of paroxysmal atrial fibrillation, but did have a GI bleed on Xarelto, and so it was recommended he not restart that.  He is in sinus rhythm today.  His brother since I last saw him did have his aortic valve replaced, and it may be bicuspid.  On Mr. Solis's valve, it is not clear whether it is bicuspid because of the degree of calcification, but given his brother's disease, it likely is the case.  His ascending aorta has been between 3.8-4.1.      Mr. Solis does have moderate obstructive sleep apnea on a sleep study in 2014 here at [place].  He apparently had another sleep study in [place] this year that also has recommended a CPAP mask.  He still has not done that.  He is willing to be fitted for that, however, and so he is going to see a sleep medicine doctor here.  Last stress test was in 07/2018 with a stress echo, and it was negative for ischemia.      IMPRESSION, REPORT, PLAN:   1.  Moderate coronary artery disease, managed medically with last cath 12/2016, currently asymptomatic.   2.  Hypertension, well controlled.   3.  History of GI  bleed on Xarelto, currently not anticoagulated as a result.   4.  Paroxysmal atrial fibrillation.   5.  Hyperlipidemia, well controlled.   6.  Obesity.   7.  Moderate aortic valve stenosis with a peak gradient of 22.     8.  Ascending aortic dilatation at 4 cm.   9.  Obstructive sleep apnea.      DISCUSSION:  It was a pleasure seeing Mr. Hernandez in followup.  He is doing well from a cardiovascular standpoint and does not have any concerning symptoms.  It sounds like his brother recently had his aortic valve replaced, which would indicate that Mr. Hernandez's valve likely is bicuspid.  Regardless, it is just remaining relatively stable with a mean gradient of 22.  We can continue to follow.  He is asymptomatic.  We discussed in detail diet and exercise.  He knows he needs to work on that, and we gave him strategies to help in that regard.  We discussed getting fitted for a CPAP mask, and trying to use that will help overall health, which he understands.  Otherwise, we will plan to see him back in a year with an echo or sooner if there are any issues in the interim.        It was a pleasure to see him today.  Please do not hesitate to contact me with any questions or concerns.         DUC DU MD             D: 2019   T: 2019   MT: ginette      Name:     TISHA HERNANDEZ   MRN:      [id number]        Account:      [id number]   :      1950           Service Date: 2019      Document: [id number]       Thank you for allowing me to participate in the care of your patient.      Sincerely,     Duc Du MD     [facility]    cc:   Sinan Stephens MD  [address] [place] [id number]

## 2019-08-29 NOTE — LETTER
8/29/2019      Sinan Stephens MD  [address] [facility] [address]      RE: Giacomo Solis       Dear Colleague,    I had the pleasure of seeing Giacomo Solis in the [facility].    Service Date: 08/29/2019      HISTORY OF PRESENT ILLNESS:  Mr. Solis is a very pleasant, 69-year-old gentleman whom I met in 11/2016 when he presented with an NSTEMI.  He was found to have moderate aortic valve stenosis with a mean gradient that has been between 18-22 and moderate disease in his coronary arteries with small vessels distally.  He has been managed medically and doing really well.  I last saw him in 06/2018.      Since I last saw him, he has unfortunately gained a bit of weight.  His BMI is up to 40 with his 15 pound weight gain, which his wife attributes to dietary indiscretion and lack of exercise.  He does not have any symptoms of claudication, but he does have some joint pain, but he recently went [place] the fair and was able to walk through the fair without any issues, again without any cardiac symptoms.  His blood pressure is under excellent control.  His cholesterol is also well controlled.  He has a history of paroxysmal atrial fibrillation, but did have a GI bleed on Xarelto, and so it was recommended he not restart that.  He is in sinus rhythm today.  His brother since I last saw him did have his aortic valve replaced, and it may be bicuspid.  On Mr. Solis's valve, it is not clear whether it is bicuspid because of the degree of calcification, but given his brother's disease, it likely is the case.  His ascending aorta has been between 3.8-4.1.      Mr. Solis does have moderate obstructive sleep apnea on a sleep study in 2014 here at [place].  He apparently had another sleep study in [place] this year that also has recommended a CPAP mask.  He still has not done that.  He is willing to be fitted for that, however, and so he is going to see a sleep medicine  doctor here.  Last stress test was in 2018 with a stress echo, and it was negative for ischemia.      IMPRESSION, REPORT, PLAN:   1.  Moderate coronary artery disease, managed medically with last cath 2016, currently asymptomatic.   2.  Hypertension, well controlled.   3.  History of GI bleed on Xarelto, currently not anticoagulated as a result.   4.  Paroxysmal atrial fibrillation.   5.  Hyperlipidemia, well controlled.   6.  Obesity.   7.  Moderate aortic valve stenosis with a peak gradient of 22.     8.  Ascending aortic dilatation at 4 cm.   9.  Obstructive sleep apnea.      DISCUSSION:  It was a pleasure seeing Mr. Solis in followup.  He is doing well from a cardiovascular standpoint and does not have any concerning symptoms.  It sounds like his brother recently had his aortic valve replaced, which would indicate that Mr. Solis's valve likely is bicuspid.  Regardless, it is just remaining relatively stable with a mean gradient of 22.  We can continue to follow.  He is asymptomatic.  We discussed in detail diet and exercise.  He knows he needs to work on that, and we gave him strategies to help in that regard.  We discussed getting fitted for a CPAP mask, and trying to use that will help overall health, which he understands.  Otherwise, we will plan to see him back in a year with an echo or sooner if there are any issues in the interim.        It was a pleasure to see him today.  Please do not hesitate to contact me with any questions or concerns.         SADI THIBODEAUX MD             D: 2019   T: 2019   MT: ginette      Name:     TISHA SOLIS   MRN:      [id number]        Account:      [id number]   :      1950           Service Date: 2019      Document: [id number]         Outpatient Encounter Medications as of 2019   Medication Sig Dispense Refill     albuterol (PROAIR HFA/PROVENTIL HFA/VENTOLIN HFA) 108 (90 Base) MCG/ACT inhaler Inhale 2 puffs into the lungs  every 4 hours as needed for shortness of breath / dyspnea or wheezing 1 Inhaler 11     amLODIPine (NORVASC) 10 MG tablet Take 1 tablet (10 mg) by mouth daily 90 tablet 3     aspirin 81 MG tablet Take 1 tablet by mouth daily.       fluticasone-salmeterol (AIRDUO RESPICLICK) 232-14 MCG/ACT inhaler Inhale 1 puff into the lungs 2 times daily 1 Inhaler 11     furosemide (LASIX) 20 MG tablet Take 2 tablets (40 mg) by mouth every morning 180 tablet 3     lisinopril (PRINIVIL/ZESTRIL) 40 MG tablet Take 1 tablet (40 mg) by mouth daily 90 tablet 3     metoprolol succinate (TOPROL-XL) 50 MG 24 hr tablet Take 1.5 tablets (75 mg) by mouth every morning 135 tablet 3     omeprazole (PRILOSEC) 20 MG DR capsule TAKE 1 CAPSULE(20 MG) BY MOUTH DAILY (Patient taking differently: TAKE 1 CAPSULE(20 MG) BY EVERY OTHER DAY) 90 capsule 2     rosuvastatin (CRESTOR) 40 MG tablet Take 1 tablet (40 mg) by mouth daily 90 tablet 1     No facility-administered encounter medications on file as of 8/29/2019.        Again, thank you for allowing me to participate in the care of your patient.      Sincerely,    Duc Du MD     [facility]

## 2019-09-26 DIAGNOSIS — E78.5 HYPERLIPIDEMIA LDL GOAL <70: ICD-10-CM

## 2019-09-26 RX ORDER — ROSUVASTATIN CALCIUM 40 MG/1
40 TABLET, COATED ORAL DAILY
Qty: 90 TABLET | Refills: 3 | Status: SHIPPED | OUTPATIENT
Start: 2019-09-26 | End: 2020-07-31

## 2019-10-09 DIAGNOSIS — I21.4 NSTEMI (NON-ST ELEVATED MYOCARDIAL INFARCTION) (H): ICD-10-CM

## 2019-10-09 RX ORDER — METOPROLOL SUCCINATE 50 MG/1
75 TABLET, EXTENDED RELEASE ORAL EVERY MORNING
Qty: 135 TABLET | Refills: 2 | Status: SHIPPED | OUTPATIENT
Start: 2019-10-09 | End: 2020-07-31

## 2019-10-09 NOTE — TELEPHONE ENCOUNTER
Requested Prescriptions   Pending Prescriptions Disp Refills     metoprolol succinate ER (TOPROL-XL) 50 MG 24 hr tablet 135 tablet 3     Sig: Take 1.5 tablets (75 mg) by mouth every morning       There is no refill protocol information for this order      Last Written Prescription Date:  09/21/18  Last Fill Quantity: 135,  # refills: 3   Last office visit: 8/6/2019 with prescribing provider:  08/06/19   Future Office Visit:  0

## 2019-10-09 NOTE — TELEPHONE ENCOUNTER
"Requested Prescriptions   Pending Prescriptions Disp Refills     metoprolol succinate ER (TOPROL-XL) 50 MG 24 hr tablet 135 tablet 3     Sig: Take 1.5 tablets (75 mg) by mouth every morning       Beta-Blockers Protocol Passed - 10/9/2019  9:43 AM        Passed - Blood pressure under 140/90 in past 12 months     BP Readings from Last 3 Encounters:   08/29/19 124/75   08/06/19 118/70   09/21/18 118/72                 Passed - Patient is age 6 or older        Passed - Recent (12 mo) or future (30 days) visit within the authorizing provider's specialty     Patient has had an office visit with the authorizing provider or a provider within the authorizing providers department within the previous 12 mos or has a future within next 30 days. See \"Patient Info\" tab in inbasket, or \"Choose Columns\" in Meds & Orders section of the refill encounter.              Passed - Medication is active on med list          "

## 2019-10-15 DIAGNOSIS — I10 ESSENTIAL HYPERTENSION: ICD-10-CM

## 2019-10-15 RX ORDER — LISINOPRIL 40 MG/1
40 TABLET ORAL DAILY
Qty: 90 TABLET | Refills: 0 | Status: SHIPPED | OUTPATIENT
Start: 2019-10-15 | End: 2020-01-13

## 2019-10-15 RX ORDER — AMLODIPINE BESYLATE 10 MG/1
10 TABLET ORAL DAILY
Qty: 90 TABLET | Refills: 0 | Status: SHIPPED | OUTPATIENT
Start: 2019-10-15 | End: 2020-07-31

## 2019-10-15 NOTE — TELEPHONE ENCOUNTER
"Requested Prescriptions   Pending Prescriptions Disp Refills     lisinopril (PRINIVIL/ZESTRIL) 40 MG tablet 90 tablet 3     Sig: Take 1 tablet (40 mg) by mouth daily       ACE Inhibitors (Including Combos) Protocol Passed - 10/15/2019 12:30 PM        Passed - Blood pressure under 140/90 in past 12 months     BP Readings from Last 3 Encounters:   08/29/19 124/75   08/06/19 118/70   09/21/18 118/72                 Passed - Recent (12 mo) or future (30 days) visit within the authorizing provider's specialty     Patient has had an office visit with the authorizing provider or a provider within the authorizing providers department within the previous 12 mos or has a future within next 30 days. See \"Patient Info\" tab in inbasket, or \"Choose Columns\" in Meds & Orders section of the refill encounter.              Passed - Medication is active on med list        Passed - Patient is age 18 or older        Passed - Normal serum creatinine on file in past 12 months     Recent Labs   Lab Test 08/08/19  0852   CR 0.73             Passed - Normal serum potassium on file in past 12 months     Recent Labs   Lab Test 08/08/19  0852   POTASSIUM 3.8             amLODIPine (NORVASC) 10 MG tablet 90 tablet 3     Sig: Take 1 tablet (10 mg) by mouth daily       Calcium Channel Blockers Protocol  Passed - 10/15/2019 12:30 PM        Passed - Blood pressure under 140/90 in past 12 months     BP Readings from Last 3 Encounters:   08/29/19 124/75   08/06/19 118/70   09/21/18 118/72                 Passed - Recent (12 mo) or future (30 days) visit within the authorizing provider's specialty     Patient has had an office visit with the authorizing provider or a provider within the authorizing providers department within the previous 12 mos or has a future within next 30 days. See \"Patient Info\" tab in inbasket, or \"Choose Columns\" in Meds & Orders section of the refill encounter.              Passed - Medication is active on med list        " Passed - Patient is age 18 or older        Passed - Normal serum creatinine on file in past 12 months     Recent Labs   Lab Test 08/08/19  0852   CR 0.73               Prescription approved per [facility] [place].    Coty MUJICA RN, BSN, PHN

## 2019-10-17 DIAGNOSIS — R60.0 BILATERAL LOWER EXTREMITY EDEMA: ICD-10-CM

## 2019-10-17 RX ORDER — FUROSEMIDE 20 MG
40 TABLET ORAL EVERY MORNING
Qty: 180 TABLET | Refills: 1 | Status: SHIPPED | OUTPATIENT
Start: 2019-10-17 | End: 2020-04-03

## 2019-10-17 NOTE — TELEPHONE ENCOUNTER
Requested Prescriptions   Pending Prescriptions Disp Refills     furosemide (LASIX) 20 MG tablet 180 tablet 3     Sig: Take 2 tablets (40 mg) by mouth every morning       There is no refill protocol information for this order      Last Written Prescription Date:  09/21/18  Last Fill Quantity: 180,  # refills: 3   Last office visit: 8/6/2019 with prescribing provider:  08/06/19   Future Office Visit:  0

## 2019-10-17 NOTE — TELEPHONE ENCOUNTER
"Requested Prescriptions   Pending Prescriptions Disp Refills     furosemide (LASIX) 20 MG tablet 180 tablet 3     Sig: Take 2 tablets (40 mg) by mouth every morning       Diuretics (Including Combos) Protocol Passed - 10/17/2019  9:35 AM        Passed - Blood pressure under 140/90 in past 12 months     BP Readings from Last 3 Encounters:   08/29/19 124/75   08/06/19 118/70   09/21/18 118/72                 Passed - Recent (12 mo) or future (30 days) visit within the authorizing provider's specialty     Patient has had an office visit with the authorizing provider or a provider within the authorizing providers department within the previous 12 mos or has a future within next 30 days. See \"Patient Info\" tab in inbasket, or \"Choose Columns\" in Meds & Orders section of the refill encounter.              Passed - Medication is active on med list        Passed - Patient is age 18 or older        Passed - Normal serum creatinine on file in past 12 months     Recent Labs   Lab Test 08/08/19  0852   CR 0.73              Passed - Normal serum potassium on file in past 12 months     Recent Labs   Lab Test 08/08/19  0852   POTASSIUM 3.8                    Passed - Normal serum sodium on file in past 12 months     Recent Labs   Lab Test 08/08/19  0852                   Prescription approved per [facility] Refill Protocol.    Coty MUJICA RN, BSN, PHN      "

## 2019-12-15 ENCOUNTER — HEALTH MAINTENANCE LETTER (OUTPATIENT)
Age: 69
End: 2019-12-15

## 2020-01-09 DIAGNOSIS — I10 ESSENTIAL HYPERTENSION: ICD-10-CM

## 2020-01-10 NOTE — TELEPHONE ENCOUNTER
"Requested Prescriptions   Pending Prescriptions Disp Refills     lisinopril (PRINIVIL/ZESTRIL) 40 MG tablet [Pharmacy Med Name: LISINOPRIL 40MG TABLETS] 90 tablet 0     Sig: TAKE 1 TABLET BY MOUTH ONCE DAILY   Last Written Prescription Date:  10/15/2019  Last Fill Quantity: 90,  # refills: 0   Last Office Visit: 8/6/2019   Future Office Visit:         ACE Inhibitors (Including Combos) Protocol Passed - 1/9/2020  4:32 PM        Passed - Blood pressure under 140/90 in past 12 months     BP Readings from Last 3 Encounters:   08/29/19 124/75   08/06/19 118/70   09/21/18 118/72                 Passed - Recent (12 mo) or future (30 days) visit within the authorizing provider's specialty     Patient has had an office visit with the authorizing provider or a provider within the authorizing providers department within the previous 12 mos or has a future within next 30 days. See \"Patient Info\" tab in inbasket, or \"Choose Columns\" in Meds & Orders section of the refill encounter.              Passed - Medication is active on med list        Passed - Patient is age 18 or older        Passed - Normal serum creatinine on file in past 12 months     Recent Labs   Lab Test 08/08/19  0852   CR 0.73             Passed - Normal serum potassium on file in past 12 months     Recent Labs   Lab Test 08/08/19  0852   POTASSIUM 3.8               "

## 2020-01-13 RX ORDER — LISINOPRIL 40 MG/1
TABLET ORAL
Qty: 90 TABLET | Refills: 1 | Status: SHIPPED | OUTPATIENT
Start: 2020-01-13 | End: 2020-07-05

## 2020-04-03 DIAGNOSIS — R60.0 BILATERAL LOWER EXTREMITY EDEMA: ICD-10-CM

## 2020-04-03 RX ORDER — FUROSEMIDE 20 MG
TABLET ORAL
Qty: 180 TABLET | Refills: 0 | Status: SHIPPED | OUTPATIENT
Start: 2020-04-03 | End: 2020-07-05

## 2020-04-03 NOTE — TELEPHONE ENCOUNTER
"Requested Prescriptions   Pending Prescriptions Disp Refills     furosemide (LASIX) 20 MG tablet [Pharmacy Med Name: FUROSEMIDE 20MG TABLETS] 180 tablet 1     Sig: TAKE 2 TABLETS(40 MG) BY MOUTH EVERY MORNING       Diuretics (Including Combos) Protocol Passed - 4/3/2020  3:24 PM        Passed - Blood pressure under 140/90 in past 12 months     BP Readings from Last 3 Encounters:   08/29/19 124/75   08/06/19 118/70   09/21/18 118/72                 Passed - Recent (12 mo) or future (30 days) visit within the authorizing provider's specialty     Patient has had an office visit with the authorizing provider or a provider within the authorizing providers department within the previous 12 mos or has a future within next 30 days. See \"Patient Info\" tab in inbasket, or \"Choose Columns\" in Meds & Orders section of the refill encounter.              Passed - Medication is active on med list        Passed - Patient is age 18 or older        Passed - Normal serum creatinine on file in past 12 months     Recent Labs   Lab Test 08/08/19  0852   CR 0.73              Passed - Normal serum potassium on file in past 12 months     Recent Labs   Lab Test 08/08/19  0852   POTASSIUM 3.8                    Passed - Normal serum sodium on file in past 12 months     Recent Labs   Lab Test 08/08/19  0852                      "

## 2020-07-05 ENCOUNTER — MYC REFILL (OUTPATIENT)
Dept: INTERNAL MEDICINE | Facility: CLINIC | Age: 70
End: 2020-07-05

## 2020-07-05 DIAGNOSIS — R60.0 BILATERAL LOWER EXTREMITY EDEMA: ICD-10-CM

## 2020-07-05 DIAGNOSIS — I10 ESSENTIAL HYPERTENSION: ICD-10-CM

## 2020-07-07 RX ORDER — LISINOPRIL 40 MG/1
40 TABLET ORAL DAILY
Qty: 90 TABLET | Refills: 0 | Status: SHIPPED | OUTPATIENT
Start: 2020-07-07 | End: 2020-07-31

## 2020-07-07 RX ORDER — FUROSEMIDE 20 MG
TABLET ORAL
Qty: 180 TABLET | Refills: 0 | Status: SHIPPED | OUTPATIENT
Start: 2020-07-07 | End: 2020-07-31

## 2020-07-31 ENCOUNTER — OFFICE VISIT (OUTPATIENT)
Dept: INTERNAL MEDICINE | Facility: CLINIC | Age: 70
End: 2020-07-31
Payer: MEDICARE

## 2020-07-31 VITALS
OXYGEN SATURATION: 96 % | HEART RATE: 65 BPM | TEMPERATURE: 97.3 F | DIASTOLIC BLOOD PRESSURE: 78 MMHG | WEIGHT: 266 LBS | SYSTOLIC BLOOD PRESSURE: 113 MMHG | HEIGHT: 69 IN | BODY MASS INDEX: 39.4 KG/M2

## 2020-07-31 DIAGNOSIS — Z00.00 ENCOUNTER FOR MEDICARE ANNUAL WELLNESS EXAM: Primary | ICD-10-CM

## 2020-07-31 DIAGNOSIS — Z12.5 SCREENING FOR PROSTATE CANCER: ICD-10-CM

## 2020-07-31 DIAGNOSIS — R60.0 BILATERAL LOWER EXTREMITY EDEMA: ICD-10-CM

## 2020-07-31 DIAGNOSIS — R73.03 PREDIABETES: ICD-10-CM

## 2020-07-31 DIAGNOSIS — E66.01 SEVERE OBESITY (BMI 35.0-39.9) WITH COMORBIDITY (H): ICD-10-CM

## 2020-07-31 DIAGNOSIS — I21.4 NSTEMI (NON-ST ELEVATED MYOCARDIAL INFARCTION) (H): ICD-10-CM

## 2020-07-31 DIAGNOSIS — I35.0 NONRHEUMATIC AORTIC VALVE STENOSIS: ICD-10-CM

## 2020-07-31 DIAGNOSIS — E78.5 HYPERLIPIDEMIA LDL GOAL <70: ICD-10-CM

## 2020-07-31 DIAGNOSIS — Z23 NEED FOR PNEUMOCOCCAL VACCINATION: ICD-10-CM

## 2020-07-31 DIAGNOSIS — G47.33 OBSTRUCTIVE SLEEP APNEA ON CPAP: ICD-10-CM

## 2020-07-31 DIAGNOSIS — I10 BENIGN ESSENTIAL HYPERTENSION: ICD-10-CM

## 2020-07-31 DIAGNOSIS — K21.9 GASTROESOPHAGEAL REFLUX DISEASE WITHOUT ESOPHAGITIS: ICD-10-CM

## 2020-07-31 DIAGNOSIS — J44.9 CHRONIC OBSTRUCTIVE PULMONARY DISEASE, UNSPECIFIED COPD TYPE (H): ICD-10-CM

## 2020-07-31 LAB
ALBUMIN SERPL-MCNC: 3.6 G/DL (ref 3.4–5)
ALP SERPL-CCNC: 78 U/L (ref 40–150)
ALT SERPL W P-5'-P-CCNC: 32 U/L (ref 0–70)
ANION GAP SERPL CALCULATED.3IONS-SCNC: 6 MMOL/L (ref 3–14)
AST SERPL W P-5'-P-CCNC: 18 U/L (ref 0–45)
BILIRUB SERPL-MCNC: 0.8 MG/DL (ref 0.2–1.3)
BUN SERPL-MCNC: 14 MG/DL (ref 7–30)
CALCIUM SERPL-MCNC: 8.5 MG/DL (ref 8.5–10.1)
CHLORIDE SERPL-SCNC: 106 MMOL/L (ref 94–109)
CHOLEST SERPL-MCNC: 129 MG/DL
CO2 SERPL-SCNC: 27 MMOL/L (ref 20–32)
CREAT SERPL-MCNC: 0.77 MG/DL (ref 0.66–1.25)
ERYTHROCYTE [DISTWIDTH] IN BLOOD BY AUTOMATED COUNT: 14.3 % (ref 10–15)
GFR SERPL CREATININE-BSD FRML MDRD: >90 ML/MIN/{1.73_M2}
GLUCOSE SERPL-MCNC: 109 MG/DL (ref 70–99)
HBA1C MFR BLD: 6.1 % (ref 0–5.6)
HCT VFR BLD AUTO: 46.2 % (ref 40–53)
HDLC SERPL-MCNC: 45 MG/DL
HGB BLD-MCNC: 15 G/DL (ref 13.3–17.7)
LDLC SERPL CALC-MCNC: 70 MG/DL
MCH RBC QN AUTO: 28.5 PG (ref 26.5–33)
MCHC RBC AUTO-ENTMCNC: 32.5 G/DL (ref 31.5–36.5)
MCV RBC AUTO: 88 FL (ref 78–100)
NONHDLC SERPL-MCNC: 84 MG/DL
PLATELET # BLD AUTO: 204 10E9/L (ref 150–450)
POTASSIUM SERPL-SCNC: 4.2 MMOL/L (ref 3.4–5.3)
PROT SERPL-MCNC: 7.6 G/DL (ref 6.8–8.8)
PSA SERPL-ACNC: 0.89 UG/L (ref 0–4)
RBC # BLD AUTO: 5.27 10E12/L (ref 4.4–5.9)
SODIUM SERPL-SCNC: 139 MMOL/L (ref 133–144)
TRIGL SERPL-MCNC: 71 MG/DL
WBC # BLD AUTO: 6 10E9/L (ref 4–11)

## 2020-07-31 PROCEDURE — G0009 ADMIN PNEUMOCOCCAL VACCINE: HCPCS | Performed by: INTERNAL MEDICINE

## 2020-07-31 PROCEDURE — G0103 PSA SCREENING: HCPCS | Performed by: INTERNAL MEDICINE

## 2020-07-31 PROCEDURE — 36415 COLL VENOUS BLD VENIPUNCTURE: CPT | Performed by: INTERNAL MEDICINE

## 2020-07-31 PROCEDURE — G0438 PPPS, INITIAL VISIT: HCPCS | Performed by: INTERNAL MEDICINE

## 2020-07-31 PROCEDURE — 80061 LIPID PANEL: CPT | Performed by: INTERNAL MEDICINE

## 2020-07-31 PROCEDURE — 90732 PPSV23 VACC 2 YRS+ SUBQ/IM: CPT | Performed by: INTERNAL MEDICINE

## 2020-07-31 PROCEDURE — 80053 COMPREHEN METABOLIC PANEL: CPT | Performed by: INTERNAL MEDICINE

## 2020-07-31 PROCEDURE — 85027 COMPLETE CBC AUTOMATED: CPT | Performed by: INTERNAL MEDICINE

## 2020-07-31 PROCEDURE — 99213 OFFICE O/P EST LOW 20 MIN: CPT | Mod: 25 | Performed by: INTERNAL MEDICINE

## 2020-07-31 PROCEDURE — 83036 HEMOGLOBIN GLYCOSYLATED A1C: CPT | Performed by: INTERNAL MEDICINE

## 2020-07-31 RX ORDER — LISINOPRIL 40 MG/1
40 TABLET ORAL DAILY
Qty: 90 TABLET | Refills: 3 | Status: SHIPPED | OUTPATIENT
Start: 2020-07-31 | End: 2021-08-10

## 2020-07-31 RX ORDER — AMLODIPINE BESYLATE 5 MG/1
5 TABLET ORAL DAILY
Qty: 90 TABLET | Refills: 3 | Status: SHIPPED | OUTPATIENT
Start: 2020-07-31 | End: 2021-08-02

## 2020-07-31 RX ORDER — ROSUVASTATIN CALCIUM 40 MG/1
40 TABLET, COATED ORAL DAILY
Qty: 90 TABLET | Refills: 3 | Status: SHIPPED | OUTPATIENT
Start: 2020-07-31 | End: 2021-08-10

## 2020-07-31 RX ORDER — ALBUTEROL SULFATE 90 UG/1
2 AEROSOL, METERED RESPIRATORY (INHALATION) EVERY 4 HOURS PRN
Qty: 1 INHALER | Refills: 11 | Status: SHIPPED | OUTPATIENT
Start: 2020-07-31 | End: 2021-08-10

## 2020-07-31 RX ORDER — METOPROLOL SUCCINATE 50 MG/1
75 TABLET, EXTENDED RELEASE ORAL EVERY MORNING
Qty: 135 TABLET | Refills: 3 | Status: SHIPPED | OUTPATIENT
Start: 2020-07-31 | End: 2021-08-10

## 2020-07-31 RX ORDER — FLUTICASONE PROPIONATE AND SALMETEROL 232; 14 UG/1; UG/1
1 POWDER, METERED RESPIRATORY (INHALATION) 2 TIMES DAILY
Qty: 1 INHALER | Refills: 11 | Status: SHIPPED | OUTPATIENT
Start: 2020-07-31 | End: 2021-08-10

## 2020-07-31 RX ORDER — FUROSEMIDE 20 MG
TABLET ORAL
Qty: 180 TABLET | Refills: 3 | Status: SHIPPED | OUTPATIENT
Start: 2020-07-31 | End: 2021-08-10

## 2020-07-31 ASSESSMENT — ACTIVITIES OF DAILY LIVING (ADL): CURRENT_FUNCTION: NO ASSISTANCE NEEDED

## 2020-07-31 ASSESSMENT — MIFFLIN-ST. JEOR: SCORE: 1966.95

## 2020-07-31 NOTE — PATIENT INSTRUCTIONS
"*  Continue all medications at the same doses.  Contact your usual pharmacy if you need refills.     *  Work on your diet, reduce the intake of \"simple carbohydrates\" (e.g. White bread, white rice, pasta, noodles, potatoes, snack foods, regular soda, juices (except fresh squeezed), cakes, cookies, candy, etc.) as best able.    This will help you lose weight control/prevent diabetes, and feel better.     *  Second pneumonia vaccine today (pneumovax 23), you had the first one in 2017, you will not need any more pnuemonia vaccines in the future (as far as current guidelines state).    *  Get the annual influenza vaccine in the fall    *  Consider the shingles vaccine (see below)    *  Follow up in the [facility] this summer, you will need recheck of the aortic valve.     *  Colonoscopy next due 2022.    *  Return to see me in 1 year, sooner if needed.  Use naaptol or Call [phone] to schedule the appointment with me.           Shingles Vaccine (SHINGRIX):        I would recommend that you consider getting the Shingrix shingles vaccine.  The shingles vaccine is recommended for anyone over age 50.       The Shingrix vaccine is a series of 2 vaccines given 2-6 months apart.       The shingles vaccine does not stop you from getting shingles, but it decreases the intensity of the event, the duration of the event, and decreases the painful nerve condition that results       Based on the available data, the Shingrix vaccine has superior benefit and should be considered even if you have had the old Zostavax shinglesvaccine before.        Contact your insurance provider and ask them if either shingles vaccine is covered and is so, how much it will cost you.  Usually this will be cheaper to get in a pharmacy given by the pharmacist.      Regardless of the coverage, I would recommend that you consider the vaccine since shingles is very painful, (just ask anyone who has ever had it)      For Medicare insurance, the " "vaccine is cheaper to receive from a pharmacist in a pharmacy than for us to give you in the clinic.          5 GOALS TO PREVENT VASCULAR DISEASE:     1.  Aggressive blood pressure control, under 130/80 ideally.  Using medications if needed.    Your blood pressure is under good control    BP Readings from Last 4 Encounters:   07/31/20 113/78   08/29/19 124/75   08/06/19 118/70   09/21/18 118/72       2.  Aggressive LDL cholesterol (\"bad cholesterol\") lowering as indicated.    Your goal is an LDL under 130 for sure, preferably under 100.  (If you have diabetes or previous vascular disease, the the LDL goals would be under 100 for sure, preferably under 70.)    New guidelines identify four high-risk groups who could benefit from statins:   *people with pre-existing heart disease, such as those who have had a heart attack;   *people ages 40 to 75 who have diabetes of any type  *patients ages 40 to 75 with at least a 7.5% risk of developing cardiovascular disease over the next decade, according to a formula described in the guidelines  *patients with the sort of super-high cholesterol that sometimes runs in families, as evidenced by an LDL of 190 milligrams per deciliter or higher    Your cholesterol levels are well controlled.    Recent Labs   Lab Test 08/08/19  0852 09/18/18  0805  06/23/15  0803 06/25/14  0712   CHOL 125 136   < > 184 188   HDL 34* 36*   < > 36* 39*   LDL 65 70   < > 89 105   TRIG 130 150*   < > 295* 218*   CHOLHDLRATIO  --   --   --  5.1* 4.8    < > = values in this interval not displayed.       3.  Aggressive diabetic prevention, screening and/or management.      *  Manage the prediabetse aggressively.     4.  No smoking    5.  Consider daily preventative aspirin over age 50 if you have enough cardiac risk factors to place you at higher risk for the presence of vascular disease.    If you have any reason not to take aspirin such easy bruising or bleeding, stomach problems, other anticoagulant " "medications, or any other side effects, then you should not take Aspirin.      --Based on your current cardiac risk factor profile, you should take regular daily Aspirin 81 mg once per day.           Preventive Health Recommendations:   Male Ages 65 and over    Yearly exam:             See your health care provider every year in order to  o   Review health changes.   o   Discuss preventive care.    o   Review your medicines if your doctor has prescribed any.    Talk with your health care provider about whether you should have a test to screen for prostate cancer (PSA).    Every 3 years, have a diabetes test (fasting glucose). If you are at risk for diabetes, you should have this test more often.    Every 5 years, have a cholesterol test. Have this test more often if you are at risk for high cholesterol or heart disease.     Every 10 years, have a colonoscopy. Or, have a yearly FIT test (stool test). These exams will check for colon cancer.    Talk to with your health care provider about screening for Abdominal Aortic Aneurysm if you have a family history of AAA or have a history of smoking.    Shots:     Get a flu shot each year.     Get a tetanus shot every 10 years.     Talk to your doctor about your pneumonia vaccines. There are now two you should receive - Pneumovax (PPSV 23) and Prevnar (PCV 13).     Talk to your doctor about a shingles vaccine.     Talk to your doctor about the hepatitis B vaccine.  Nutrition:     Eat at least 5 servings of fruits and vegetables each day.     Eat whole-grain bread, whole-wheat pasta and brown rice instead of white grains and rice.     Talk to your provider about Calcium and Vitamin D.      --Good Grains:  Oats, brown rice, Quinoa (these do not raise the blood sugar as much)     --Bad grains:  Anything made from wheat or white rice     (because these raise the blood sugars significantly, and the possible gluten issue from wheat for some people).      --Proteins:  Aim for \"lean " "proteins\" including chicken, fish, seafood, pork, turkey, and eggs (in moderation); Eat red meat only occasionally      Lifestyle    Exercise for at least 150 minutes a week (30 minutes a day, 5 days a week). This will help you control your weight and prevent disease.     Limit alcohol to one drink per day.     No smoking.     Wear sunscreen to prevent skin cancer.     See your dentist every six months for an exam and cleaning.     See your eye doctor every 1 to 2 years to screen for conditions such as glaucoma, macular degeneration, cataracts, etc             Patient Education   Personalized Prevention Plan  You are due for the preventive services outlined below.  Your care team is available to assist you in scheduling these services.  If you have already completed any of these items, please share that information with your care team to update in your medical record.  Health Maintenance Due   Topic Date Due     COPD Action Plan  1950     Zoster (Shingles) Vaccine (1 of 2) 01/30/2002     Discuss Advance Care Planning  07/19/2016     Annual Wellness Visit  01/30/2017     Pneumococcal Vaccine (2 of 2 - PPSV23) 09/18/2018     PHQ-2  01/01/2020     Colorectal Cancer Screening  08/02/2020     FALL RISK ASSESSMENT  08/06/2020     Cholesterol Lab  08/08/2020     Your Health Risk Assessment indicates you feel you are not in good health    A healthy lifestyle helps keep the body fit and the mind alert. It helps protect you from disease, helps you fight disease, and helps prevent chronic disease (disease that doesn't go away) from getting worse. This is important as you get older and begin to notice twinges in muscles and joints and a decline in the strength and stamina you once took for granted. A healthy lifestyle includes good healthcare, good nutrition, weight control, recreation, and regular exercise. Avoid harmful substances and do what you can to keep safe. Another part of a healthy lifestyle is stay mentally " active and socially involved.    Good healthcare     Have a wellness visit every year.     If you have new symptoms, let us know right away. Don't wait until the next checkup.     Take medicines exactly as prescribed and keep your medicines in a safe place. Tell us if your medicine causes problems.   Healthy diet and weight control     Eat 3 or 4 small, nutritious, low-fat, high-fiber meals a day. Include a variety of fruits, vegetables, and whole-grain foods.     Make sure you get enough calcium in your diet. Calcium, vitamin D, and exercise help prevent osteoporosis (bone thinning).     If you live alone, try eating with others when you can. That way you get a good meal and have company while you eat it.     Try to keep a healthy weight. If you eat more calories than your body uses for energy, it will be stored as fat and you will gain weight.     Recreation   Recreation is not limited to sports and team events. It includes any activity that provides relaxation, interest, enjoyment, and exercise. Recreation provides an outlet for physical, mental, and social energy. It can give a sense of worth and achievement. It can help you stay healthy.    Mental Exercise and Social Involvement  Mental and emotional health is as important as physical health. Keep in touch with friends and family. Stay as active as possible. Continue to learn and challenge yourself.   Things you can do to stay mentally active are:    Learn something new, like a foreign language or musical instrument.     Play SCRABBLE or do crossword puzzles. If you cannot find people to play these games with you at home, you can play them with others on your computer through the Internet.     Join a games club--anything from card games to chess or checkers or lawn bowling.     Start a new hobby.     Go back to school.     Volunteer.     Read.   Keep up with world events.    Exercise for a Healthier Heart     Exercise with a friend. When activity is fun, you're  more likely to stick with it.   You may wonder how you can improve the health of your heart. If you re thinking about exercise, you re on the right track. You don t need to become an athlete, but you do need a certain amount of brisk exercise to help strengthen your heart. If you have been diagnosed with a heart condition, your doctor may recommend exercise to help stabilize your condition. To help make exercise a habit, choose safe, fun activities.  Be sure to check with your healthcare provider before starting an exercise program.  Why exercise?  Exercising regularly offers many healthy rewards. It can help you do all of the following:    Improve your blood cholesterol level to help prevent further heart trouble    Lower your blood pressure to help prevent a stroke or heart attack    Control diabetes, or reduce your risk of getting this disease    Improve your heart and lung function    Reach and maintain a healthy weight    Make your muscles stronger and more limber so you can stay active    Prevent falls and fractures by slowing the loss of bone mass (osteoporosis)    Manage stress better    Reduce your blood pressure    Improve your sense of self and your body image  Exercise tips  Ease into your routine. Set small goals. Then build on them.  Exercise on most days. Aim for a total of 150 or more minutes of moderate to  vigorous intensity activity each week. Consider 40 minutes, 3 to 4 times a week. For best results, activity should last for 40 minutes on average. It is OK to work up to the 40 minute period over time. Examples of moderate-intensity activity is walking 1 mile in 15 minutes or 30 to 45 minutes of yard work.  Step up your daily activity level. Along with your exercise program, try being more active throughout the day. Walk instead of drive. Do more household tasks or yard work.  Choose one or more activities you enjoy. Walking is one of the easiest things you can do. You can also try swimming,  riding a bike, dancing, or taking an exercise class.  Stop exercising and call your doctor if you:    Have chest pain or feel dizzy or lightheaded    Feel burning, tightness, pressure, or heaviness in your chest, neck, shoulders, back, or arms    Have unusual shortness of breath    Have increased joint or muscle pain    Have palpitations or an irregular heartbeat  Date Last Reviewed: 5/1/2016 2000-2019 [employer]. [address] [place] [address]. All rights reserved. This information is not intended as a substitute for professional medical care. Always follow your healthcare professional's instructions.         Patient Education   Alcohol Use     Many people can enjoy a glass of wine or beer without any negative consequences to their health. According to the Centers for Disease Control and Prevention (CDC), having one or fewer drinks per day for women and two or fewer per day for men is considered moderate drinking.     When people drink more than moderately, it can become concerning. Excessive drinking is defined as consuming 15 drinks or more per week for men and 8 drinks or more per week for women. There are various health problems associated with excessive drinking, which include:    Damage to vital organs like the heart, brain, liver and pancreas    Harm to the digestive tract    Weaken the immune system    Higher risk for heart disease and cancer    There are many resources available to people who are addicted to alcohol. A counselor or other health care provider can give you support. So can a , , or rabbi who is trained in substance abuse counseling. Friends and family may also help once you are connected with professionals.           Understanding USDA MyPlate  The USDA (U.S. Department of Agriculture) has guidelines to help you make healthy food choices. These are called MyPlate. MyPlate shows the food groups that make up healthy meals using the image of a place  setting. Before you eat, think about the healthiest choices for what to put onto your plate or into your cup or bowl. To learn more about building a healthy plate, visit www.choosemyplate.gov.    The food groups    Fruits. Any fruit or 100% fruit juice counts as part of the Fruit Group. Fruits may be fresh, canned, frozen, or dried, and may be whole, cut-up, or pureed. Make half your plate fruits and vegetables.    Vegetables. Any vegetable or 100% vegetable juice counts as a member of the Vegetable Group. Vegetables may be fresh, frozen, canned, or dried. They can be served raw or cooked and may be whole, cut-up, or mashed. Make half your plate fruits and vegetables.    Grains. All foods made from grains are part of the Grains Group. These include wheat, rice, oats, cornmeal, and barley such as bread, pasta, oatmeal, cereal, tortillas, and grits. Grains should be no more than a quarter of your plate. At least half of your grains should be whole grains.    Protein. This group includes meat, poultry, seafood, beans and peas, eggs, processed soy products (like tofu), nuts (including nut butters), and seeds. Make protein choices no more than a quarter of your plate. Meat and poultry choices should be lean or low fat.    Dairy. All fluid milk products and foods made from milk that contain calcium, like yogurt and cheese, are part of the Dairy Group. (Foods that have little calcium, such as cream, butter, and cream cheese, are not part of the group.) Most dairy choices should be low-fat or fat-free.    Oils. These are fats that are liquid at room temperature. They include canola, corn, olive, soybean, and sunflower oil. Foods that are mainly oil include mayonnaise, certain salad dressings, and soft margarines. You should have only 5 to 7 teaspoons of oils a day. You probably already get this much from the food you eat.  Date Last Reviewed: 8/1/2017 2000-2019 [employer]. [address] [place] [address]  [place] [address]. All rights reserved. This information is not intended as a substitute for professional medical care. Always follow your healthcare professional's instructions.          Signs of Hearing Loss     Hearing much better with one ear can be a sign of hearing loss.     Hearing loss is a problem shared by many people. In fact, it is one of the most common health conditions, particularly as people age. Most people over age 65 have some hearing loss, and by age 80, almost everyone does. Because hearing loss usually occurs slowly over the years, you may not realize your hearing ability has gotten worse.  Have your hearing checked  Contact your healthcare provider if you:    Have to strain to hear normal conversation    Have to watch other people s faces very carefully to follow what they re saying    Need to ask people to repeat what they ve said    Often misunderstand what people are saying    Turn the volume of the television or radio up so high that others complain    Feel that people are mumbling when they re talking to you    Find that the effort to hear leaves you feeling tired and irritated    Notice, when using the phone, that you hear better with one ear than the other  Date Last Reviewed: 12/1/2016 2000-2019 [employer]. [address] [place] [address], [place] [address]. All rights reserved. This information is not intended as a substitute for professional medical care. Always follow your healthcare professional's instructions.

## 2020-07-31 NOTE — PROGRESS NOTES
The patient was provided with suggestions to help him develop a healthy physical lifestyle.  He is at risk for lack of exercise and has been provided with information to increase physical activity for the benefit of his well-being.  The patient reports that he drinks more than one alcoholic drink per day and sometimes engages in binge or excessive drinking. He was counseled and given information about possible harmful effects of excessive alcohol intake as well as where to get help for alcohol problems.  The patient was counseled and encouraged to consider modifying their diet and eating habits. He was provided with information on recommended healthy diet options.  The patient was provided with written information regarding signs of hearing loss.

## 2020-07-31 NOTE — PROGRESS NOTES
"SUBJECTIVE:   Giacomo Solis is a 70 year old male who presents for Preventive Visit.    Are you in the first 12 months of your Medicare coverage?  No    Healthy Habits:    In general, how would you rate your overall health?  Fair    Frequency of exercise:  None    Do you usually eat at least 4 servings of fruit and vegetables a day, include whole grains    & fiber and avoid regularly eating high fat or \"junk\" foods?  No    Taking medications regularly:  Yes    Barriers to taking medications:  None    Medication side effects:  None    Ability to successfully perform activities of daily living:  No assistance needed    Home Safety:  No safety concerns identified    Hearing Impairment:  Difficulty following a conversation in a noisy restaurant or crowded room, feel that people are mumbling or not speaking clearly, need to ask people to speak up or repeat themselves, difficulty understanding soft or whispered speech and difficulty understanding speech on the telephone    In the past 6 months, have you been bothered by leaking of urine?  No    In general, how would you rate your overall mental or emotional health?  Very good      PHQ-2 Total Score:    Additional concerns today:  No    Do you feel safe in your environment? Yes    Have you ever done Advance Care Planning? (For example, a Health Directive, POLST, or a discussion with a medical provider or your loved ones about your wishes): No, advance care planning information given to patient to review.  Patient plans to discuss their wishes with loved ones or provider.        Fall risk  Fallen 2 or more times in the past year?: No  Any fall with injury in the past year?: No    Cognitive Screening   1) Repeat 3 items (Leader, Season, Table)    2) Clock draw: NORMAL  3) 3 item recall: Recalls 3 objects  Results: 3 items recalled: COGNITIVE IMPAIRMENT LESS LIKELY    Mini-CogTM Copyright SERGEY Watters. Licensed by the author for use in [facility]; reprinted with " permission (issa@[facility]). All rights reserved.      Do you have sleep apnea, excessive snoring or daytime drowsiness?: yes, sleep apnea     Reviewed and updated as needed this visit by clinical staff  Tobacco  Allergies  Meds  Problems  Med Hx  Surg Hx  Fam Hx         Reviewed and updated as needed this visit by Provider  Tobacco  Allergies  Meds  Problems  Med Hx  Surg Hx  Fam Hx        Social History     Tobacco Use     Smoking status: Former Smoker     Packs/day: 0.75     Years: 20.00     Pack years: 15.00     Types: Cigarettes     Last attempt to quit: 2006     Years since quittin.2     Smokeless tobacco: Never Used     Tobacco comment: had quit for 1 year, smoked on and off for years, quit again    Substance Use Topics     Alcohol use: Yes     Alcohol/week: 4.0 standard drinks     Types: 4 Glasses of wine per week     Comment: 2 glasses of wine daily      If you drink alcohol do you typically have >3 drinks per day or >7 drinks per week? Yes      Alcohol Use 2020   Prescreen: >3 drinks/day or >7 drinks/week? -   AUDIT SCORE  7       1.  COPD:  COPD remains stable.  Has not had any recent breathing troubles beyond usual baseline.  Has not any acute respiratory events.  Remains with intermittent cough, mild shortness of breath with overexertion as per usual.  Using medication as directed with reported side effects.     2.  Prior of coronary artery disease as listed in medical history.  No current or recent cardiovascaulr symptoms.   No shortness of breath, no episodes of chest pain/pressure, no dyspnea on exertion, no changes in his abiliy to perform physical exertion or tasks.  Takes the same amount of time to perform similar physical tasks.        3.    Hypertension:  Blood presure remains well controlled at home  Readings outside clinic are within normal limits.  Reviewed last 6 BP readings in chart:  BP Readings from Last 6 Encounters:   20 113/78   19 124/75    08/06/19 118/70   09/21/18 118/72   07/31/18 114/64   06/28/18 125/75     He has not experienced any significant side effects from medicaiotns for hypertension.    NO active cardiac complaints or symptoms with exercise.     4.    The patient has had a history of ongoing obesity.  Reviewed the weigth curves.   Their current BMI is:  Body mass index is 39.28 kg/m .  Reviewed previous attempts at weight loss which have not been successful in producing prolonged weigth loss.   Discussed current eating and exercise habits.     Reviewed weights in chart:  Wt Readings from Last 10 Encounters:   07/31/20 120.7 kg (266 lb)   08/29/19 123.4 kg (272 lb)   08/06/19 123.4 kg (272 lb)   09/21/18 117 kg (257 lb 14.4 oz)   07/31/18 116.2 kg (256 lb 3.2 oz)   06/28/18 115.7 kg (255 lb)   09/18/17 117.5 kg (259 lb 1.6 oz)   03/23/17 118.4 kg (261 lb 1.6 oz)   12/16/16 118.1 kg (260 lb 4.8 oz)   12/12/16 115.5 kg (254 lb 11.2 oz)        5.  The patient has a history of impaired glucose tolerance with regularly elevated blood sugars.    They have not been diagnosed with type II DM or placed on medications for diabetes before.   They deny polyuria, polydipsia.     The patient is obese with a BMI of Body mass index is 39.28 kg/m ..    Review of current labs show:    Lab Results   Component Value Date    A1C 6.1 07/31/2020    A1C 6.3 08/08/2019    A1C 6.0 09/18/2018    A1C 6.1 09/06/2017    A1C 6.4 10/21/2016    A1C 6.4 02/22/2016    A1C 6.4 06/23/2015    A1C 6.2 01/23/2015    A1C 6.1 10/30/2014    A1C 6.2 07/25/2014    A1C 5.9 03/15/2013    A1C 6.1 01/10/2012    A1C 6.1 10/28/2010    A1C 5.9 07/14/2009    A1C 5.7 08/31/2007     @bgl@       Current providers sharing in care for this patient include:   Patient Care Team:  Sinan Stephens MD as PCP - General  Sinan Stephens MD as Assigned PCP    The following health maintenance items are reviewed in Epic and correct as of today:  Health Maintenance   Topic Date Due      COPD ACTION PLAN  1950     ZOSTER IMMUNIZATION (1 of 2) 01/30/2002     COLORECTAL CANCER SCREENING  08/02/2020     INFLUENZA VACCINE (1) 09/01/2020     MEDICARE ANNUAL WELLNESS VISIT  07/31/2021     LIPID  07/31/2021     FALL RISK ASSESSMENT  07/31/2021     ADVANCE CARE PLANNING  07/31/2025     DTAP/TDAP/TD IMMUNIZATION (4 - Td) 09/18/2027     SPIROMETRY  Completed     HEPATITIS C SCREENING  Completed     PHQ-2  Completed     PNEUMOCOCCAL IMMUNIZATION 65+ LOW/MEDIUM RISK  Completed     AORTIC ANEURYSM SCREENING (SYSTEM ASSIGNED)  Completed     IPV IMMUNIZATION  Aged Out     MENINGITIS IMMUNIZATION  Aged Out       Past Medical History:  ---------------------------  Past Medical History:   Diagnosis Date     Atrial fibrillation (H)     paroxysmal     Basal cell carcinoma      COPD (chronic obstructive pulmonary disease) (H) 08/06/2019    FEV1 53 %     Coronary artery disease involving native coronary artery of native heart 11/30/16    NSTEMI (peak troponin 0.05); coronary angiogram showed modeerate nonocclusive disease: LM 30-40 %, LAD 30-40%, CX 30-40 %     Diverticulosis of colon 4/16/12    sigmoid diverticulosis per CT scan; episode acute diverticulitis per CT scan     Heart valve insufficiency      Lipoma of other skin and subcutaneous tissue      Lumbar spinal stenosis 9/1/10    lumbar spinal stenosis at L5     NSTEMI (non-ST elevated myocardial infarction) (H) 11/30/16    NSTEMI (peak troponin 0.05); no acute lesions on angiogram     Obesity (BMI 30-39.9) 7/9/13    BMI 38     Obstructive sleep apnea 10/7/14    per sleep study:  AHI 25/hr, desats to 84%. Supine /hr, only 6 minutes spent supine     Other and unspecified hyperlipidemia      Prediabetes 7/9/13    elevated fasting glucoses     Shingles 11/2017    dx when he was in [place]     Tubular adenoma of colon 8/13/13    3 adenomatous poltyps removed, 4 hyperplastic polyps removed     Unspecified essential hypertension        Past Surgical  History:  ---------------------------  Past Surgical History:   Procedure Laterality Date     COLONOSCOPY  8/13/13    3 adenomatous polyps and 4 hyperplastic polyps removed     HC TOOTH EXTRACTION W/FORCEP  1983    4 wisdom teeth removed withou difficulty       Current Medications:  ---------------------------  Current Outpatient Medications   Medication Sig Dispense Refill     albuterol (PROAIR HFA/PROVENTIL HFA/VENTOLIN HFA) 108 (90 Base) MCG/ACT inhaler Inhale 2 puffs into the lungs every 4 hours as needed for shortness of breath / dyspnea or wheezing 1 Inhaler 11     amLODIPine (NORVASC) 5 MG tablet Take 1 tablet (5 mg) by mouth daily 90 tablet 3     aspirin 81 MG tablet Take 1 tablet by mouth daily.       fluticasone-salmeterol (AIRDUO RESPICLICK) 232-14 MCG/ACT inhaler Inhale 1 puff into the lungs 2 times daily 1 Inhaler 11     furosemide (LASIX) 20 MG tablet TAKE 2 TABLETS(40 MG) BY MOUTH EVERY MORNING 180 tablet 3     lisinopril (ZESTRIL) 40 MG tablet Take 1 tablet (40 mg) by mouth daily 90 tablet 3     metoprolol succinate ER (TOPROL-XL) 50 MG 24 hr tablet Take 1.5 tablets (75 mg) by mouth every morning 135 tablet 3     omeprazole (PRILOSEC) 20 MG DR capsule Take 1 capsule (20 mg) by mouth daily 90 capsule 3     rosuvastatin (CRESTOR) 40 MG tablet Take 1 tablet (40 mg) by mouth daily 90 tablet 3       Allergies:  -------------  Allergies   Allergen Reactions     No Known Allergies        Social History:  -------------------  Social History     Socioeconomic History     Marital status:      Spouse name: Not on file     Number of children: Not on file     Years of education: Not on file     Highest education level: Not on file   Occupational History     Not on file   Social Needs     Financial resource strain: Not on file     Food insecurity     Worry: Not on file     Inability: Not on file     Transportation needs     Medical: Not on file     Non-medical: Not on file   Tobacco Use     Smoking status:  Former Smoker     Packs/day: 0.75     Years: 20.00     Pack years: 15.00     Types: Cigarettes     Last attempt to quit: 2006     Years since quittin.2     Smokeless tobacco: Never Used     Tobacco comment: had quit for 1 year, smoked on and off for years, quit again    Substance and Sexual Activity     Alcohol use: Yes     Alcohol/week: 4.0 standard drinks     Types: 4 Glasses of wine per week     Comment: 2 glasses of wine daily      Drug use: No     Sexual activity: Never   Lifestyle     Physical activity     Days per week: Not on file     Minutes per session: Not on file     Stress: Not on file   Relationships     Social connections     Talks on phone: Not on file     Gets together: Not on file     Attends Buddhist service: Not on file     Active member of club or organization: Not on file     Attends meetings of clubs or organizations: Not on file     Relationship status: Not on file     Intimate partner violence     Fear of current or ex partner: Not on file     Emotionally abused: Not on file     Physically abused: Not on file     Forced sexual activity: Not on file   Other Topics Concern     Parent/sibling w/ CABG, MI or angioplasty before 65F 55M? Not Asked      Service Not Asked     Blood Transfusions Not Asked     Caffeine Concern Not Asked     Occupational Exposure Not Asked     Hobby Hazards Not Asked     Sleep Concern Not Asked     Stress Concern Not Asked     Weight Concern Not Asked     Special Diet No     Back Care Not Asked     Exercise No     Bike Helmet Not Asked     Seat Belt Not Asked     Self-Exams Not Asked   Social History Narrative     Not on file       Family Medical History:  ------------------------------  Family History   Problem Relation Age of Onset     Hypertension Mother         B:1920     Colon Cancer Mother 84     Kidney Cancer Mother 95         age 95     Heart Disease Father         D: 70's  MI     Hypertension Brother         lipids as well     Heart  "Disease Brother         CABG x 3, mitral valve reaplcement     Colon Cancer Brother 68     Respiratory Brother         PRACHI     Myocardial Infarction Brother 69        sudden cardiac death     Diabetes No family hx of      Cerebrovascular Disease No family hx of         Review of Systems  Constitutional, HEENT, cardiovascular, pulmonary, gi and gu systems are negative, except as otherwise noted.    OBJECTIVE:   /78   Pulse 65   Temp 97.3  F (36.3  C) (Temporal)   Ht 1.753 m (5' 9\")   Wt 120.7 kg (266 lb)   SpO2 96%   BMI 39.28 kg/m   Estimated body mass index is 39.28 kg/m  as calculated from the following:    Height as of this encounter: 1.753 m (5' 9\").    Weight as of this encounter: 120.7 kg (266 lb).  Physical Exam  GENERAL alert and no distress  EYES:  Normal sclera,conjunctiva, EOMI  HENT: oral and posterior pharynx without lesions or erythema, facies symmetric  NECK: Neck supple. No LAD, without thyroidmegaly.  RESP: Clear to ausculation bilaterally without wheezes or crackles. Normal BS in all fields.  CV: RRR normal S1S2, with 1/6 to 2/6 systolic murmur, without rubs or gallops.  LYMPH: no cervical lymph adenopathy appreciated  MS: extremities- no gross deformities of the visible extremities noted,   EXT:  Trace lower extremity edema at sock lines  PSYCH: Alert and oriented times 3; speech- coherent  SKIN:  No obvious significant skin lesions on visible portions of face     Diagnostic Test Results:  Labs reviewed in Epic    ASSESSMENT / PLAN:     (Z00.00) Encounter for Medicare annual wellness exam  (primary encounter diagnosis)  Comment: Discussed cardiac disease risk factors and cardiac disease risk factor modification, including diabetes screening, blood pressure screening (and management if indicated), and cholesterol screening.   Reviewed immunzation guidelines, including pneumococcal vaccines, annual influenza, and shingles vaccines.   Discussed routine cancer screenings, including skin " cancer, colon cancer screening for everyone until age 80, prostate cancer screening in men until age 75, mammogram and PAP/pelvic for women until age 75.   Recommended regular dentist visits to care for remaining teeth.   Recommended regular screening for vision and glaucoma.   Recommended safe driving and accident avoidance.   Plan:     (J44.9) Chronic obstructive pulmonary disease, unspecified COPD type (H)  Comment: This condition is currently controlled on the current medical regimen.  Continue current therapy.   Discussed general issues of COPD, including pathophysiology, ways it will affect the pt., when to seek help, reviewed the typical medications (how they are taken, how they help)   Plan: fluticasone-salmeterol (AIRDUO RESPICLICK)         232-14 MCG/ACT inhaler, albuterol (PROAIR         HFA/PROVENTIL HFA/VENTOLIN HFA) 108 (90 Base)         MCG/ACT inhaler            (R60.0) Bilateral lower extremity edema  Comment: This condition is currently controlled on the current medical regimen.  Continue current therapy.   Plan: furosemide (LASIX) 20 MG tablet            (I21.4) NSTEMI (non-ST elevated myocardial infarction) (H)  Comment: Discussed secondary risk factor modification and recommended continuing aggressive management of these items.   Plan: CBC with platelets, Comprehensive metabolic         panel, Lipid panel reflex to direct LDL         Fasting, metoprolol succinate ER (TOPROL-XL) 50        MG 24 hr tablet, rosuvastatin (CRESTOR) 40 MG         tablet            (E78.5) Hyperlipidemia LDL goal <70  Comment: Discussed guidelines recommending a statin cholesterol lowering medication for any patient with either diabetes and/or vascular disease, aiming for a LDL goal under 100 for sure, ideally under 70.    Reviewed statins and their side effects including muscle pain, muscle inflammation, GI upset.  Told the patient to stop the medication in question and to call if any side effects develop.    Recommended CoQ10 200-300 mg at the same time as taking the statin medication to help reduce the chance of muscle side effects from the statin.    Plan: Comprehensive metabolic panel, Lipid panel         reflex to direct LDL Fasting, rosuvastatin         (CRESTOR) 40 MG tablet            (E66.01) Severe obesity (BMI 35.0-39.9) with comorbidity (H)  Comment: The patient's current BMI is: Body mass index is 39.28 kg/m ..  Reviewed their weight patterns.   Discussed obesity as a biological preventable and treatable disease, which is associated with significantly increased risk of many acute and chronic health conditions. Obesity has now been recognized as a chronic disease by the American Medical Association.  Discussed serious co-morbidities associated with obesity including increased risk for hypertension, stroke, coronary artery disease, dyslipidemia, Type II diabetes, depression, sleep apnea, cancers of the colon, breast and endometrium, obstructive sleep apnea, osteoarthritis and female infertility.  Recommended regular aerobic exercise, recommended improved diet aiming at lowering amount of processed foods, lower sugars, and lower wheat products, and moderation carbs and fat in the diet, establishing more regular meal times, always eating breakfast, front loading some of the calories and adding more protein to the diet.     Briefly discussed bariatric surgery as a consideration, especially in patients with BMI greater than or equal to 35 kg/m2 with multiple complications.     Plan: CBC with platelets, Comprehensive metabolic         panel, Hemoglobin A1c, Lipid panel reflex to         direct LDL Fasting            (I10) Benign essential hypertension  Comment: This condition is currently controlled on the current medical regimen.  Continue current therapy.   Discussed current hypertension treatment guidelines, including indications for treatment and treatment options.  Discussed the importance for aggressive  "management of HTN to prevent vascular complications later.  Recommended lower fat, lower carbohydrate, and lower sodium (<2000 mg)diet.  Discussed required intervals for follow up on HTN, lab studies.  Recommened pt. follow their blood pressures outside the clinic to ensure that BPs are remaining within guidelines, and to contact me if the readings are not within guidelines on a regular basis so we can adjust treatment as needed.   Plan: amLODIPine (NORVASC) 5 MG tablet, furosemide         (LASIX) 20 MG tablet, lisinopril (ZESTRIL) 40         MG tablet, metoprolol succinate ER (TOPROL-XL)         50 MG 24 hr tablet            (R73.03) Prediabetes  Comment: Reviewed the labs showing elevated glucose levels.    Discussed \"pre-diabetes\", impaired glucose tolerance, and its part in the dysmetabolic syndrome.    Discussed the inevitable progression of impaired glucose tolerance toward worsening diabetes mellitus and the need for agressive interventions now to delay and prevent this inevitable progression.  Discussed the overall risks that dysmetabolic syndromes/impaired glucose tolerance/syndrome X pose toward increased risks of vascular disease as the main reason for agressive intervention now.  Will add medications for glucose control (i.e. metformin, glitazones, etc), lipid (e.g. statins), and for blood pressure (preferably ARBs and ACE) as indicated.  Will start these as early as needed based other proven ability to delay and modify these risk factors.   Discussed the need for aggressive diet control as the cornerstone of pre-diabetes and diabetes management, emphasizing the reduction of \"simple carbohydrates\" (e.g. Any kind of wheat products (e.g. any bread, any pasta), white rice, noodles, potatoes, snack foods, regular soda, juices (except fresh squeezed), cakes, cookies, candy, etc.) along with regular exercise.       Plan: CBC with platelets, Comprehensive metabolic         panel, Hemoglobin A1c, Lipid panel " reflex to         direct LDL Fasting            (I35.0) Nonrheumatic aortic valve stenosis  Comment: This condition is currently controlled on the current medical regimen.  Continue current therapy.   Plan:     (G47.33,  Z99.89) Obstructive sleep apnea on CPAP  Comment:   Plan: stable with use of regular CPAP    (Z12.5) Screening for prostate cancer  Comment: Discussed prostate cancer screening and PSA blood test.  Discussed that an elevated PSA blood test can be caused by things other than prostate cancer, including infection/inflammation, BPH, and recent sexual activity; and that elevated PSA blood test may require further investigation with a urologist    Plan: PSA, screen            (Z23) Need for pneumococcal vaccination  Comment:   Plan: Pneumococcal vaccine 23 valent PPSV23          (Pneumovax) [87597], ADMIN MEDICARE:         Pneumococcal Vaccine ()            (K21.9) Gastroesophageal reflux disease without esophagitis  Comment: Reviewed the use of PPIs (e.g. Omeprazole, Nexium, etc.) to manage upper GI sx.  Reviewed recenet information suggesting limiting the regular use of PPIs out of concern for side effects (e.g. decreased bone density, increased risk of CDiff, etc.).  Discussed possibly trying a step down therapy such as Ranitidine H2 blocker or only using the PPI as needed for short term use.   After discussion the patient states that they feel the need for the PPI is enough to continue using it.    Plan: omeprazole (PRILOSEC) 20 MG DR capsule               COUNSELING:  Reviewed preventive health counseling, as reflected in patient instructions       Regular exercise       Healthy diet/nutrition       Vision screening       Hearing screening       Dental care       Immunizations    Vaccinated for: Pneumococcal             Aspirin Prophylaxsis       Colon cancer screening       Prostate cancer screening    Estimated body mass index is 39.28 kg/m  as calculated from the following:    Height as of  "this encounter: 1.753 m (5' 9\").    Weight as of this encounter: 120.7 kg (266 lb).    Weight management plan: Lower carb diet, protein diet, more regular physical activity as tolerated.     reports that he quit smoking about 14 years ago. His smoking use included cigarettes. He has a 15.00 pack-year smoking history. He has never used smokeless tobacco.      Appropriate preventive services were discussed with this patient, including applicable screening as appropriate for cardiovascular disease, diabetes, osteopenia/osteoporosis, and glaucoma.  As appropriate for age/gender, discussed screening for colorectal cancer, prostate cancer, breast cancer, and cervical cancer. Checklist reviewing preventive services available has been given to the patient.    Reviewed patients plan of care and provided an AVS. The Basic Care Plan (routine screening as documented in Health Maintenance) for Giacomo meets the Care Plan requirement. This Care Plan has been established and reviewed with the Patient.    Counseling Resources:  ATP IV Guidelines  Pooled Cohorts Equation Calculator  Breast Cancer Risk Calculator  FRAX Risk Assessment  ICSI Preventive Guidelines  Dietary Guidelines for Americans, 2010  USDA's MyPlate  ASA Prophylaxis  Lung CA Screening    Sinan Stephens MD  [facility]    Identified Health Risks:  "

## 2020-08-19 ENCOUNTER — HOSPITAL ENCOUNTER (OUTPATIENT)
Dept: CARDIOLOGY | Facility: CLINIC | Age: 70
Discharge: HOME | End: 2020-08-19
Attending: INTERNAL MEDICINE | Admitting: INTERNAL MEDICINE
Payer: MEDICARE

## 2020-08-19 DIAGNOSIS — E78.5 HYPERLIPIDEMIA LDL GOAL <70: ICD-10-CM

## 2020-08-19 DIAGNOSIS — I35.0 AORTIC VALVE STENOSIS, ETIOLOGY OF CARDIAC VALVE DISEASE UNSPECIFIED: ICD-10-CM

## 2020-08-19 LAB
ALT SERPL W P-5'-P-CCNC: 28 U/L (ref 0–70)
CHOLEST SERPL-MCNC: 142 MG/DL
HDLC SERPL-MCNC: 42 MG/DL
LDLC SERPL CALC-MCNC: 71 MG/DL
NONHDLC SERPL-MCNC: 100 MG/DL
TRIGL SERPL-MCNC: 143 MG/DL

## 2020-08-19 PROCEDURE — 93306 TTE W/DOPPLER COMPLETE: CPT

## 2020-08-19 PROCEDURE — 84460 ALANINE AMINO (ALT) (SGPT): CPT | Performed by: INTERNAL MEDICINE

## 2020-08-19 PROCEDURE — 93306 TTE W/DOPPLER COMPLETE: CPT | Mod: 26 | Performed by: INTERNAL MEDICINE

## 2020-08-19 PROCEDURE — 36415 COLL VENOUS BLD VENIPUNCTURE: CPT | Performed by: INTERNAL MEDICINE

## 2020-08-19 PROCEDURE — 80061 LIPID PANEL: CPT | Performed by: INTERNAL MEDICINE

## 2020-09-01 ENCOUNTER — VIRTUAL VISIT (OUTPATIENT)
Dept: CARDIOLOGY | Facility: CLINIC | Age: 70
End: 2020-09-01
Payer: MEDICARE

## 2020-09-01 DIAGNOSIS — I35.0 AORTIC VALVE STENOSIS, ETIOLOGY OF CARDIAC VALVE DISEASE UNSPECIFIED: ICD-10-CM

## 2020-09-01 DIAGNOSIS — E78.5 HYPERLIPIDEMIA LDL GOAL <70: Primary | ICD-10-CM

## 2020-09-01 DIAGNOSIS — I10 ESSENTIAL HYPERTENSION: ICD-10-CM

## 2020-09-01 PROCEDURE — 99213 OFFICE O/P EST LOW 20 MIN: CPT | Mod: 95 | Performed by: NURSE PRACTITIONER

## 2020-09-01 NOTE — LETTER
"9/1/2020    Sinan Stephens MD  [address] [facility] [address]    RE: Giacomo Solis       Dear Colleague,    I had the pleasure of seeing Giacomo Solis in the [facility].    Giacomo Solis is a 70 year old male who is being evaluated via a billable video visit.      The patient has been notified of following:     \"This video visit will be conducted via a call between you and your physician/provider. We have found that certain health care needs can be provided without the need for an in-person physical exam.  This service lets us provide the care you need with a video conversation.  If a prescription is necessary we can send it directly to your pharmacy.  If lab work is needed we can place an order for that and you can then stop by our lab to have the test done at a later time.    Video visits are billed at different rates depending on your insurance coverage.  Please reach out to your insurance provider with any questions.    If during the course of the call the physician/provider feels a video visit is not appropriate, you will not be charged for this service.\"    Patient has given verbal consent for Video visit? Yes  How would you like to obtain your AVS? MyChart  If you are dropped from the video visit, the video invite should be resent to: Text to cell phone: [phone]  Will anyone else be joining your video visit? No    Vitals - Patient Reported  Systolic (Patient Reported): 122  Diastolic (Patient Reported): 79  Weight (Patient Reported): 120.2 kg (265 lb)  Height (Patient Reported): 175.3 cm (5' 9\")  BMI (Based on Pt Reported Ht/Wt): 39.13      Review Of Systems  Skin: NEGATIVE  Eyes:Ears/Nose/Throat: NEGATIVE  Respiratory: NEGATIVE  Cardiovascular:NEGATIVE  Gastrointestinal: NEGATIVE  Genitourinary:NEGATIVE   Musculoskeletal: NEGATIVE  Neurologic: NEGATIVE  Psychiatric: NEGATIVE  Hematologic/Lymphatic/Immunologic: NEGATIVE  Endocrine:  " NEGATIVE    JOSE Porras    Video-Visit Details    Type of service:  Video Visit    Video Start Time: 11: 08 am  Video End Time: 11:24 AM    Originating Location (pt. Location): Home    Distant Location (provider location):  [facility]     Platform used for Video Visit: SHAINA [facility]    Cardiology Clinic Progress Note  Giacomo Solis MRN# [id number]   YOB: 1950 Age: 70 year old     Primary cardiologist: Dr. Du    Reason for visit: Annual office visit    History of presenting illness:    Giacomo Solis, a pleasant 70 year old patient who has a past medical history significant for aortic valve stenosis, non-ST elevation MI with moderate coronary artery disease that is medically managed, hypertension, history of GI bleed on Xarelto and currently not anticoagulated, paroxysmal atrial fibrillation, hyperlipidemia, ascending aortic dilatation and obstructive sleep apnea.    An echocardiogram was completed on 8/19/2020 that noted his LVEF was 60 to 65% without regional wall motion abnormalities.  His aortic valve stenosis remains moderate with a mean gradient of 25 mmHg, peak velocity of 3.3 m/s and aortic valve area of 1.8 cm .  Overall the study noted no significant change.          Assessment and Plan:     ASSESSMENT:    1. Coronary artery disease    Coronary angiogram from 2016 noted moderate nonocclusive CAD     No ischemia noted on stress echocardiogram from 2018    Medical therapy includes aspirin, statin, beta-blocker and ACE inhibitor    2. Aortic stenosis, moderate and stable    Mean gradient of 25 mmHg, peak velocity of 3.3 m/s and aortic valve area of 1.8 cm     No concerning symptoms    3. Hypertension    Weel controlled    Amlodipine 5 mg daily, lisinopril 40 mg daily, metoprolol XL 75 mg in the evening    4. Paroxysmal atrial fibrillation    History of GI bleed on Xarelto and currently not anticoagulated    Decreased symptoms of  palpitations    5. Hyperlipidemia    Fasting lipid profile from 8/19/2020 noted total cholesterol 142, HDL 42, LDL 71 and triglycerides 143    Rosuvastatin 40 mg daily    6. Ascending aortic dilatation    Aortic root measured 4.1 cm and ascending aorta was 4.0 cm on echo from August 2020    PLAN:     1. Return to clinic in 1 year with repeat echocardiogram             Review of Systems:     Negative unless noted in HPI          Physical Exam:     General Appearance:  No distress, normal body habitus, upright.    ENT/Mouth:  Membranes moist, no nasal discharge or bleeding gums. Normal head shape, no evidence of injury or laceration.    EYES:  No scleral icterus, normal conjunctivae    Neck:  No evidence of thyromegaly. Supple    Chest/Lungs:  No audible wheezing equal chest wall expansion. Non labored breathing. No cough.    Cardiovascular:  No evidence of elevated jugular venous pressure. No evidence of pitting edema bilaterally    Abdomen:  No evidence of abdominal distention. No observed jaundice.    Extremities:  No cyanosis or clubbing noted.    Skin:  No xanthelasma, normal skin collar. No evidence of facial lacerations.    Neurologic:  Normal arm motion bilateral, no tremors. No evidence of focal defect.    Psychiatric:  Alert and oriented x3, calm         Medications:     Current Outpatient Medications   Medication Sig Dispense Refill     albuterol (PROAIR HFA/PROVENTIL HFA/VENTOLIN HFA) 108 (90 Base) MCG/ACT inhaler Inhale 2 puffs into the lungs every 4 hours as needed for shortness of breath / dyspnea or wheezing 1 Inhaler 11     amLODIPine (NORVASC) 5 MG tablet Take 1 tablet (5 mg) by mouth daily 90 tablet 3     aspirin 81 MG tablet Take 1 tablet by mouth daily.       fluticasone-salmeterol (AIRDUO RESPICLICK) 232-14 MCG/ACT inhaler Inhale 1 puff into the lungs 2 times daily 1 Inhaler 11     furosemide (LASIX) 20 MG tablet TAKE 2 TABLETS(40 MG) BY MOUTH EVERY MORNING 180 tablet 3     lisinopril (ZESTRIL)  40 MG tablet Take 1 tablet (40 mg) by mouth daily 90 tablet 3     metoprolol succinate ER (TOPROL-XL) 50 MG 24 hr tablet Take 1.5 tablets (75 mg) by mouth every morning 135 tablet 3     omeprazole (PRILOSEC) 20 MG DR capsule Take 1 capsule (20 mg) by mouth daily 90 capsule 3     rosuvastatin (CRESTOR) 40 MG tablet Take 1 tablet (40 mg) by mouth daily 90 tablet 3       Family History   Problem Relation Age of Onset     Hypertension Mother         B:1920     Colon Cancer Mother 84     Kidney Cancer Mother 95         age 95     Heart Disease Father         D: 70's  MI     Hypertension Brother         lipids as well     Heart Disease Brother         CABG x 3, mitral valve reaplcement     Colon Cancer Brother 68     Respiratory Brother         PRACHI     Myocardial Infarction Brother 69        sudden cardiac death     Diabetes No family hx of      Cerebrovascular Disease No family hx of        Social History     Socioeconomic History     Marital status:      Spouse name: Not on file     Number of children: Not on file     Years of education: Not on file     Highest education level: Not on file   Occupational History     Not on file   Social Needs     Financial resource strain: Not on file     Food insecurity     Worry: Not on file     Inability: Not on file     Transportation needs     Medical: Not on file     Non-medical: Not on file   Tobacco Use     Smoking status: Former Smoker     Packs/day: 0.75     Years: 20.00     Pack years: 15.00     Types: Cigarettes     Last attempt to quit: 2006     Years since quittin.3     Smokeless tobacco: Never Used     Tobacco comment: had quit for 1 year, smoked on and off for years, quit again    Substance and Sexual Activity     Alcohol use: Yes     Alcohol/week: 4.0 standard drinks     Types: 4 Glasses of wine per week     Comment: 2 glasses of wine daily      Drug use: No     Sexual activity: Never   Lifestyle     Physical activity     Days per week: Not on  file     Minutes per session: Not on file     Stress: Not on file   Relationships     Social connections     Talks on phone: Not on file     Gets together: Not on file     Attends Mandaen service: Not on file     Active member of club or organization: Not on file     Attends meetings of clubs or organizations: Not on file     Relationship status: Not on file     Intimate partner violence     Fear of current or ex partner: Not on file     Emotionally abused: Not on file     Physically abused: Not on file     Forced sexual activity: Not on file   Other Topics Concern     Parent/sibling w/ CABG, MI or angioplasty before 65F 55M? Not Asked      Service Not Asked     Blood Transfusions Not Asked     Caffeine Concern Not Asked     Occupational Exposure Not Asked     Hobby Hazards Not Asked     Sleep Concern Not Asked     Stress Concern Not Asked     Weight Concern Not Asked     Special Diet No     Back Care Not Asked     Exercise No     Bike Helmet Not Asked     Seat Belt Not Asked     Self-Exams Not Asked   Social History Narrative     Not on file            Past Medical History:     Past Medical History:   Diagnosis Date     Atrial fibrillation (H)     paroxysmal     Basal cell carcinoma      COPD (chronic obstructive pulmonary disease) (H) 08/06/2019    FEV1 53 %     Coronary artery disease involving native coronary artery of native heart 11/30/16    NSTEMI (peak troponin 0.05); coronary angiogram showed modeerate nonocclusive disease: LM 30-40 %, LAD 30-40%, CX 30-40 %     Diverticulosis of colon 4/16/12    sigmoid diverticulosis per CT scan; episode acute diverticulitis per CT scan     Heart valve insufficiency      Lipoma of other skin and subcutaneous tissue      Lumbar spinal stenosis 9/1/10    lumbar spinal stenosis at L5     NSTEMI (non-ST elevated myocardial infarction) (H) 11/30/16    NSTEMI (peak troponin 0.05); no acute lesions on angiogram     Obesity (BMI 30-39.9) 7/9/13    BMI 38     Obstructive  sleep apnea 10/7/14    per sleep study:  AHI 25/hr, desats to 84%. Supine /hr, only 6 minutes spent supine     Other and unspecified hyperlipidemia      Prediabetes 7/9/13    elevated fasting glucoses     Shingles 11/2017    dx when he was in [place]     Tubular adenoma of colon 8/13/13    3 adenomatous poltyps removed, 4 hyperplastic polyps removed     Unspecified essential hypertension               Past Surgical History:     Past Surgical History:   Procedure Laterality Date     COLONOSCOPY  8/13/13    3 adenomatous polyps and 4 hyperplastic polyps removed     HC TOOTH EXTRACTION W/FORCEP  1983    4 wisdom teeth removed withou difficulty              Allergies:   No known allergies       Data:   All laboratory data reviewed:    Recent Labs   Lab Test 08/19/20  0818 07/31/20  1008 08/08/19  0852  09/06/17  0806  11/05/13  1000   LDL 71 70 65   < > 53   < >  --    HDL 42 45 34*   < > 45   < >  --    NHDL 100 84 91   < > 95   < >  --    CHOL 142 129 125   < > 140   < >  --    TRIG 143 71 130   < > 208*   < >  --    TSH  --   --   --   --  1.88  --  1.19    < > = values in this interval not displayed.       Lab Results   Component Value Date    WBC 6.0 07/31/2020    RBC 5.27 07/31/2020    HGB 15.0 07/31/2020    HCT 46.2 07/31/2020    MCV 88 07/31/2020    MCH 28.5 07/31/2020    MCHC 32.5 07/31/2020    RDW 14.3 07/31/2020     07/31/2020       Lab Results   Component Value Date     07/31/2020    POTASSIUM 4.2 07/31/2020    CHLORIDE 106 07/31/2020    CO2 27 07/31/2020    ANIONGAP 6 07/31/2020     (H) 07/31/2020    BUN 14 07/31/2020    CR 0.77 07/31/2020    GFRESTIMATED >90 07/31/2020    GFRESTBLACK >90 07/31/2020    LASHAY 8.5 07/31/2020      Lab Results   Component Value Date    AST 18 07/31/2020    ALT 28 08/19/2020       Lab Results   Component Value Date    A1C 6.1 (H) 07/31/2020       Lab Results   Component Value Date    INR 0.96 11/05/2013       HILARIO JOSE APRN CNP  [facility]  Pager: [phone]  RN phone: [phone]      Thank you for allowing me to participate in the care of your patient.    Sincerely,     SHAINA CHAMBERS [facility]

## 2020-09-01 NOTE — PROGRESS NOTES
"Giacomo Solis is a 70 year old male who is being evaluated via a billable video visit.      The patient has been notified of following:     \"This video visit will be conducted via a call between you and your physician/provider. We have found that certain health care needs can be provided without the need for an in-person physical exam.  This service lets us provide the care you need with a video conversation.  If a prescription is necessary we can send it directly to your pharmacy.  If lab work is needed we can place an order for that and you can then stop by our lab to have the test done at a later time.    Video visits are billed at different rates depending on your insurance coverage.  Please reach out to your insurance provider with any questions.    If during the course of the call the physician/provider feels a video visit is not appropriate, you will not be charged for this service.\"    Patient has given verbal consent for Video visit? Yes  How would you like to obtain your AVS? MyChart  If you are dropped from the video visit, the video invite should be resent to: Text to cell phone: [phone]  Will anyone else be joining your video visit? No    Vitals - Patient Reported  Systolic (Patient Reported): 122  Diastolic (Patient Reported): 79  Weight (Patient Reported): 120.2 kg (265 lb)  Height (Patient Reported): 175.3 cm (5' 9\")  BMI (Based on Pt Reported Ht/Wt): 39.13      Review Of Systems  Skin: NEGATIVE  Eyes:Ears/Nose/Throat: NEGATIVE  Respiratory: NEGATIVE  Cardiovascular:NEGATIVE  Gastrointestinal: NEGATIVE  Genitourinary:NEGATIVE   Musculoskeletal: NEGATIVE  Neurologic: NEGATIVE  Psychiatric: NEGATIVE  Hematologic/Lymphatic/Immunologic: NEGATIVE  Endocrine:  NEGATIVE    JOSE Porras    Video-Visit Details    Type of service:  Video Visit    Video Start Time: 11: 08 am  Video End Time: 11:24 AM    Originating Location (pt. Location): Home    Distant Location (provider location):  [facility]     Platform used for Video Visit: SHAINA [facility]    Cardiology Clinic Progress Note  Giacomo Solis MRN# [id number]   YOB: 1950 Age: 70 year old     Primary cardiologist: Dr. Du    Reason for visit: Annual office visit    History of presenting illness:    Giacomo Solis, a pleasant 70 year old patient who has a past medical history significant for aortic valve stenosis, non-ST elevation MI with moderate coronary artery disease that is medically managed, hypertension, history of GI bleed on Xarelto and currently not anticoagulated, paroxysmal atrial fibrillation, hyperlipidemia, ascending aortic dilatation and obstructive sleep apnea.    An echocardiogram was completed on 8/19/2020 that noted his LVEF was 60 to 65% without regional wall motion abnormalities.  His aortic valve stenosis remains moderate with a mean gradient of 25 mmHg, peak velocity of 3.3 m/s and aortic valve area of 1.8 cm .  Overall the study noted no significant change.          Assessment and Plan:     ASSESSMENT:    1. Coronary artery disease    Coronary angiogram from 2016 noted moderate nonocclusive CAD     No ischemia noted on stress echocardiogram from 2018    Medical therapy includes aspirin, statin, beta-blocker and ACE inhibitor    2. Aortic stenosis, moderate and stable    Mean gradient of 25 mmHg, peak velocity of 3.3 m/s and aortic valve area of 1.8 cm     No concerning symptoms    3. Hypertension    Weel controlled    Amlodipine 5 mg daily, lisinopril 40 mg daily, metoprolol XL 75 mg in the evening    4. Paroxysmal atrial fibrillation    History of GI bleed on Xarelto and currently not anticoagulated    Decreased symptoms of palpitations    5. Hyperlipidemia    Fasting lipid profile from 8/19/2020 noted total cholesterol 142, HDL 42, LDL 71 and triglycerides 143    Rosuvastatin 40 mg daily    6. Ascending aortic dilatation    Aortic root measured 4.1 cm and  ascending aorta was 4.0 cm on echo from August 2020    PLAN:     1. Return to clinic in 1 year with repeat echocardiogram             Review of Systems:     Negative unless noted in HPI          Physical Exam:     General Appearance:  No distress, normal body habitus, upright.    ENT/Mouth:  Membranes moist, no nasal discharge or bleeding gums. Normal head shape, no evidence of injury or laceration.    EYES:  No scleral icterus, normal conjunctivae    Neck:  No evidence of thyromegaly. Supple    Chest/Lungs:  No audible wheezing equal chest wall expansion. Non labored breathing. No cough.    Cardiovascular:  No evidence of elevated jugular venous pressure. No evidence of pitting edema bilaterally    Abdomen:  No evidence of abdominal distention. No observed jaundice.    Extremities:  No cyanosis or clubbing noted.    Skin:  No xanthelasma, normal skin collar. No evidence of facial lacerations.    Neurologic:  Normal arm motion bilateral, no tremors. No evidence of focal defect.    Psychiatric:  Alert and oriented x3, calm         Medications:     Current Outpatient Medications   Medication Sig Dispense Refill     albuterol (PROAIR HFA/PROVENTIL HFA/VENTOLIN HFA) 108 (90 Base) MCG/ACT inhaler Inhale 2 puffs into the lungs every 4 hours as needed for shortness of breath / dyspnea or wheezing 1 Inhaler 11     amLODIPine (NORVASC) 5 MG tablet Take 1 tablet (5 mg) by mouth daily 90 tablet 3     aspirin 81 MG tablet Take 1 tablet by mouth daily.       fluticasone-salmeterol (AIRDUO RESPICLICK) 232-14 MCG/ACT inhaler Inhale 1 puff into the lungs 2 times daily 1 Inhaler 11     furosemide (LASIX) 20 MG tablet TAKE 2 TABLETS(40 MG) BY MOUTH EVERY MORNING 180 tablet 3     lisinopril (ZESTRIL) 40 MG tablet Take 1 tablet (40 mg) by mouth daily 90 tablet 3     metoprolol succinate ER (TOPROL-XL) 50 MG 24 hr tablet Take 1.5 tablets (75 mg) by mouth every morning 135 tablet 3     omeprazole (PRILOSEC) 20 MG DR capsule Take 1  capsule (20 mg) by mouth daily 90 capsule 3     rosuvastatin (CRESTOR) 40 MG tablet Take 1 tablet (40 mg) by mouth daily 90 tablet 3       Family History   Problem Relation Age of Onset     Hypertension Mother         B:1920     Colon Cancer Mother 84     Kidney Cancer Mother 95         age 95     Heart Disease Father         D: 70's  MI     Hypertension Brother         lipids as well     Heart Disease Brother         CABG x 3, mitral valve reaplcement     Colon Cancer Brother 68     Respiratory Brother         PRACHI     Myocardial Infarction Brother 69        sudden cardiac death     Diabetes No family hx of      Cerebrovascular Disease No family hx of        Social History     Socioeconomic History     Marital status:      Spouse name: Not on file     Number of children: Not on file     Years of education: Not on file     Highest education level: Not on file   Occupational History     Not on file   Social Needs     Financial resource strain: Not on file     Food insecurity     Worry: Not on file     Inability: Not on file     Transportation needs     Medical: Not on file     Non-medical: Not on file   Tobacco Use     Smoking status: Former Smoker     Packs/day: 0.75     Years: 20.00     Pack years: 15.00     Types: Cigarettes     Last attempt to quit: 2006     Years since quittin.3     Smokeless tobacco: Never Used     Tobacco comment: had quit for 1 year, smoked on and off for years, quit again    Substance and Sexual Activity     Alcohol use: Yes     Alcohol/week: 4.0 standard drinks     Types: 4 Glasses of wine per week     Comment: 2 glasses of wine daily      Drug use: No     Sexual activity: Never   Lifestyle     Physical activity     Days per week: Not on file     Minutes per session: Not on file     Stress: Not on file   Relationships     Social connections     Talks on phone: Not on file     Gets together: Not on file     Attends Episcopal service: Not on file     Active member of  club or organization: Not on file     Attends meetings of clubs or organizations: Not on file     Relationship status: Not on file     Intimate partner violence     Fear of current or ex partner: Not on file     Emotionally abused: Not on file     Physically abused: Not on file     Forced sexual activity: Not on file   Other Topics Concern     Parent/sibling w/ CABG, MI or angioplasty before 65F 55M? Not Asked      Service Not Asked     Blood Transfusions Not Asked     Caffeine Concern Not Asked     Occupational Exposure Not Asked     Hobby Hazards Not Asked     Sleep Concern Not Asked     Stress Concern Not Asked     Weight Concern Not Asked     Special Diet No     Back Care Not Asked     Exercise No     Bike Helmet Not Asked     Seat Belt Not Asked     Self-Exams Not Asked   Social History Narrative     Not on file            Past Medical History:     Past Medical History:   Diagnosis Date     Atrial fibrillation (H)     paroxysmal     Basal cell carcinoma      COPD (chronic obstructive pulmonary disease) (H) 08/06/2019    FEV1 53 %     Coronary artery disease involving native coronary artery of native heart 11/30/16    NSTEMI (peak troponin 0.05); coronary angiogram showed modeerate nonocclusive disease: LM 30-40 %, LAD 30-40%, CX 30-40 %     Diverticulosis of colon 4/16/12    sigmoid diverticulosis per CT scan; episode acute diverticulitis per CT scan     Heart valve insufficiency      Lipoma of other skin and subcutaneous tissue      Lumbar spinal stenosis 9/1/10    lumbar spinal stenosis at L5     NSTEMI (non-ST elevated myocardial infarction) (H) 11/30/16    NSTEMI (peak troponin 0.05); no acute lesions on angiogram     Obesity (BMI 30-39.9) 7/9/13    BMI 38     Obstructive sleep apnea 10/7/14    per sleep study:  AHI 25/hr, desats to 84%. Supine /hr, only 6 minutes spent supine     Other and unspecified hyperlipidemia      Prediabetes 7/9/13    elevated fasting glucoses     Shingles 11/2017     dx when he was in [place]     Tubular adenoma of colon 8/13/13    3 adenomatous poltyps removed, 4 hyperplastic polyps removed     Unspecified essential hypertension               Past Surgical History:     Past Surgical History:   Procedure Laterality Date     COLONOSCOPY  8/13/13    3 adenomatous polyps and 4 hyperplastic polyps removed     HC TOOTH EXTRACTION W/FORCEP  1983    4 wisdom teeth removed withou difficulty              Allergies:   No known allergies       Data:   All laboratory data reviewed:    Recent Labs   Lab Test 08/19/20  0818 07/31/20  1008 08/08/19  0852  09/06/17  0806  11/05/13  1000   LDL 71 70 65   < > 53   < >  --    HDL 42 45 34*   < > 45   < >  --    NHDL 100 84 91   < > 95   < >  --    CHOL 142 129 125   < > 140   < >  --    TRIG 143 71 130   < > 208*   < >  --    TSH  --   --   --   --  1.88  --  1.19    < > = values in this interval not displayed.       Lab Results   Component Value Date    WBC 6.0 07/31/2020    RBC 5.27 07/31/2020    HGB 15.0 07/31/2020    HCT 46.2 07/31/2020    MCV 88 07/31/2020    MCH 28.5 07/31/2020    MCHC 32.5 07/31/2020    RDW 14.3 07/31/2020     07/31/2020       Lab Results   Component Value Date     07/31/2020    POTASSIUM 4.2 07/31/2020    CHLORIDE 106 07/31/2020    CO2 27 07/31/2020    ANIONGAP 6 07/31/2020     (H) 07/31/2020    BUN 14 07/31/2020    CR 0.77 07/31/2020    GFRESTIMATED >90 07/31/2020    GFRESTBLACK >90 07/31/2020    LASHAY 8.5 07/31/2020      Lab Results   Component Value Date    AST 18 07/31/2020    ALT 28 08/19/2020       Lab Results   Component Value Date    A1C 6.1 (H) 07/31/2020       Lab Results   Component Value Date    INR 0.96 11/05/2013       SHAINA CHAMBERS [facility]  Pager: [phone]  RN phone: [phone]

## 2021-01-15 ENCOUNTER — HEALTH MAINTENANCE LETTER (OUTPATIENT)
Age: 71
End: 2021-01-15

## 2021-03-05 ENCOUNTER — MYC MEDICAL ADVICE (OUTPATIENT)
Dept: INTERNAL MEDICINE | Facility: CLINIC | Age: 71
End: 2021-03-05

## 2021-03-08 NOTE — TELEPHONE ENCOUNTER
Added COIVD vaccines x2 to patient's immunization record.    Bety Canales, MSN, RN  [facility]

## 2021-04-06 ENCOUNTER — MYC MEDICAL ADVICE (OUTPATIENT)
Dept: INTERNAL MEDICINE | Facility: CLINIC | Age: 71
End: 2021-04-06

## 2021-05-11 ENCOUNTER — TRANSFERRED RECORDS (OUTPATIENT)
Dept: HEALTH INFORMATION MANAGEMENT | Facility: CLINIC | Age: 71
End: 2021-05-11

## 2021-06-27 ENCOUNTER — ANCILLARY PROCEDURE (OUTPATIENT)
Dept: GENERAL RADIOLOGY | Facility: CLINIC | Age: 71
End: 2021-06-27
Attending: FAMILY MEDICINE
Payer: MEDICARE

## 2021-06-27 ENCOUNTER — OFFICE VISIT (OUTPATIENT)
Dept: URGENT CARE | Facility: URGENT CARE | Age: 71
End: 2021-06-27
Payer: MEDICARE

## 2021-06-27 VITALS
HEART RATE: 94 BPM | OXYGEN SATURATION: 98 % | SYSTOLIC BLOOD PRESSURE: 122 MMHG | BODY MASS INDEX: 39.87 KG/M2 | DIASTOLIC BLOOD PRESSURE: 80 MMHG | RESPIRATION RATE: 20 BRPM | WEIGHT: 270 LBS | TEMPERATURE: 98.2 F

## 2021-06-27 DIAGNOSIS — I48.19 PERSISTENT ATRIAL FIBRILLATION (H): ICD-10-CM

## 2021-06-27 DIAGNOSIS — R07.89 CHEST HEAVINESS: Primary | ICD-10-CM

## 2021-06-27 LAB
ALBUMIN SERPL-MCNC: 3.8 G/DL (ref 3.4–5)
ALP SERPL-CCNC: 78 U/L (ref 40–150)
ALT SERPL W P-5'-P-CCNC: 28 U/L (ref 0–70)
ANION GAP SERPL CALCULATED.3IONS-SCNC: 5 MMOL/L (ref 3–14)
AST SERPL W P-5'-P-CCNC: 18 U/L (ref 0–45)
BASOPHILS # BLD AUTO: 0 10E9/L (ref 0–0.2)
BASOPHILS NFR BLD AUTO: 0.5 %
BILIRUB SERPL-MCNC: 0.9 MG/DL (ref 0.2–1.3)
BUN SERPL-MCNC: 12 MG/DL (ref 7–30)
CALCIUM SERPL-MCNC: 8.7 MG/DL (ref 8.5–10.1)
CHLORIDE SERPL-SCNC: 104 MMOL/L (ref 94–109)
CO2 SERPL-SCNC: 28 MMOL/L (ref 20–32)
CREAT SERPL-MCNC: 0.83 MG/DL (ref 0.66–1.25)
D DIMER PPP FEU-MCNC: 0.5 UG/ML FEU (ref 0–0.5)
DIFFERENTIAL METHOD BLD: NORMAL
EOSINOPHIL # BLD AUTO: 0.1 10E9/L (ref 0–0.7)
EOSINOPHIL NFR BLD AUTO: 2.5 %
ERYTHROCYTE [DISTWIDTH] IN BLOOD BY AUTOMATED COUNT: 14.6 % (ref 10–15)
GFR SERPL CREATININE-BSD FRML MDRD: 89 ML/MIN/{1.73_M2}
GLUCOSE SERPL-MCNC: 89 MG/DL (ref 70–99)
HCT VFR BLD AUTO: 48.8 % (ref 40–53)
HGB BLD-MCNC: 16 G/DL (ref 13.3–17.7)
LYMPHOCYTES # BLD AUTO: 0.9 10E9/L (ref 0.8–5.3)
LYMPHOCYTES NFR BLD AUTO: 16 %
MCH RBC QN AUTO: 28.4 PG (ref 26.5–33)
MCHC RBC AUTO-ENTMCNC: 32.8 G/DL (ref 31.5–36.5)
MCV RBC AUTO: 87 FL (ref 78–100)
MONOCYTES # BLD AUTO: 0.5 10E9/L (ref 0–1.3)
MONOCYTES NFR BLD AUTO: 9 %
NEUTROPHILS # BLD AUTO: 4.1 10E9/L (ref 1.6–8.3)
NEUTROPHILS NFR BLD AUTO: 72 %
PLATELET # BLD AUTO: 210 10E9/L (ref 150–450)
POTASSIUM SERPL-SCNC: 3.8 MMOL/L (ref 3.4–5.3)
PROT SERPL-MCNC: 7.7 G/DL (ref 6.8–8.8)
RBC # BLD AUTO: 5.63 10E12/L (ref 4.4–5.9)
SODIUM SERPL-SCNC: 137 MMOL/L (ref 133–144)
TROPONIN I SERPL-MCNC: 0.04 UG/L (ref 0–0.04)
WBC # BLD AUTO: 5.7 10E9/L (ref 4–11)

## 2021-06-27 PROCEDURE — 36415 COLL VENOUS BLD VENIPUNCTURE: CPT | Performed by: FAMILY MEDICINE

## 2021-06-27 PROCEDURE — 85025 COMPLETE CBC W/AUTO DIFF WBC: CPT | Performed by: FAMILY MEDICINE

## 2021-06-27 PROCEDURE — 93000 ELECTROCARDIOGRAM COMPLETE: CPT | Performed by: FAMILY MEDICINE

## 2021-06-27 PROCEDURE — 71046 X-RAY EXAM CHEST 2 VIEWS: CPT | Performed by: RADIOLOGY

## 2021-06-27 PROCEDURE — 84484 ASSAY OF TROPONIN QUANT: CPT | Performed by: FAMILY MEDICINE

## 2021-06-27 PROCEDURE — 85379 FIBRIN DEGRADATION QUANT: CPT | Performed by: FAMILY MEDICINE

## 2021-06-27 PROCEDURE — 83690 ASSAY OF LIPASE: CPT | Performed by: FAMILY MEDICINE

## 2021-06-27 PROCEDURE — 99215 OFFICE O/P EST HI 40 MIN: CPT | Performed by: FAMILY MEDICINE

## 2021-06-27 PROCEDURE — 80053 COMPREHEN METABOLIC PANEL: CPT | Performed by: FAMILY MEDICINE

## 2021-06-27 NOTE — PATIENT INSTRUCTIONS
Patient Education     Understanding Atrial Fibrillation     An arrhythmia is any problem with the speed or pattern of the heartbeat. Atrial fibrillation (AFib) is the most common type of arrhythmia. It causes fast, chaotic electrical signals in the atria. This makes it hard for the heart to work as it should. It also affects how much blood your heart can pump out to the body.  AFib may occur once in a while and go away on its own. Or it may continue for longer periods and need treatment.  AFib can lead to serious problems, such as stroke. Your healthcare provider will need to monitor and manage it.    What happens during atrial fibrillation?   The heart has an electrical system that sends signals to control the heartbeat. As signals move through the heart, they tell the heart s upper chambers (atria) and lower chambers (ventricles) when to squeeze (contract) and relax. This lets blood move through the heart and out to the body and lungs.  With AFib, the atria receive abnormal signals. This causes them to contract in a fast and irregular way, and out of sync with the ventricles. When this happens, the atria also have a harder time moving blood into the ventricles. Blood may then pool in the atria. This increases the risk for blood clots and stroke. The ventricles also may contract too quickly and irregularly. As a result, they may not pump blood to the body and lungs as well as they should. This can weaken the heart muscle over time and cause heart failure.  What causes atrial fibrillation?  AFib is more common in older adults. It has many possible causes:    Coronary artery disease    Heart valve disease    Heart attack    Heart surgery    High blood pressure    Thyroid disease    Diabetes    Lung disease    Sleep apnea    Heavy alcohol use  In some cases of AFib, doctors don't know the cause.  What are the symptoms of atrial fibrillation?  AFib may not cause symptoms. If symptoms do occur, they may include:    A  fast, pounding, irregular heartbeat    Shortness of breath    Tiredness    Dizziness or fainting    Chest pain  How is atrial fibrillation treated?  Treatments for AFib can include any of the options below.    Medicines. You may be prescribed:  ? Heart rate medicines to help slow down the heartbeat  ? Heart rhythm medicines to help the heart beat more regularly  ? Blood thinners or anti-clotting medicines to help reduce the risk for blood clots and stroke.    Left atrial appendage closure. Your healthcare provider may advise this device to prevent stroke. You may need if you are at high risk for stroke but have problems taking blood-thinner (anticoagulant) medicines. The device is placed in the part of the heart where most clots form. This area is called the left atrial appendage (LORIE). It's a pouch-like structure in the muscle wall of the left atrium. The device closes off the LORIE to prevent clots moving from the heart to the brain and causing a stroke.    Electrical cardioversion. Your healthcare provider uses special pads or paddles to send one or more brief electrical shocks to the heart. This can help reset the heartbeat to normal.    Ablation. Long, thin tubes (catheters) are threaded through a blood vessel to the heart. There, the catheters send out hot or cold energy to the areas causing the abnormal signals. This energy destroys the problem tissue or cells. This improves the chances that your heart will stay in normal rhythm without using medicines. If your heart rate and rhythm can t be controlled, you may need ablation and a pacemaker. These will help control the heart rate and regularity of the heartbeat.    Surgery. During surgery, your healthcare provider may use different methods to create scar tissue in the areas of the heart causing the abnormal signals. The scar tissue disrupts the abnormal signals and may stop AFib from occurring.    Hybrid surgical-catheter ablation for AFib. This treatment is  used for people with AFib that continues or is hard to treat.. It combines surgery with a catheter ablation. During the surgery, the surgeon makes small cuts (incisions) between the ribs in the chest or in the abdomen near the sternum. The surgeon puts a scope through the incisions to get to the backside of the heart. The catheter portion of the procedure is done by putting a catheter into a vein in the groin. The catheter is guided to the inside of the heart. Using the catheter, radiofrequency ablation is done to destroy the tissue inside the heart that is causing the AFib. Using both of these approaches may work better to block the abnormal electrical signals and be a more permanent treatment for persistent AFib.  What are possible complications of atrial fibrillation?  Complications can include:    Blood clots    Stroke    Heart failure. This problem occurs when the heart muscle weakens so much that it can no longer pump blood well.  When should I call my healthcare provider?  Call your healthcare provider right away if you have any of these:    Symptoms that don t get better with treatment, or get worse    New symptoms  [employer] last reviewed this educational content on 4/1/2019 2000-2021 [employer]. All rights reserved. This information is not intended as a substitute for professional medical care. Always follow your healthcare professional's instructions.

## 2021-06-28 LAB — LIPASE SERPL-CCNC: 57 U/L (ref 73–393)

## 2021-06-28 NOTE — PROGRESS NOTES
ASSESS/ PLAN    Chest heaviness     - EKG 12-lead complete w/read - Clinics  - XR Chest 2 Views  - Troponin I  - CBC with platelets differential  - Comprehensive metabolic panel  - Lipase  - D dimer quantitative     The multiple causes of chest pain were considered, including myocardial infarction, arrhythmia, aortic dissection,  Pneumonia, pneumothorax, pleurisy,  Pulmonary embolism,  GERD,  Referred pain from the stomach, pancreas, gallbladder or liver, hiatal hernia, costochondritis, chest muscle strain       Exam and testing showed:  Physical exam localized the pain (heaviness) to the midsternal region .  Has no chest wall pain,   no epigastric/  Upper abdominal pain,  no CVA pain.  no pain of the costochondral region  Heart sounds- irregularly irregular  with rate about 100, no significant tachycardia or bradycardia-  He has atrial fibrillation-  Has had past atrial fibrillation and is taking Metoprolol for rate control  No signs of pneumonia, fluid overload, enlarged heart or pneumothorax on CXR.  No rib fracture noted  No lower extremity edema, calf pain, palpable cords    EKG:  No acute ST ischemic changes.  ,  Atrial fibrillation rate 98  CXR shows no infiltrates, no pneumothorax , no cardiomegally  Labs show: no elevation of inflammation markers of the liver, gallbladder, pancreas  Blood count normal,   no anemia  Troponin was normal-  No signs of cardiac ischemia-- at 10 hours from start of symptoms troponin should reflect if ischemia is present  D-dimer was in the normal range-  Low risk for DVT/ PE       Incorporating the results of history, physical exam, imaging and other testing, the most likely cause of the patient's symptoms is mild tachycardia with  Atrial fibrillation-  But no signs of ischemia     Patient was reassured that no signs of acute cardiopulmonary pathology were identified, but    If symptoms are recurring or worsening, or increased shortness of breath, patient should present to the ER  "for further evaluation    He will call his cardiologist in the am to review if any further evaluation is suggested     Acetaminophen/ Ibuprofen for pain       Persistent atrial fibrillation (H)    Atrial fibrillation is not new for him, but he has mild tachycardia,  Rate .  I suggested taking 1/2 of his metoprolol when he gets home to slow his cardiac rate-  Which will help improve efficiency of his heart's circulatory effort.  Discussed that slowing his rate may help with conversion back to normal sinus.    Will call cardiologist tomorrow to review UC lab/ evaluation results  --------------------------------------------------------------------------------------------------------------------------    SUBJECTIVE:    Chief Complaint   Patient presents with     Breathing Problem     yesterday, and then today started having \"a heaviness to chest\"       Giacomo Solis is a 71 year old male who presents to the office with the CC of chest  Heaviness.  He was outdoors yesterday doing lawn work and felt some shortness of breath.  Today he woke from sleep with chest heaviness-  He had some wheezing using his CPAP.  He took 3 baby aspirin this am  He hs no shortness of breath now , no SOB today.  Patient complains of chest pressure/discomfort.  Concerned about elevated BP  No pain .  The pressure is characterized as localized heaviness located substernal region with radiation to none. Symptoms began 10 hour(s) ago, gradual onset, still present and constant  Pain is associated with-  Patient denies: SOB, diaphoresis, fever, nausea, vomiting, palpitations, lightheadedness, belching, indigestion, diarrhea, bloating/edema and cough.  Pain is exacerbated by no known provoking events.  Pain is relieved by none.  Cardiac risk factors: abnormal lipids,pre diabetes mellitus, hypertension, obesity, sedentary life style, past MI, paroxysmal atrial fib,  Aortic valve stenosis,  COPD,  Obstructive sleep apnea    PMH:  Past Medical " History:   Diagnosis Date     Atrial fibrillation (H)     paroxysmal     Basal cell carcinoma      COPD (chronic obstructive pulmonary disease) (H) 08/06/2019    FEV1 53 %     Coronary artery disease involving native coronary artery of native heart 11/30/16    NSTEMI (peak troponin 0.05); coronary angiogram showed modeerate nonocclusive disease: LM 30-40 %, LAD 30-40%, CX 30-40 %     Diverticulosis of colon 4/16/12    sigmoid diverticulosis per CT scan; episode acute diverticulitis per CT scan     Heart valve insufficiency      Lipoma of other skin and subcutaneous tissue      Lumbar spinal stenosis 9/1/10    lumbar spinal stenosis at L5     NSTEMI (non-ST elevated myocardial infarction) (H) 11/30/16    NSTEMI (peak troponin 0.05); no acute lesions on angiogram     Obesity (BMI 30-39.9) 7/9/13    BMI 38     Obstructive sleep apnea 10/7/14    per sleep study:  AHI 25/hr, desats to 84%. Supine /hr, only 6 minutes spent supine     Other and unspecified hyperlipidemia      Prediabetes 7/9/13    elevated fasting glucoses     Shingles 11/2017    dx when he was in [place]     Tubular adenoma of colon 8/13/13    3 adenomatous poltyps removed, 4 hyperplastic polyps removed     Unspecified essential hypertension      Patient Active Problem List   Diagnosis     Benign essential hypertension     Severe obesity (BMI 35.0-39.9) with comorbidity (H)     Allergic rhinitis     Lumbar spinal stenosis     Mixed hyperlipidemia     Advanced directives, counseling/discussion     Diverticulosis of colon     Severe obesity (BMI 35.0-35.9 with comorbidity) (H)     Prediabetes     Tubular adenoma of colon     Paroxysmal atrial fibrillation (H)     PRACHI (obstructive sleep apnea)     Obstructive sleep apnea on CPAP     Gastroesophageal reflux disease without esophagitis     NSTEMI (non-ST elevated myocardial infarction) (H)     Coronary artery disease involving native coronary artery of native heart without angina pectoris      Nonrheumatic aortic valve stenosis     Ascending aorta dilatation (H)     COPD (chronic obstructive pulmonary disease) (H)       ALLERGIES:  No known allergies    MEDs  albuterol (PROAIR HFA/PROVENTIL HFA/VENTOLIN HFA) 108 (90 Base) MCG/ACT inhaler, Inhale 2 puffs into the lungs every 4 hours as needed for shortness of breath / dyspnea or wheezing  amLODIPine (NORVASC) 5 MG tablet, Take 1 tablet (5 mg) by mouth daily  aspirin 81 MG tablet, Take 1 tablet by mouth daily.  fluticasone-salmeterol (AIRDUO RESPICLICK) 232-14 MCG/ACT inhaler, Inhale 1 puff into the lungs 2 times daily  furosemide (LASIX) 20 MG tablet, TAKE 2 TABLETS(40 MG) BY MOUTH EVERY MORNING  lisinopril (ZESTRIL) 40 MG tablet, Take 1 tablet (40 mg) by mouth daily  metoprolol succinate ER (TOPROL-XL) 50 MG 24 hr tablet, Take 1.5 tablets (75 mg) by mouth every morning  omeprazole (PRILOSEC) 20 MG DR capsule, Take 1 capsule (20 mg) by mouth daily  rosuvastatin (CRESTOR) 40 MG tablet, Take 1 tablet (40 mg) by mouth daily    No current facility-administered medications on file prior to visit.       Social History     Tobacco Use     Smoking status: Former Smoker     Packs/day: 0.75     Years: 20.00     Pack years: 15.00     Types: Cigarettes     Quit date: 2006     Years since quitting: 15.1     Smokeless tobacco: Never Used     Tobacco comment: had quit for 1 year, smoked on and off for years, quit again    Substance Use Topics     Alcohol use: Yes     Alcohol/week: 4.0 standard drinks     Types: 4 Glasses of wine per week     Comment: 2 glasses of wine daily        Family History   Problem Relation Age of Onset     Hypertension Mother         B:1920     Colon Cancer Mother 84     Kidney Cancer Mother 95         age 95     Heart Disease Father         D: 70's  MI     Hypertension Brother         lipids as well     Heart Disease Brother         CABG x 3, mitral valve reaplcement     Colon Cancer Brother 68     Respiratory Brother         PRACHI      Myocardial Infarction Brother 69        sudden cardiac death     Diabetes No family hx of      Cerebrovascular Disease No family hx of        Review Of Systems    Constitutional:  Negative for fevers, chills, has some fatigue  Skin: negative for rash or lesions  Eyes: negative for eye pain, visual changes  Ears/Nose/Throat: negative for earache  , sinus trouble, persistent sore throat  Respiratory: No shortness of breath, dyspnea on exertion  or hemoptysis  Cardiovascular: negative for, palpitations, tachycardia    Gastrointestinal: negative for vomiting, abdominal pain and diarrhea  Genitourinary: negative for dysuria and hematuria  Back: negative for pain with back movement,  CVA pain  Musculoskeletal: negative for generalized joint pain, joint swelling and joint stiffness  Neurologic: negative for generalized or local weakness or incoordination  Hematologic/Lymphatic/Immunologic: negative for allergies and swollen nodes  Endocrine history of diabetes    EXAM:  /80   Pulse 94   Temp 98.2  F (36.8  C)   Resp 20   Wt 122.5 kg (270 lb)   SpO2 98%   BMI 39.87 kg/m    GENERAL APPEARANCE: alert, mild distress and cooperative  EYES: EOMI,  PERRL, conjunctiva clear  HENT: ear canals and TM's normal.  Nose and mouth without ulcers, erythema or lesions  NECK: supple, nontender, no lymphadenopathy  RESP: lungs clear to auscultation - no rales, rhonchi or wheezes  CV:  Irregularly irregular- rate 98,  Loud aortic murmur  ABDOMEN:  soft, nontender, no HSM or masses and bowel sounds normal  NEURO: Normal strength and tone, sensory exam grossly normal,  normal speech and mentation  SKIN: no suspicious lesions or rashes     CXR  No cardiomegaly, no pneumothorax, no plural effusion,  no pulmonary edema,  No noted rib / clavicle fracture.     No  infiltrate   x-ray read by me Kady Lizarraga MD       Office EKG demonstrates: irregularly irregular, rate 98, no acute ST/T changes c/w ischemia,no LVH by voltage criteria,    Has had a fib and NSR in past cardiac evaluations      Results for orders placed or performed in visit on 06/27/21   XR Chest 2 Views     Status: None    Narrative    XR CHEST TWO VIEWS   6/27/2021 3:14 PM     HISTORY: Chest heaviness    COMPARISON: Chest x-ray on 8/6/2019      Impression    IMPRESSION: PA and lateral views of the chest were obtained.  Cardiomediastinal silhouette is within normal limits. No suspicious  focal pulmonary opacities. No significant pleural effusion or  pneumothorax.    ALICE MORAN MD   Troponin I     Status: None   Result Value Ref Range    Troponin I ES 0.044 0.000 - 0.045 ug/L   CBC with platelets differential     Status: None   Result Value Ref Range    WBC 5.7 4.0 - 11.0 10e9/L    RBC Count 5.63 4.4 - 5.9 10e12/L    Hemoglobin 16.0 13.3 - 17.7 g/dL    Hematocrit 48.8 40.0 - 53.0 %    MCV 87 78 - 100 fl    MCH 28.4 26.5 - 33.0 pg    MCHC 32.8 31.5 - 36.5 g/dL    RDW 14.6 10.0 - 15.0 %    Platelet Count 210 150 - 450 10e9/L    % Neutrophils 72.0 %    % Lymphocytes 16.0 %    % Monocytes 9.0 %    % Eosinophils 2.5 %    % Basophils 0.5 %    Absolute Neutrophil 4.1 1.6 - 8.3 10e9/L    Absolute Lymphocytes 0.9 0.8 - 5.3 10e9/L    Absolute Monocytes 0.5 0.0 - 1.3 10e9/L    Absolute Eosinophils 0.1 0.0 - 0.7 10e9/L    Absolute Basophils 0.0 0.0 - 0.2 10e9/L    Diff Method Automated Method    Comprehensive metabolic panel     Status: None   Result Value Ref Range    Sodium 137 133 - 144 mmol/L    Potassium 3.8 3.4 - 5.3 mmol/L    Chloride 104 94 - 109 mmol/L    Carbon Dioxide 28 20 - 32 mmol/L    Anion Gap 5 3 - 14 mmol/L    Glucose 89 70 - 99 mg/dL    Urea Nitrogen 12 7 - 30 mg/dL    Creatinine 0.83 0.66 - 1.25 mg/dL    GFR Estimate 89 >60 mL/min/[1.73_m2]    GFR Estimate If Black >90 >60 mL/min/[1.73_m2]    Calcium 8.7 8.5 - 10.1 mg/dL    Bilirubin Total 0.9 0.2 - 1.3 mg/dL    Albumin 3.8 3.4 - 5.0 g/dL    Protein Total 7.7 6.8 - 8.8 g/dL    Alkaline Phosphatase 78 40 - 150 U/L     ALT 28 0 - 70 U/L    AST 18 0 - 45 U/L   D dimer quantitative     Status: None   Result Value Ref Range    D Dimer 0.5 0.0 - 0.50 ug/ml FEU

## 2021-07-30 ENCOUNTER — ANCILLARY PROCEDURE (OUTPATIENT)
Dept: GENERAL RADIOLOGY | Facility: CLINIC | Age: 71
End: 2021-07-30
Attending: NURSE PRACTITIONER
Payer: MEDICARE

## 2021-07-30 ENCOUNTER — OFFICE VISIT (OUTPATIENT)
Dept: URGENT CARE | Facility: URGENT CARE | Age: 71
End: 2021-07-30
Payer: MEDICARE

## 2021-07-30 VITALS
TEMPERATURE: 98 F | SYSTOLIC BLOOD PRESSURE: 132 MMHG | WEIGHT: 270 LBS | HEART RATE: 59 BPM | DIASTOLIC BLOOD PRESSURE: 80 MMHG | OXYGEN SATURATION: 96 % | BODY MASS INDEX: 39.87 KG/M2

## 2021-07-30 DIAGNOSIS — M25.571 ACUTE RIGHT ANKLE PAIN: ICD-10-CM

## 2021-07-30 DIAGNOSIS — M25.571 ACUTE RIGHT ANKLE PAIN: Primary | ICD-10-CM

## 2021-07-30 PROCEDURE — 99213 OFFICE O/P EST LOW 20 MIN: CPT | Performed by: NURSE PRACTITIONER

## 2021-07-30 PROCEDURE — 73610 X-RAY EXAM OF ANKLE: CPT | Mod: RT | Performed by: RADIOLOGY

## 2021-07-30 NOTE — PROGRESS NOTES
Chief Complaint   Patient presents with     Urgent Care     Musculoskeletal Problem     x 1 day hurt ankle/foot area felt a snap and a pop         ICD-10-CM    1. Acute right ankle pain  M25.571 XR Ankle Right G/E 3 Views   Patient has a air splint at home which she will apply when he gets there.  Recommended ice and elevation and follow-up in 2 weeks with orthopedics if symptoms have not resolved.  He may take Tylenol or ibuprofen as needed for pain.    Medical Decision Making    Differential Diagnosis:  MS Injury Pain: sprain, fracture, muscle strain, contusion, dislocation, bursitis and osteoarthritis    Xray - Reviewed and interpreted by me.  Right ankle x-ray shows calcaneal spurring and osteoarthritis but no acute fractures or dislocations.    Subjective     Giacomo Solis is an 71 year old male who presents to clinic today for right ankle pain. He thinks he rolled it to the lateral edge around 4 pm today when he was stepping over something in the garage. He had major reconstruction to this ankle 10 years ago and has had numbness in the foot ever since. NO change in the numbness today.        Objective    /80 (BP Location: Left arm, Patient Position: Sitting, Cuff Size: Adult Large)   Pulse 59   Temp 98  F (36.7  C) (Tympanic)   Wt 122.5 kg (270 lb)   SpO2 96%   BMI 39.87 kg/m      Physical Exam       GENERAL APPEARANCE: healthy appearing, alert     MS: extremities normal- no gross deformities noted; normal muscle tone except for the right ankle which has edema around the lateral malleolus, there is no tenderness on palpation but he definitely has decreased sensation in the foot (this is not new)     SKIN: no suspicious lesions or rashes     NEURO: Normal strength and tone, mentation intact and speech normal    Patient Instructions     Patient Education     Understanding Ankle Sprain    The ankle is the joint where the leg and foot meet. Bones are held in place by connective tissue called  ligaments. When ankle ligaments are stretched to the point of pain and injury, it's called an ankle sprain. A sprain can tear the ligaments. These tears can be very small but still cause pain. Ankle sprains are graded by the amount of ligament damage.     Grade 1 (mild). There is slight stretching and tiny tears to the ligament fibers. You may have mild ankle swelling and tenderness.    Grade 2 (moderate). This is a partial ligament tear and causes moderate ankle swelling and tenderness. There may be abnormal looseness when the healthcare provider moves your joint.    Grade 3 (severe). There is a complete tear to the ligament and a great deal of ankle swelling and tenderness. The ankle joint may be very unstable.  What causes an ankle sprain?  A sprain may occur when you twist your ankle or bend it too far. This can happen when you stumble or fall. Things that can make an ankle sprain more likely include:     Having had an ankle sprain before    Playing sports that involve running and jumping. Or playing contact sports such as football or hockey.    Wearing shoes that don t support your feet and ankles well    Having ankles with poor strength and flexibility  Symptoms of an ankle sprain  Symptoms may include:    Pain or soreness in the ankle    Swelling    Redness or bruising    Not being able to walk or put weight on the affected foot    Reduced range of motion in the ankle    A popping or tearing feeling at the time the sprain occurs    An abnormal or dislocated look to the ankle    Instability or too much range of motion in the ankle  Treatment for an ankle sprain  Treatment focuses on reducing pain and swelling, and preventing further injury. Treatments may include:     Resting the ankle. Avoid putting weight on it. This may mean using crutches until the sprain heals.    Prescription or over-the-counter medicines. These help reduce swelling and pain.    Cold packs. These help reduce pain and swelling.    Raising  your ankle above your heart. This helps reduce swelling.    Wrapping the ankle with an elastic bandage or ankle brace. This helps reduce swelling and gives some support to the ankle. In rare cases, you may need a cast or boot.    Stretching and other exercises. These improve flexibility and strength.    Heat packs. These may be recommended before doing ankle exercises.  Possible complications of an ankle sprain  An ankle that has been weakened by a sprain can be more likely to have repeated sprains afterward. Doing exercises to strengthen your ankle and improve balance can reduce your risk for repeated sprains. Other possible complications are long-term (chronic) pain or an ankle that remains unstable.   When to call your healthcare provider  Call your healthcare provider right away if you have any of these:    Fever of 100.4 F (38 C) or higher, or as directed by your provider    Chills    Pain, numbness, discoloration, or coldness in the foot or toes    Pain that gets worse    Symptoms that don t get better, or get worse    New symptoms  [employer] last reviewed this educational content on 6/1/2019 2000-2021 [employer]. All rights reserved. This information is not intended as a substitute for professional medical care. Always follow your healthcare professional's instructions.               SHAINA Cuba, CNP  [facility] Provider

## 2021-07-31 NOTE — PATIENT INSTRUCTIONS
Patient Education     Understanding Ankle Sprain    The ankle is the joint where the leg and foot meet. Bones are held in place by connective tissue called ligaments. When ankle ligaments are stretched to the point of pain and injury, it's called an ankle sprain. A sprain can tear the ligaments. These tears can be very small but still cause pain. Ankle sprains are graded by the amount of ligament damage.     Grade 1 (mild). There is slight stretching and tiny tears to the ligament fibers. You may have mild ankle swelling and tenderness.    Grade 2 (moderate). This is a partial ligament tear and causes moderate ankle swelling and tenderness. There may be abnormal looseness when the healthcare provider moves your joint.    Grade 3 (severe). There is a complete tear to the ligament and a great deal of ankle swelling and tenderness. The ankle joint may be very unstable.  What causes an ankle sprain?  A sprain may occur when you twist your ankle or bend it too far. This can happen when you stumble or fall. Things that can make an ankle sprain more likely include:     Having had an ankle sprain before    Playing sports that involve running and jumping. Or playing contact sports such as football or hockey.    Wearing shoes that don t support your feet and ankles well    Having ankles with poor strength and flexibility  Symptoms of an ankle sprain  Symptoms may include:    Pain or soreness in the ankle    Swelling    Redness or bruising    Not being able to walk or put weight on the affected foot    Reduced range of motion in the ankle    A popping or tearing feeling at the time the sprain occurs    An abnormal or dislocated look to the ankle    Instability or too much range of motion in the ankle  Treatment for an ankle sprain  Treatment focuses on reducing pain and swelling, and preventing further injury. Treatments may include:     Resting the ankle. Avoid putting weight on it. This may mean using crutches until the  sprain heals.    Prescription or over-the-counter medicines. These help reduce swelling and pain.    Cold packs. These help reduce pain and swelling.    Raising your ankle above your heart. This helps reduce swelling.    Wrapping the ankle with an elastic bandage or ankle brace. This helps reduce swelling and gives some support to the ankle. In rare cases, you may need a cast or boot.    Stretching and other exercises. These improve flexibility and strength.    Heat packs. These may be recommended before doing ankle exercises.  Possible complications of an ankle sprain  An ankle that has been weakened by a sprain can be more likely to have repeated sprains afterward. Doing exercises to strengthen your ankle and improve balance can reduce your risk for repeated sprains. Other possible complications are long-term (chronic) pain or an ankle that remains unstable.   When to call your healthcare provider  Call your healthcare provider right away if you have any of these:    Fever of 100.4 F (38 C) or higher, or as directed by your provider    Chills    Pain, numbness, discoloration, or coldness in the foot or toes    Pain that gets worse    Symptoms that don t get better, or get worse    New symptoms  [employer] last reviewed this educational content on 6/1/2019 2000-2021 [employer]. All rights reserved. This information is not intended as a substitute for professional medical care. Always follow your healthcare professional's instructions.

## 2021-08-10 ENCOUNTER — OFFICE VISIT (OUTPATIENT)
Dept: INTERNAL MEDICINE | Facility: CLINIC | Age: 71
End: 2021-08-10
Payer: MEDICARE

## 2021-08-10 DIAGNOSIS — R41.3 MEMORY LOSS: ICD-10-CM

## 2021-08-10 DIAGNOSIS — E66.01 SEVERE OBESITY (BMI 35.0-39.9) WITH COMORBIDITY (H): ICD-10-CM

## 2021-08-10 DIAGNOSIS — R60.0 BILATERAL LOWER EXTREMITY EDEMA: ICD-10-CM

## 2021-08-10 DIAGNOSIS — E78.5 HYPERLIPIDEMIA LDL GOAL <70: ICD-10-CM

## 2021-08-10 DIAGNOSIS — I77.810 THORACIC AORTIC ECTASIA (H): ICD-10-CM

## 2021-08-10 DIAGNOSIS — Z13.29 SCREENING FOR THYROID DISORDER: ICD-10-CM

## 2021-08-10 DIAGNOSIS — Z00.00 ENCOUNTER FOR MEDICARE ANNUAL WELLNESS EXAM: Primary | ICD-10-CM

## 2021-08-10 DIAGNOSIS — J44.9 CHRONIC OBSTRUCTIVE PULMONARY DISEASE, UNSPECIFIED COPD TYPE (H): ICD-10-CM

## 2021-08-10 DIAGNOSIS — I10 BENIGN ESSENTIAL HYPERTENSION: ICD-10-CM

## 2021-08-10 DIAGNOSIS — I21.4 NSTEMI (NON-ST ELEVATED MYOCARDIAL INFARCTION) (H): ICD-10-CM

## 2021-08-10 DIAGNOSIS — K21.9 GASTROESOPHAGEAL REFLUX DISEASE WITHOUT ESOPHAGITIS: ICD-10-CM

## 2021-08-10 DIAGNOSIS — Z12.5 SCREENING FOR PROSTATE CANCER: ICD-10-CM

## 2021-08-10 LAB
CHOLEST SERPL-MCNC: 160 MG/DL
FASTING STATUS PATIENT QL REPORTED: YES
HDLC SERPL-MCNC: 46 MG/DL
LDLC SERPL CALC-MCNC: 94 MG/DL
NONHDLC SERPL-MCNC: 114 MG/DL
PSA SERPL-MCNC: 0.66 UG/L (ref 0–4)
TRIGL SERPL-MCNC: 100 MG/DL
TSH SERPL DL<=0.005 MIU/L-ACNC: 1.05 MU/L (ref 0.4–4)
VIT B12 SERPL-MCNC: 415 PG/ML (ref 193–986)

## 2021-08-10 PROCEDURE — 80061 LIPID PANEL: CPT | Performed by: INTERNAL MEDICINE

## 2021-08-10 PROCEDURE — 84443 ASSAY THYROID STIM HORMONE: CPT | Performed by: INTERNAL MEDICINE

## 2021-08-10 PROCEDURE — G0103 PSA SCREENING: HCPCS | Performed by: INTERNAL MEDICINE

## 2021-08-10 PROCEDURE — 82607 VITAMIN B-12: CPT | Performed by: INTERNAL MEDICINE

## 2021-08-10 PROCEDURE — 99213 OFFICE O/P EST LOW 20 MIN: CPT | Mod: 25 | Performed by: INTERNAL MEDICINE

## 2021-08-10 PROCEDURE — G0439 PPPS, SUBSEQ VISIT: HCPCS | Performed by: INTERNAL MEDICINE

## 2021-08-10 PROCEDURE — 36415 COLL VENOUS BLD VENIPUNCTURE: CPT | Performed by: INTERNAL MEDICINE

## 2021-08-10 RX ORDER — LISINOPRIL 40 MG/1
40 TABLET ORAL DAILY
Qty: 90 TABLET | Refills: 3 | Status: SHIPPED | OUTPATIENT
Start: 2021-08-10 | End: 2022-09-20

## 2021-08-10 RX ORDER — FUROSEMIDE 20 MG
40 TABLET ORAL EVERY MORNING
Qty: 180 TABLET | Refills: 3 | Status: SHIPPED | OUTPATIENT
Start: 2021-08-10 | End: 2022-09-20

## 2021-08-10 RX ORDER — ROSUVASTATIN CALCIUM 40 MG/1
40 TABLET, COATED ORAL DAILY
Qty: 90 TABLET | Refills: 3 | Status: SHIPPED | OUTPATIENT
Start: 2021-08-10 | End: 2022-09-16

## 2021-08-10 RX ORDER — AMLODIPINE BESYLATE 5 MG/1
5 TABLET ORAL DAILY
Qty: 90 TABLET | Refills: 3 | Status: SHIPPED | OUTPATIENT
Start: 2021-08-10 | End: 2022-09-20

## 2021-08-10 RX ORDER — METOPROLOL SUCCINATE 50 MG/1
75 TABLET, EXTENDED RELEASE ORAL EVERY MORNING
Qty: 135 TABLET | Refills: 3 | Status: SHIPPED | OUTPATIENT
Start: 2021-08-10 | End: 2022-06-01

## 2021-08-10 RX ORDER — FLUTICASONE PROPIONATE AND SALMETEROL 232; 14 UG/1; UG/1
1 POWDER, METERED RESPIRATORY (INHALATION) 2 TIMES DAILY
Qty: 1 EACH | Refills: 11 | Status: SHIPPED | OUTPATIENT
Start: 2021-08-10 | End: 2021-08-11

## 2021-08-10 RX ORDER — ALBUTEROL SULFATE 90 UG/1
2 AEROSOL, METERED RESPIRATORY (INHALATION) EVERY 4 HOURS PRN
Qty: 18 G | Refills: 11 | Status: SHIPPED | OUTPATIENT
Start: 2021-08-10 | End: 2022-09-20

## 2021-08-10 NOTE — PROGRESS NOTES
SUBJECTIVE:   Giacomo Solis is a 71 year old male who presents for Preventive Visit.      Patient has been advised of split billing requirements and indicates understanding: Yes  Are you in the first 12 months of your Medicare Part B coverage?  No    Physical Health:    In general, how would you rate your overall physical health? fair    Outside of work, how many days during the week do you exercise? 1 day/week    Outside of work, approximately how many minutes a day do you exercise?15-30 minutes  If you drink alcohol do you typically have >3 drinks per day or >7 drinks per week? Yes - AUDIT SCORE:  6  AUDIT - Alcohol Use Disorders Identification Test - Reproduced from the World Health Organization Audit 2001 (Second Edition) 8/10/2021   1.  How often do you have a drink containing alcohol? 4 or more times a week   2.  How many drinks containing alcohol do you have on a typical day when you are drinking? 3 or 4   3.  How often do you have five or more drinks on one occasion? Less than monthly   4.  How often during the last year have you found that you were not able to stop drinking once you had started? Never   5.  How often during the last year have you failed to do what was normally expected of you because of drinking? Never   6.  How often during the last year have you needed a first drink in the morning to get yourself going after a heavy drinking session? Never   7.  How often during the last year have you had a feeling of guilt or remorse after drinking? Never   8.  How often during the last year have you been unable to remember what happened the night before because of your drinking? Never   9.  Have you or someone else been injured because of your drinking? No   10. Has a relative, friend, doctor or other health care worker been concerned about your drinking or suggested you cut down? No   TOTAL SCORE 6       Do you usually eat at least 4 servings of fruit and vegetables a day, include whole grains &  "fiber and avoid regularly eating high fat or \"junk\" foods? NO    Do you have any problems taking medications regularly?  No    Do you have any side effects from medications? none    Needs assistance for the following daily activities: no assistance needed    Which of the following safety concerns are present in your home?  none identified     Hearing impairment: Yes, Difficulty following a conversation in a noisy restaurant or crowded room.    In the past 6 months, have you been bothered by leaking of urine? no    Mental Health:    In general, how would you rate your overall mental or emotional health? good  PHQ-2 Score:      Do you feel safe in your environment? Yes    Have you ever done Advance Care Planning? (For example, a Health Directive, POLST, or a discussion with a medical provider or your loved ones about your wishes): Yes, patient states has an Advance Care Planning document and will bring a copy to the clinic.    Additional concerns to address?  No    Fall risk:  Fallen 2 or more times in the past year?: No  Any fall with injury in the past year?: No    Cognitive Screenin) Repeat 3 items (Leader, Season, Table)    2) Clock draw: NORMAL  3) 3 item recall: Recalls 3 objects  Results: 3 items recalled: COGNITIVE IMPAIRMENT LESS LIKELY    Mini-CogTM Copyright S Janene. Licensed by the author for use in [facility]; reprinted with permission (issa@.[email or web address]). All rights reserved.      Do you have sleep apnea, excessive snoring or daytime drowsiness?: no        1.    Hypertension:  History of hypertension, on medication.  No reported side effects from medications.    Reviewed last 6 BP readings in chart:  BP Readings from Last 6 Encounters:   08/10/21 135/85   21 132/80   21 122/80   20 113/78   19 124/75   19 118/70     No active cardiac complaints or symptoms with exercise.     2.    The patient has had a history of ongoing obesity.  Reviewed the weigth curves. "   Their current BMI is:  Body mass index is 39.87 kg/m .  Reviewed previous attempts at weight loss which have not been successful in producing prolonged weigth loss.   Discussed current eating and exercise habits.     Reviewed weights in chart:  Wt Readings from Last 10 Encounters:   08/10/21 122.5 kg (270 lb)   07/30/21 122.5 kg (270 lb)   06/27/21 122.5 kg (270 lb)   07/31/20 120.7 kg (266 lb)   08/29/19 123.4 kg (272 lb)   08/06/19 123.4 kg (272 lb)   09/21/18 117 kg (257 lb 14.4 oz)   07/31/18 116.2 kg (256 lb 3.2 oz)   06/28/18 115.7 kg (255 lb)   09/18/17 117.5 kg (259 lb 1.6 oz)        3.  Prior of coronary artery disease as listed in medical history.  No current or recent cardiovascaulr symptoms.   No shortness of breath, no episodes of chest pain/pressure, no dyspnea on exertion, no changes in his abiliy to perform physical exertion or tasks.  Takes the same amount of time to perform similar physical tasks.        4.  COPD remains stable.  Has not had any recent breathing troubles beyond usual baseline.  Has not any acute respiratory events.  Remains with intermittent cough, mild shortness of breath with overexertion as per usual.  Using medication as directed with reported side effects.     5.  complains of occ memory loss more than just forgetting his keys.     Reviewed and updated as needed this visit by clinical staff  Tobacco  Allergies  Meds              Reviewed and updated as needed this visit by Provider  Tobacco               Social History     Tobacco Use     Smoking status: Former Smoker     Packs/day: 0.75     Years: 20.00     Pack years: 15.00     Types: Cigarettes     Quit date: 5/11/2006     Years since quitting: 15.3     Smokeless tobacco: Never Used     Tobacco comment: had quit for 1 year, smoked on and off for years, quit again 5/06   Substance Use Topics     Alcohol use: Yes     Alcohol/week: 4.0 standard drinks     Types: 4 Glasses of wine per week     Comment: 2 glasses of wine  "daily                            Current providers sharing in care for this patient include:   Patient Care Team:  Sinan Stephens MD as PCP - General  Sinan Stephens MD as Assigned PCP  Hope Iverson APRN CNP as Assigned Heart and Vascular Provider    The following health maintenance items are reviewed in Epic and correct as of today:  Health Maintenance   Topic Date Due     ANNUAL REVIEW OF HM ORDERS  Never done     COPD ACTION PLAN  Never done     ZOSTER IMMUNIZATION (1 of 2) Never done     COLORECTAL CANCER SCREENING  08/02/2020     INFLUENZA VACCINE (1) 09/01/2021     MEDICARE ANNUAL WELLNESS VISIT  08/10/2022     LIPID  08/10/2022     FALL RISK ASSESSMENT  08/10/2022     ADVANCE CARE PLANNING  08/10/2026     DTAP/TDAP/TD IMMUNIZATION (2 - Td or Tdap) 09/18/2027     SPIROMETRY  Completed     HEPATITIS C SCREENING  Completed     PHQ-2  Completed     Pneumococcal Vaccine: 65+ Years  Completed     AORTIC ANEURYSM SCREENING (SYSTEM ASSIGNED)  Completed     COVID-19 Vaccine  Completed     IPV IMMUNIZATION  Aged Out     MENINGITIS IMMUNIZATION  Aged Out     HEPATITIS B IMMUNIZATION  Aged Out       **I reviewed the information recorded in the patient's EPIC chart (including but not limited to medical history, surgical history, family history, problem list, medication list, and allergy list) and updated the information as indicated based on the patients reported information.         ROS:  Constitutional, HEENT, cardiovascular, pulmonary, gi and gu systems are negative, except as otherwise noted.    OBJECTIVE:   /85   Pulse 72   Temp 98.6  F (37  C) (Temporal)   Wt 122.5 kg (270 lb)   BMI 39.87 kg/m   Estimated body mass index is 39.87 kg/m  as calculated from the following:    Height as of 7/31/20: 1.753 m (5' 9\").    Weight as of this encounter: 122.5 kg (270 lb).  EXAM:   GENERAL alert and no distress  EYES:  Normal sclera,conjunctiva, EOMI  HENT: oral and posterior pharynx without " lesions or erythema, facies symmetric  NECK: Neck supple. No LAD, without thyroidmegaly.  RESP: Clear to ausculation bilaterally without wheezes or crackles. Normal BS in all fields.  CV: RRR normal S1S2 without murmurs, rubs or gallops.  LYMPH: no cervical lymph adenopathy appreciated  MS: extremities- no gross deformities of the visible extremities noted,   EXT:  no lower extremity edema  PSYCH: Alert and oriented times 3; speech- coherent  SKIN:  No obvious significant skin lesions on visible portions of face     Diagnostic Test Results:  Labs reviewed in Epic    ASSESSMENT / PLAN:     (Z00.00) Encounter for Medicare annual wellness exam  (primary encounter diagnosis)  Comment: Discussed cardiac disease risk factors and cardiac disease risk factor modification, including diabetes screening, blood pressure screening (and management if indicated), and cholesterol screening.   Reviewed immunzation guidelines, including pneumococcal vaccines, annual influenza, and shingles vaccines.   Discussed routine cancer screenings, including skin cancer, colon cancer screening for everyone until age 80, prostate cancer screening in men until age 75, mammogram and PAP/pelvic for women until age 75.   Recommended regular dentist visits to care for remaining teeth.   Recommended regular screening for vision and glaucoma.   Recommended safe driving and accident avoidance.   Plan:     (I10) Benign essential hypertension  Comment: This condition is currently controlled on the current medical regimen.  Continue current therapy.   Plan: amLODIPine (NORVASC) 5 MG tablet, furosemide         (LASIX) 20 MG tablet, lisinopril (ZESTRIL) 40         MG tablet, metoprolol succinate ER (TOPROL-XL)         50 MG 24 hr tablet            (J44.9) Chronic obstructive pulmonary disease, unspecified COPD type (H)  Comment: This condition is currently controlled on the current medical regimen.  Continue current therapy.   Plan: albuterol (PROAIR  HFA/PROVENTIL HFA/VENTOLIN         HFA) 108 (90 Base) MCG/ACT inhaler,         fluticasone-vilanterol (BREO ELLIPTA) 200-25         MCG/INH inhaler, DISCONTINUED:         fluticasone-salmeterol (AIRDUO RESPICLICK)         232-14 MCG/ACT inhaler            (R60.0) Bilateral lower extremity edema  Comment: This condition is currently controlled on the current medical regimen.  Continue current therapy.   Plan: furosemide (LASIX) 20 MG tablet            (K21.9) Gastroesophageal reflux disease without esophagitis  Comment: This condition is currently controlled on the current medical regimen.  Continue current therapy.   Plan: omeprazole (PRILOSEC) 20 MG DR capsule            (I21.4) NSTEMI (non-ST elevated myocardial infarction) (H)  Comment: Discussed secondary risk factor modification and recommended continuing aggressive management of these items.   Plan: metoprolol succinate ER (TOPROL-XL) 50 MG 24 hr        tablet, rosuvastatin (CRESTOR) 40 MG tablet,         Lipid panel reflex to direct LDL Fasting            (E78.5) Hyperlipidemia LDL goal <70  Comment: Discussed guidelines recommending a statin cholesterol lowering medication for any patient with either diabetes and/or vascular disease, aiming for a LDL goal under 100 for sure, ideally under 70, using whatever dose of statin tolerated.    Plan: rosuvastatin (CRESTOR) 40 MG tablet, Lipid         panel reflex to direct LDL Fasting            (Z12.5) Screening for prostate cancer  Comment:   Plan: PSA, screen            (I77.810) Thoracic aortic ectasia (H)  Comment: Discussed secondary risk factor modification and recommended continuing aggressive management of these items.   Plan:     (E66.01) Severe obesity (BMI 35.0-39.9) with comorbidity (H)  Comment: Current BMI is: Body mass index is 39.87 kg/m ..  Reviewed weight patterns.   Obesity as a biological preventable and treatable disease, which is associated with significantly increased risk of many acute and  "chronic health conditions.   Obesity has now been recognized as a chronic disease by the American Medical Association.  Obesity has serious co-morbidities associated with obesity including increased risk for hypertension, stroke, coronary artery disease, dyslipidemia, Type II diabetes, depression, sleep apnea, cancers of the colon, breast and endometrium, obstructive sleep apnea, osteoarthritis and female infertility.  Recommended regular aerobic exercise, recommended improved diet aiming at lowering amount of processed foods, lower sugars, moderation/reduction of simple carbs and fat in the diet, establishing more regular meal times, always eating breakfast, front loading some of the calories and adding more protein to the diet.        Plan:     (R41.3) Memory loss  Comment: could be start of mild cognitive impairment.   Plan: Memory Clinic Referral, Vitamin B12, TSH with         free T4 reflex            (Z13.29) Screening for thyroid disorder  Comment:   Plan: TSH with free T4 reflex               Patient has been advised of split billing requirements and indicates understanding: Yes    COUNSELING:  Reviewed preventive health counseling, as reflected in patient instructions       Regular exercise       Healthy diet/nutrition       Vision screening       Hearing screening       Dental care       Bladder control       Fall risk prevention       Immunizations                 Aspirin prophylaxis        Colon cancer screening       Prostate cancer screening    Estimated body mass index is 39.87 kg/m  as calculated from the following:    Height as of 7/31/20: 1.753 m (5' 9\").    Weight as of this encounter: 122.5 kg (270 lb).        He reports that he quit smoking about 15 years ago. His smoking use included cigarettes. He has a 15.00 pack-year smoking history. He has never used smokeless tobacco.    Appropriate preventive services were discussed with this patient, including applicable screening as appropriate for " cardiovascular disease, diabetes, osteopenia/osteoporosis, and glaucoma.  As appropriate for age/gender, discussed screening for colorectal cancer, prostate cancer, breast cancer, and cervical cancer. Checklist reviewing preventive services available has been given to the patient.    Reviewed patients plan of care and provided an AVS. The  for Giacomo meets the Care Plan requirement. This Care Plan has been established and reviewed with the .    Counseling Resources:  ATP IV Guidelines  Pooled Cohorts Equation Calculator  Breast Cancer Risk Calculator  BRCA-Related Cancer Risk Assessment: FHS-7 Tool  FRAX Risk Assessment  ICSI Preventive Guidelines  Dietary Guidelines for Americans, 2010  USDA's MyPlate  ASA Prophylaxis  Lung CA Screening    Sinan Stephens MD  [facility]

## 2021-08-10 NOTE — PATIENT INSTRUCTIONS
"*  Continue all medications at the same doses.  Contact your usual pharmacy if you need refills.     *  Follow up in the Cardiology clicni this fall.  You should have another Echocardiogram to recheck the heart pumping and the aortic valve.     *  Return to see me in 1 year, sooner if needed.  Use [employer] or Call [phone] to schedule the appointment with me.     5 GOALS TO PREVENT VASCULAR DISEASE:     1.  Aggressive blood pressure control, under 130/80 ideally.  Using medications if needed.    Your blood pressure is under good control    BP Readings from Last 4 Encounters:   07/30/21 132/80   06/27/21 122/80   07/31/20 113/78   08/29/19 124/75       2.  Aggressive LDL cholesterol (\"bad cholesterol\") lowering as indicated.    Your goal is an LDL under 130 for sure, preferably under 100.  (If you have diabetes or previous vascular disease, the the LDL goals would be under 100 for sure, preferably under 70.)    New guidelines identify four high-risk groups who could benefit from statins:   *people with pre-existing heart disease, such as those who have had a heart attack;   *people ages 40 to 75 who have diabetes of any type  *patients ages 40 to 75 with at least a 7.5% risk of developing cardiovascular disease over the next decade, according to a formula described in the guidelines  *patients with the sort of super-high cholesterol that sometimes runs in families, as evidenced by an LDL of 190 milligrams per deciliter or higher    Your cholesterol levels are well controlled.    Recent Labs   Lab Test 08/19/20  0818 07/31/20  1008 06/23/15  0803 06/25/14  0712   CHOL 142 129 184 188   HDL 42 45 36* 39*   LDL 71 70 89 105   TRIG 143 71 295* 218*   CHOLHDLRATIO  --   --  5.1* 4.8       3.  Aggressive diabetic prevention, screening and/or management.      You do not have diabetes as of the most recent blood tests.     4.  No smoking    5.  Consider daily preventative aspirin over age 50 if you have enough cardiac " risk factors to place you at higher risk for the presence of vascular disease.    If you have any reason not to take aspirin such easy bruising or bleeding, stomach problems, other anticoagulant medications, or any other side effects, then you should not take Aspirin.      --Based on your current cardiac risk factor profile, you should take regular daily Aspirin 81 mg once per day.           Preventive Health Recommendations:   Male Ages 65 and over    Yearly exam:             See your health care provider every year in order to  o   Review health changes.   o   Discuss preventive care.    o   Review your medicines if your doctor has prescribed any.    Talk with your health care provider about whether you should have a test to screen for prostate cancer (PSA).    Every 3 years, have a diabetes test (fasting glucose). If you are at risk for diabetes, you should have this test more often.    Every 5 years, have a cholesterol test. Have this test more often if you are at risk for high cholesterol or heart disease.     Every 10 years, have a colonoscopy. Or, have a yearly FIT test (stool test). These exams will check for colon cancer.    Talk to with your health care provider about screening for Abdominal Aortic Aneurysm if you have a family history of AAA or have a history of smoking.    Shots:     Get a flu shot each year.     Get a tetanus shot every 10 years.     Talk to your doctor about your pneumonia vaccines. There are now two you should receive - Pneumovax (PPSV 23) and Prevnar (PCV 13).     Talk to your doctor about a shingles vaccine.     Talk to your doctor about the hepatitis B vaccine.  Nutrition:     Eat at least 5 servings of fruits and vegetables each day.     Eat whole-grain bread, whole-wheat pasta and brown rice instead of white grains and rice.     Talk to your provider about Calcium and Vitamin D.      --Good Grains:  Oats, brown rice, Quinoa (these do not raise the blood sugar as much)     --Bad  "grains:  Anything made from wheat or white rice     (because these raise the blood sugars significantly, and the possible gluten issue from wheat for some people).      --Proteins:  Aim for \"lean proteins\" including chicken, fish, seafood, pork, turkey, and eggs (in moderation); Eat red meat only occasionally    Lifestyle    Exercise for at least 150 minutes a week (30 minutes a day, 5 days a week). This will help you control your weight and prevent disease.     Limit alcohol to one drink per day.     No smoking.     Wear sunscreen to prevent skin cancer.     See your dentist every six months for an exam and cleaning.     See your eye doctor every 1 to 2 years to screen for conditions such as glaucoma, macular degeneration, cataracts, etc           Patient Education   Personalized Prevention Plan  You are due for the preventive services outlined below.  Your care team is available to assist you in scheduling these services.  If you have already completed any of these items, please share that information with your care team to update in your medical record.  Health Maintenance Due   Topic Date Due     ANNUAL REVIEW OF HM ORDERS  Never done     COPD Action Plan  Never done     Zoster (Shingles) Vaccine (1 of 2) Never done     Colorectal Cancer Screening  08/02/2020     PHQ-2  01/01/2021     FALL RISK ASSESSMENT  07/31/2021     Annual Wellness Visit  07/31/2021     Cholesterol Lab  08/19/2021        "

## 2021-08-13 ENCOUNTER — MYC MEDICAL ADVICE (OUTPATIENT)
Dept: INTERNAL MEDICINE | Facility: CLINIC | Age: 71
End: 2021-08-13

## 2021-08-13 DIAGNOSIS — J44.9 CHRONIC OBSTRUCTIVE PULMONARY DISEASE, UNSPECIFIED COPD TYPE (H): ICD-10-CM

## 2021-08-16 NOTE — TELEPHONE ENCOUNTER
Please see mychart from patient     Written script is pended     Avel Reynolds RN  [facility] Triage Nurse

## 2021-08-17 RX ORDER — FLUTICASONE PROPIONATE AND SALMETEROL 113; 14 UG/1; UG/1
1 POWDER, METERED RESPIRATORY (INHALATION) 2 TIMES DAILY
Qty: 1 EACH | Refills: 11 | Status: SHIPPED | OUTPATIENT
Start: 2021-08-17 | End: 2022-09-20

## 2021-08-29 VITALS
WEIGHT: 270 LBS | SYSTOLIC BLOOD PRESSURE: 135 MMHG | TEMPERATURE: 98.6 F | BODY MASS INDEX: 39.87 KG/M2 | DIASTOLIC BLOOD PRESSURE: 85 MMHG | HEART RATE: 72 BPM

## 2021-09-04 ENCOUNTER — HEALTH MAINTENANCE LETTER (OUTPATIENT)
Age: 71
End: 2021-09-04

## 2021-09-21 ENCOUNTER — HOSPITAL ENCOUNTER (OUTPATIENT)
Dept: CARDIOLOGY | Facility: CLINIC | Age: 71
Discharge: HOME | End: 2021-09-21
Attending: INTERNAL MEDICINE | Admitting: INTERNAL MEDICINE
Payer: MEDICARE

## 2021-09-21 DIAGNOSIS — I10 ESSENTIAL HYPERTENSION: ICD-10-CM

## 2021-09-21 DIAGNOSIS — E78.5 HYPERLIPIDEMIA LDL GOAL <70: ICD-10-CM

## 2021-09-21 DIAGNOSIS — I35.0 AORTIC VALVE STENOSIS, ETIOLOGY OF CARDIAC VALVE DISEASE UNSPECIFIED: ICD-10-CM

## 2021-09-21 LAB — LVEF ECHO: NORMAL

## 2021-09-21 PROCEDURE — 93306 TTE W/DOPPLER COMPLETE: CPT

## 2021-09-21 PROCEDURE — 93306 TTE W/DOPPLER COMPLETE: CPT | Mod: 26 | Performed by: INTERNAL MEDICINE

## 2021-09-22 ENCOUNTER — OFFICE VISIT (OUTPATIENT)
Dept: CARDIOLOGY | Facility: CLINIC | Age: 71
End: 2021-09-22
Payer: MEDICARE

## 2021-09-22 VITALS
HEIGHT: 69 IN | SYSTOLIC BLOOD PRESSURE: 135 MMHG | OXYGEN SATURATION: 96 % | BODY MASS INDEX: 39.4 KG/M2 | DIASTOLIC BLOOD PRESSURE: 80 MMHG | WEIGHT: 266 LBS | HEART RATE: 61 BPM

## 2021-09-22 DIAGNOSIS — I35.0 AORTIC VALVE STENOSIS, ETIOLOGY OF CARDIAC VALVE DISEASE UNSPECIFIED: Primary | ICD-10-CM

## 2021-09-22 PROCEDURE — 99215 OFFICE O/P EST HI 40 MIN: CPT | Performed by: INTERNAL MEDICINE

## 2021-09-22 ASSESSMENT — MIFFLIN-ST. JEOR: SCORE: 1961.95

## 2021-09-22 NOTE — Clinical Note
9/22/2021    Sinan Stephens MD  [address] [facility] [address]    RE: Giacomo Solis       Dear Colleague,    I had the pleasure of seeing Giacomo Solis in the [facility].    CARDIOLOGY CONSULTATION:    Mr. Solis is a very pleasant, 71-year-old gentleman whom I met in 11/2016 when he presented with an NSTEMI.  He was found to have moderate aortic valve stenosis with a mean gradient that has been between 18-22 and moderate disease in his coronary arteries with small vessels distally.  He has been managed medically and doing really well.  I last saw him in 08/2019.      Since I last saw him he has been doing well.  His weight has been stable with BMI around 40.  His blood pressure is under reasonable control.   He has a history of paroxysmal atrial fibrillation, but did have a GI bleed on Xarelto, and so it was recommended he not restart that.  He is in sinus rhythm today. His brother had his aortic valve replaced in 2018; it was likely bicuspid.  On Mr. Solis's valve, it is not clear whether it is bicuspid because of the degree of calcification, but given his brother's disease, it likely is the case.  His ascending aorta has been between 3.8-4.1, which is where it was on his echo associated with today's visit.  His mean gradient across his aortic valve has been creeping up and is currently 33 mmHg. He denies any chest pain, tightness or shortness of breath.  No syncope or pre-syncope.  He golfs for exercise. He goes to [place] for the Winter and is leaving in the next month and will be back in May.  Mr. Solis does have moderate obstructive sleep apnea; historically he has not used a CPAP mask.       PAST MEDICAL HISTORY:  Past Medical History:   Diagnosis Date     Atrial fibrillation (H)     paroxysmal     Basal cell carcinoma      COPD (chronic obstructive pulmonary disease) (H) 08/06/2019    FEV1 53 %     Coronary artery disease  involving native coronary artery of native heart 11/30/16    NSTEMI (peak troponin 0.05); coronary angiogram showed modeerate nonocclusive disease: LM 30-40 %, LAD 30-40%, CX 30-40 %     Diverticulosis of colon 4/16/12    sigmoid diverticulosis per CT scan; episode acute diverticulitis per CT scan     Heart valve insufficiency      Lipoma of other skin and subcutaneous tissue      Lumbar spinal stenosis 9/1/10    lumbar spinal stenosis at L5     NSTEMI (non-ST elevated myocardial infarction) (H) 11/30/16    NSTEMI (peak troponin 0.05); no acute lesions on angiogram     Obesity (BMI 30-39.9) 7/9/13    BMI 38     Obstructive sleep apnea 10/7/14    per sleep study:  AHI 25/hr, desats to 84%. Supine /hr, only 6 minutes spent supine     Other and unspecified hyperlipidemia      Prediabetes 7/9/13    elevated fasting glucoses     Shingles 11/2017    dx when he was in [place]     Tubular adenoma of colon 8/13/13    3 adenomatous poltyps removed, 4 hyperplastic polyps removed     Unspecified essential hypertension        CURRENT MEDICATIONS:  Current Outpatient Medications   Medication Sig Dispense Refill     albuterol (PROAIR HFA/PROVENTIL HFA/VENTOLIN HFA) 108 (90 Base) MCG/ACT inhaler Inhale 2 puffs into the lungs every 4 hours as needed for shortness of breath / dyspnea or wheezing 18 g 11     amLODIPine (NORVASC) 5 MG tablet Take 1 tablet (5 mg) by mouth daily 90 tablet 3     aspirin 81 MG tablet Take 1 tablet by mouth daily.       fluticasone-salmeterol (AIRDUO RESPICLICK) 113-14 MCG/ACT inhaler Inhale 1 puff into the lungs 2 times daily 1 each 11     furosemide (LASIX) 20 MG tablet Take 2 tablets (40 mg) by mouth every morning 180 tablet 3     lisinopril (ZESTRIL) 40 MG tablet Take 1 tablet (40 mg) by mouth daily 90 tablet 3     metoprolol succinate ER (TOPROL-XL) 50 MG 24 hr tablet Take 1.5 tablets (75 mg) by mouth every morning 135 tablet 3     omeprazole (PRILOSEC) 20 MG DR capsule Take 1 capsule (20 mg) by  mouth daily 90 capsule 3     rosuvastatin (CRESTOR) 40 MG tablet Take 1 tablet (40 mg) by mouth daily 90 tablet 3     fluticasone-vilanterol (BREO ELLIPTA) 200-25 MCG/INH inhaler Inhale 1 puff into the lungs daily 60 each 5       PAST SURGICAL HISTORY:  Past Surgical History:   Procedure Laterality Date     COLONOSCOPY  13    3 adenomatous polyps and 4 hyperplastic polyps removed     HC TOOTH EXTRACTION W/FORCEP      4 wisdom teeth removed withou difficulty       ALLERGIES  No known allergies    FAMILY HX:  Family History   Problem Relation Age of Onset     Hypertension Mother         B:192     Colon Cancer Mother 84     Kidney Cancer Mother 95         age 95     Heart Disease Father         D: 70's  MI     Hypertension Brother         lipids as well     Heart Disease Brother         CABG x 3, mitral valve reaplcement     Colon Cancer Brother 68     Respiratory Brother         PRACHI     Myocardial Infarction Brother 69        sudden cardiac death     Diabetes No family hx of      Cerebrovascular Disease No family hx of        SOCIAL HX:  Social History     Socioeconomic History     Marital status:      Spouse name: None     Number of children: None     Years of education: None     Highest education level: None   Occupational History     None   Tobacco Use     Smoking status: Former Smoker     Packs/day: 0.75     Years: 20.00     Pack years: 15.00     Types: Cigarettes     Quit date: 2006     Years since quitting: 15.3     Smokeless tobacco: Never Used     Tobacco comment: had quit for 1 year, smoked on and off for years, quit again    Substance and Sexual Activity     Alcohol use: Yes     Alcohol/week: 4.0 standard drinks     Types: 4 Glasses of wine per week     Comment: 2 glasses of wine daily      Drug use: No     Sexual activity: Not Currently   Other Topics Concern     Parent/sibling w/ CABG, MI or angioplasty before 65F 55M? Not Asked      Service Not Asked     Blood  "Transfusions Not Asked     Caffeine Concern Not Asked     Occupational Exposure Not Asked     Hobby Hazards Not Asked     Sleep Concern Not Asked     Stress Concern Not Asked     Weight Concern Not Asked     Special Diet No     Back Care Not Asked     Exercise No     Bike Helmet Not Asked     Seat Belt Not Asked     Self-Exams Not Asked   Social History Narrative     None     Social Determinants of Health     Financial Resource Strain:      Difficulty of Paying Living Expenses:    Food Insecurity:      Worried About Running Out of Food in the Last Year:      Ran Out of Food in the Last Year:    Transportation Needs:      Lack of Transportation (Medical):      Lack of Transportation (Non-Medical):    Physical Activity:      Days of Exercise per Week:      Minutes of Exercise per Session:    Stress:      Feeling of Stress :    Social Connections:      Frequency of Communication with Friends and Family:      Frequency of Social Gatherings with Friends and Family:      Attends [employer] Services:      Active Member of Clubs or Organizations:      Attends Club or Organization Meetings:      Marital Status:    Intimate Partner Violence:      Fear of Current or Ex-Partner:      Emotionally Abused:      Physically Abused:      Sexually Abused:        ROS:  Constitutional: No fever, chills, or sweats. No weight gain/loss.   ENT: No visual disturbance, ear ache, epistaxis, sore throat.   Allergies/Immunologic: Negative.   Respiratory: No cough, hemoptysis.   Cardiovascular: As per HPI.   GI: No nausea, vomiting, hematemesis, melena, or hematochezia.   : No urinary frequency, dysuria, or hematuria.   Integument: Negative.   Psychiatric: Negative.   Neuro: Negative.   Endocrinology: Negative.   Musculoskeletal: No myalgia.    VITAL SIGNS:  /80   Pulse 61   Ht 1.753 m (5' 9\")   Wt 120.7 kg (266 lb)   SpO2 96%   BMI 39.28 kg/m    Body mass index is 39.28 kg/m .  Wt Readings from Last 2 Encounters:   09/22/21 120.7 kg " (266 lb)   08/10/21 122.5 kg (270 lb)       PHYSICAL EXAM  Giacomo Solis IS A 71 year old male.in no acute distress.  HEENT: Unremarkable.  Neck: JVP normal.  Carotids +4/4 bilaterally without bruits.  Lungs: CTA.  Cor: RRR. Normal S1 and S2.  Late-peaking systolic murmur.  Neuro: Grossly intact.    LABS    Lab Results   Component Value Date    WBC 5.7 06/27/2021     Lab Results   Component Value Date    RBC 5.63 06/27/2021     Lab Results   Component Value Date    HGB 16.0 06/27/2021     Lab Results   Component Value Date    HCT 48.8 06/27/2021     No components found for: MCT  Lab Results   Component Value Date    MCV 87 06/27/2021     Lab Results   Component Value Date    MCH 28.4 06/27/2021     Lab Results   Component Value Date    MCHC 32.8 06/27/2021     Lab Results   Component Value Date    RDW 14.6 06/27/2021     Lab Results   Component Value Date     06/27/2021      Recent Labs   Lab Test 06/27/21  1456 07/31/20  1008    139   POTASSIUM 3.8 4.2   CHLORIDE 104 106   CO2 28 27   ANIONGAP 5 6   GLC 89 109*   BUN 12 14   CR 0.83 0.77   LASHAY 8.7 8.5     Recent Labs   Lab Test 08/10/21  1314 08/19/20  0818 02/22/16  0743 06/23/15  0803 06/25/14  0712 06/25/14  0712   CHOL 160 142   < > 184   < > 188   HDL 46 42   < > 36*   < > 39*   LDL 94 71   < > 89   < > 105   TRIG 100 143   < > 295*   < > 218*   CHOLHDLRATIO  --   --   --  5.1*  --  4.8   NHDL 114 100   < >  --   --   --     < > = values in this interval not displayed.          IMPRESSION, REPORT, PLAN:   1.  Moderate coronary artery disease, managed medically with last cath 12/2016, currently asymptomatic.   2.  Hypertension, reasonably controlled.   3.  History of GI bleed on Xarelto, currently not anticoagulated as a result.   4.  Paroxysmal atrial fibrillation.   5.  Hyperlipidemia, well controlled.   6.  Obesity.   7.  Moderate bicuspid aortic valve stenosis with a mean gradient of 33 mmHg.     8.  Ascending aortic dilatation at 4 cm.    9.  Obstructive sleep apnea.      DISCUSSION:  It was a pleasure seeing Mr. Solis in followup.  He is doing well from a cardiovascular standpoint and does not have any concerning symptoms.  We discussed in detail diet and exercise.  His valve gradient continues to increase; I discussed that I would like him to follow up when he returns from [place] in May and we will do an echo at that time.  He will let us know if he has any concerning symptoms, which we discussed could present should his valve disease progress before then.   It was a pleasure to see him today.  Please do not hesitate to contact me with any questions or concerns.      SADI DU MD    42 minutes face-face, documentation and review of records on day of visit      Thank you for allowing me to participate in the care of your patient.      Sincerely,     Sadi Du MD     [facility]  cc:   No referring provider defined for this encounter.

## 2021-09-22 NOTE — PROGRESS NOTES
CARDIOLOGY CONSULTATION:    Mr. Solis is a very pleasant, 71-year-old gentleman whom I met in 11/2016 when he presented with an NSTEMI.  He was found to have moderate aortic valve stenosis with a mean gradient that has been between 18-22 and moderate disease in his coronary arteries with small vessels distally.  He has been managed medically and doing really well.  I last saw him in 08/2019.      Since I last saw him he has been doing well.  His weight has been stable with BMI around 40.  His blood pressure is under reasonable control.   He has a history of paroxysmal atrial fibrillation, but did have a GI bleed on Xarelto, and so it was recommended he not restart that.  He is in sinus rhythm today. His brother had his aortic valve replaced in 2018; it was likely bicuspid.  On Mr. Solis's valve, it is not clear whether it is bicuspid because of the degree of calcification, but given his brother's disease, it likely is the case.  His ascending aorta has been between 3.8-4.1, which is where it was on his echo associated with today's visit.  His mean gradient across his aortic valve has been creeping up and is currently 33 mmHg. He denies any chest pain, tightness or shortness of breath.  No syncope or pre-syncope.  He golfs for exercise. He goes to [place] for the Winter and is leaving in the next month and will be back in May.  Mr. Solis does have moderate obstructive sleep apnea; historically he has not used a CPAP mask.       PAST MEDICAL HISTORY:  Past Medical History:   Diagnosis Date     Atrial fibrillation (H)     paroxysmal     Basal cell carcinoma      COPD (chronic obstructive pulmonary disease) (H) 08/06/2019    FEV1 53 %     Coronary artery disease involving native coronary artery of native heart 11/30/16    NSTEMI (peak troponin 0.05); coronary angiogram showed modeerate nonocclusive disease: LM 30-40 %, LAD 30-40%, CX 30-40 %     Diverticulosis of colon 4/16/12    sigmoid diverticulosis per CT  scan; episode acute diverticulitis per CT scan     Heart valve insufficiency      Lipoma of other skin and subcutaneous tissue      Lumbar spinal stenosis 9/1/10    lumbar spinal stenosis at L5     NSTEMI (non-ST elevated myocardial infarction) (H) 11/30/16    NSTEMI (peak troponin 0.05); no acute lesions on angiogram     Obesity (BMI 30-39.9) 7/9/13    BMI 38     Obstructive sleep apnea 10/7/14    per sleep study:  AHI 25/hr, desats to 84%. Supine /hr, only 6 minutes spent supine     Other and unspecified hyperlipidemia      Prediabetes 7/9/13    elevated fasting glucoses     Shingles 11/2017    dx when he was in [place]     Tubular adenoma of colon 8/13/13    3 adenomatous poltyps removed, 4 hyperplastic polyps removed     Unspecified essential hypertension        CURRENT MEDICATIONS:  Current Outpatient Medications   Medication Sig Dispense Refill     albuterol (PROAIR HFA/PROVENTIL HFA/VENTOLIN HFA) 108 (90 Base) MCG/ACT inhaler Inhale 2 puffs into the lungs every 4 hours as needed for shortness of breath / dyspnea or wheezing 18 g 11     amLODIPine (NORVASC) 5 MG tablet Take 1 tablet (5 mg) by mouth daily 90 tablet 3     aspirin 81 MG tablet Take 1 tablet by mouth daily.       fluticasone-salmeterol (AIRDUO RESPICLICK) 113-14 MCG/ACT inhaler Inhale 1 puff into the lungs 2 times daily 1 each 11     furosemide (LASIX) 20 MG tablet Take 2 tablets (40 mg) by mouth every morning 180 tablet 3     lisinopril (ZESTRIL) 40 MG tablet Take 1 tablet (40 mg) by mouth daily 90 tablet 3     metoprolol succinate ER (TOPROL-XL) 50 MG 24 hr tablet Take 1.5 tablets (75 mg) by mouth every morning 135 tablet 3     omeprazole (PRILOSEC) 20 MG DR capsule Take 1 capsule (20 mg) by mouth daily 90 capsule 3     rosuvastatin (CRESTOR) 40 MG tablet Take 1 tablet (40 mg) by mouth daily 90 tablet 3     fluticasone-vilanterol (BREO ELLIPTA) 200-25 MCG/INH inhaler Inhale 1 puff into the lungs daily 60 each 5       PAST SURGICAL  HISTORY:  Past Surgical History:   Procedure Laterality Date     COLONOSCOPY  13    3 adenomatous polyps and 4 hyperplastic polyps removed     HC TOOTH EXTRACTION W/FORCEP      4 wisdom teeth removed withou difficulty       ALLERGIES  No known allergies    FAMILY HX:  Family History   Problem Relation Age of Onset     Hypertension Mother         B:1920     Colon Cancer Mother 84     Kidney Cancer Mother 95         age 95     Heart Disease Father         D: 70's  MI     Hypertension Brother         lipids as well     Heart Disease Brother         CABG x 3, mitral valve reaplcement     Colon Cancer Brother 68     Respiratory Brother         PRACHI     Myocardial Infarction Brother 69        sudden cardiac death     Diabetes No family hx of      Cerebrovascular Disease No family hx of        SOCIAL HX:  Social History     Socioeconomic History     Marital status:      Spouse name: None     Number of children: None     Years of education: None     Highest education level: None   Occupational History     None   Tobacco Use     Smoking status: Former Smoker     Packs/day: 0.75     Years: 20.00     Pack years: 15.00     Types: Cigarettes     Quit date: 2006     Years since quitting: 15.3     Smokeless tobacco: Never Used     Tobacco comment: had quit for 1 year, smoked on and off for years, quit again    Substance and Sexual Activity     Alcohol use: Yes     Alcohol/week: 4.0 standard drinks     Types: 4 Glasses of wine per week     Comment: 2 glasses of wine daily      Drug use: No     Sexual activity: Not Currently   Other Topics Concern     Parent/sibling w/ CABG, MI or angioplasty before 65F 55M? Not Asked      Service Not Asked     Blood Transfusions Not Asked     Caffeine Concern Not Asked     Occupational Exposure Not Asked     Hobby Hazards Not Asked     Sleep Concern Not Asked     Stress Concern Not Asked     Weight Concern Not Asked     Special Diet No     Back Care Not Asked      "Exercise No     Bike Helmet Not Asked     Seat Belt Not Asked     Self-Exams Not Asked   Social History Narrative     None     Social Determinants of Health     Financial Resource Strain:      Difficulty of Paying Living Expenses:    Food Insecurity:      Worried About Running Out of Food in the Last Year:      Ran Out of Food in the Last Year:    Transportation Needs:      Lack of Transportation (Medical):      Lack of Transportation (Non-Medical):    Physical Activity:      Days of Exercise per Week:      Minutes of Exercise per Session:    Stress:      Feeling of Stress :    Social Connections:      Frequency of Communication with Friends and Family:      Frequency of Social Gatherings with Friends and Family:      Attends Anabaptism Services:      Active Member of Clubs or Organizations:      Attends Club or Organization Meetings:      Marital Status:    Intimate Partner Violence:      Fear of Current or Ex-Partner:      Emotionally Abused:      Physically Abused:      Sexually Abused:        ROS:  Constitutional: No fever, chills, or sweats. No weight gain/loss.   ENT: No visual disturbance, ear ache, epistaxis, sore throat.   Allergies/Immunologic: Negative.   Respiratory: No cough, hemoptysis.   Cardiovascular: As per HPI.   GI: No nausea, vomiting, hematemesis, melena, or hematochezia.   : No urinary frequency, dysuria, or hematuria.   Integument: Negative.   Psychiatric: Negative.   Neuro: Negative.   Endocrinology: Negative.   Musculoskeletal: No myalgia.    VITAL SIGNS:  /80   Pulse 61   Ht 1.753 m (5' 9\")   Wt 120.7 kg (266 lb)   SpO2 96%   BMI 39.28 kg/m    Body mass index is 39.28 kg/m .  Wt Readings from Last 2 Encounters:   09/22/21 120.7 kg (266 lb)   08/10/21 122.5 kg (270 lb)       PHYSICAL EXAM  Giacomo Solis IS A 71 year old male.in no acute distress.  HEENT: Unremarkable.  Neck: JVP normal.  Carotids +4/4 bilaterally without bruits.  Lungs: CTA.  Cor: RRR. Normal S1 and S2.  " Late-peaking systolic murmur.  Neuro: Grossly intact.    LABS    Lab Results   Component Value Date    WBC 5.7 06/27/2021     Lab Results   Component Value Date    RBC 5.63 06/27/2021     Lab Results   Component Value Date    HGB 16.0 06/27/2021     Lab Results   Component Value Date    HCT 48.8 06/27/2021     No components found for: MCT  Lab Results   Component Value Date    MCV 87 06/27/2021     Lab Results   Component Value Date    MCH 28.4 06/27/2021     Lab Results   Component Value Date    MCHC 32.8 06/27/2021     Lab Results   Component Value Date    RDW 14.6 06/27/2021     Lab Results   Component Value Date     06/27/2021      Recent Labs   Lab Test 06/27/21  1456 07/31/20  1008    139   POTASSIUM 3.8 4.2   CHLORIDE 104 106   CO2 28 27   ANIONGAP 5 6   GLC 89 109*   BUN 12 14   CR 0.83 0.77   LASHAY 8.7 8.5     Recent Labs   Lab Test 08/10/21  1314 08/19/20  0818 02/22/16  0743 06/23/15  0803 06/25/14  0712 06/25/14  0712   CHOL 160 142   < > 184   < > 188   HDL 46 42   < > 36*   < > 39*   LDL 94 71   < > 89   < > 105   TRIG 100 143   < > 295*   < > 218*   CHOLHDLRATIO  --   --   --  5.1*  --  4.8   NHDL 114 100   < >  --   --   --     < > = values in this interval not displayed.          IMPRESSION, REPORT, PLAN:   1.  Moderate coronary artery disease, managed medically with last cath 12/2016, currently asymptomatic.   2.  Hypertension, reasonably controlled.   3.  History of GI bleed on Xarelto, currently not anticoagulated as a result.   4.  Paroxysmal atrial fibrillation.   5.  Hyperlipidemia, well controlled.   6.  Obesity.   7.  Moderate bicuspid aortic valve stenosis with a mean gradient of 33 mmHg.     8.  Ascending aortic dilatation at 4 cm.   9.  Obstructive sleep apnea.      DISCUSSION:  It was a pleasure seeing Mr. Solis in followup.  He is doing well from a cardiovascular standpoint and does not have any concerning symptoms.  We discussed in detail diet and exercise.  His valve  gradient continues to increase; I discussed that I would like him to follow up when he returns from [place] in May and we will do an echo at that time.  He will let us know if he has any concerning symptoms, which we discussed could present should his valve disease progress before then.   It was a pleasure to see him today.  Please do not hesitate to contact me with any questions or concerns.      SADI THIBODEAUX MD    42 minutes face-face, documentation and review of records on day of visit

## 2021-10-18 ENCOUNTER — TRANSFERRED RECORDS (OUTPATIENT)
Dept: HEALTH INFORMATION MANAGEMENT | Facility: CLINIC | Age: 71
End: 2021-10-18
Payer: MEDICARE

## 2021-10-19 ENCOUNTER — TRANSFERRED RECORDS (OUTPATIENT)
Dept: HEALTH INFORMATION MANAGEMENT | Facility: CLINIC | Age: 71
End: 2021-10-19
Payer: MEDICARE

## 2022-01-05 DIAGNOSIS — K21.9 GASTROESOPHAGEAL REFLUX DISEASE WITHOUT ESOPHAGITIS: ICD-10-CM

## 2022-05-17 ENCOUNTER — NURSE TRIAGE (OUTPATIENT)
Dept: INTERNAL MEDICINE | Facility: CLINIC | Age: 72
End: 2022-05-17
Payer: MEDICARE

## 2022-05-17 NOTE — TELEPHONE ENCOUNTER
Reason for Call: appointment    Detailed comments: patient said that he has now developed a cough and that he feels like there is something going on in his chest or lungs. I told him that I can get him to the nurse line to get triaged but he said that he did not want to be told to go to . He would like to see if Dr. Stephens can fit him in asap. He does not believe it is covid-19 because he had a covid test done on Monday and his results were negative. Please reach back out to Cornelius, he is expecting a call back.    Phone Number Patient can be reached at: Home number on file [phone] (home)    Best Time: any time throughout the day    Can we leave a detailed message on this number? YES    Call taken on 5/17/2022 at 5:00 PM by Naga Bagley

## 2022-05-18 NOTE — TELEPHONE ENCOUNTER
"Called patient to triage symptoms.     Patient reports having a productive cough starting 5/15, is unable to describe sputum color d/t swallowing phlegm.     Pt has Hx of COPD and various cardiac history as well.   Pt takes Lisinopril, but feels cough is not related to medication regimen.    Per protocol, patient needs to be seen within 3 days in office. Patient verbalized agreement and is scheduled 5/20 with an acute-care provider.      1. ONSET: \"When did the cough begin?\"       5/15  2. SEVERITY: \"How bad is the cough today?\"       Much better today- wife has pt on mucinex and vitamin C  3. RESPIRATORY DISTRESS: \"Describe your breathing.\"       Normal  4. FEVER: \"Do you have a fever?\" If so, ask: \"What is your temperature, how was it measured, and when did it start?\"      No  5. HEMOPTYSIS: \"Are you coughing up any blood?\" If so ask: \"How much?\" (flecks, streaks, tablespoons, etc.)      No  6. TREATMENT: \"What have you done so far to treat the cough?\" (e.g., meds, fluids, humidifier)      Mucinex and Vitamin C  7. CARDIAC HISTORY: \"Do you have any history of heart disease?\" (e.g., heart attack, congestive heart failure)       Hypertension, Afib, Heart Murmer  8. LUNG HISTORY: \"Do you have any history of lung disease?\"  (e.g., pulmonary embolus, asthma, emphysema)      COPD  9. PE RISK FACTORS: \"Do you have a history of blood clots?\" (or: recent major surgery, recent prolonged travel, bedridden)      No  10. OTHER SYMPTOMS: \"Do you have any other symptoms? (e.g., runny nose, wheezing, chest pain)       Runny nose    Reason for Disposition    Taking an ACE Inhibitor medication (e.g., benazepril/LOTENSIN, captopril/CAPOTEN, enalapril/VASOTEC, lisinopril/ZESTRIL)    Additional Information    Negative: Bluish (or gray) lips or face    Negative: Severe difficulty breathing (e.g., struggling for each breath, speaks in single words)    Negative: Rapid onset of cough and has hives    Negative: Coughing started suddenly " after medicine, an allergic food or bee sting    Negative: Difficulty breathing after exposure to flames, smoke, or fumes    Negative: Sounds like a life-threatening emergency to the triager    Negative: Previous asthma attacks and this feels like asthma attack    Negative: Chest pain present when not coughing    Negative: Difficulty breathing    Negative: Passed out (i.e., fainted, collapsed and was not responding)    Negative: Patient sounds very sick or weak to the triager    Negative: Coughed up > 1 tablespoon (15 ml) blood (Exception: blood-tinged sputum)    Negative: Fever > 103 F (39.4 C)    Negative: Fever > 101 F (38.3 C) and over 60 years of age    Negative: Fever > 100.0 F (37.8 C) and has diabetes mellitus or a weak immune system (e.g., HIV positive, cancer chemotherapy, organ transplant, splenectomy, chronic steroids)    Negative: Fever > 100.0 F (37.8 C) and bedridden (e.g., nursing home patient, stroke, chronic illness, recovering from surgery)    Negative: Increasing ankle swelling    Negative: Wheezing is present    Negative: SEVERE coughing spells (e.g., whooping sound after coughing, vomiting after coughing)    Negative: Coughing up marvin-colored (reddish-brown) or blood-tinged sputum    Negative: Fever present > 3 days (72 hours)    Negative: Fever returns after gone for over 24 hours and symptoms worse or not improved    Negative: Using nasal washes and pain medicine > 24 hours and sinus pain persists    Negative: Known COPD or other severe lung disease (i.e., bronchiectasis, cystic fibrosis, lung surgery) and worsening symptoms (i.e., increased sputum purulence or amount, increased breathing difficulty)    Negative: Continuous (nonstop) coughing interferes with work or school and no improvement using cough treatment per Care Advice    Negative: Patient wants to be seen    Negative: Cough has been present for > 3 weeks    Negative: Allergy symptoms are also present (e.g., itchy eyes, clear nasal  discharge, postnasal drip)    Negative: Nasal discharge present > 10 days    Negative: Exposure to TB (Tuberculosis)    Protocols used: COUGH-A-OH

## 2022-05-20 ENCOUNTER — OFFICE VISIT (OUTPATIENT)
Dept: INTERNAL MEDICINE | Facility: CLINIC | Age: 72
End: 2022-05-20
Payer: MEDICARE

## 2022-05-20 VITALS
HEIGHT: 69 IN | HEART RATE: 69 BPM | TEMPERATURE: 98.2 F | BODY MASS INDEX: 39.69 KG/M2 | SYSTOLIC BLOOD PRESSURE: 136 MMHG | WEIGHT: 268 LBS | OXYGEN SATURATION: 96 % | DIASTOLIC BLOOD PRESSURE: 78 MMHG

## 2022-05-20 DIAGNOSIS — R07.0 THROAT PAIN: Primary | ICD-10-CM

## 2022-05-20 DIAGNOSIS — J06.9 UPPER RESPIRATORY TRACT INFECTION, UNSPECIFIED TYPE: ICD-10-CM

## 2022-05-20 DIAGNOSIS — H10.33 ACUTE CONJUNCTIVITIS OF BOTH EYES, UNSPECIFIED ACUTE CONJUNCTIVITIS TYPE: ICD-10-CM

## 2022-05-20 DIAGNOSIS — R05.9 COUGH: ICD-10-CM

## 2022-05-20 LAB
DEPRECATED S PYO AG THROAT QL EIA: NEGATIVE
GROUP A STREP BY PCR: NOT DETECTED
SARS-COV-2 RNA RESP QL NAA+PROBE: NEGATIVE

## 2022-05-20 PROCEDURE — U0003 INFECTIOUS AGENT DETECTION BY NUCLEIC ACID (DNA OR RNA); SEVERE ACUTE RESPIRATORY SYNDROME CORONAVIRUS 2 (SARS-COV-2) (CORONAVIRUS DISEASE [COVID-19]), AMPLIFIED PROBE TECHNIQUE, MAKING USE OF HIGH THROUGHPUT TECHNOLOGIES AS DESCRIBED BY CMS-2020-01-R: HCPCS | Performed by: PHYSICIAN ASSISTANT

## 2022-05-20 PROCEDURE — 99213 OFFICE O/P EST LOW 20 MIN: CPT | Performed by: PHYSICIAN ASSISTANT

## 2022-05-20 PROCEDURE — U0005 INFEC AGEN DETEC AMPLI PROBE: HCPCS | Performed by: PHYSICIAN ASSISTANT

## 2022-05-20 PROCEDURE — 87651 STREP A DNA AMP PROBE: CPT | Performed by: PHYSICIAN ASSISTANT

## 2022-05-20 RX ORDER — BENZONATATE 200 MG/1
200 CAPSULE ORAL 3 TIMES DAILY PRN
Qty: 30 CAPSULE | Refills: 0 | Status: SHIPPED | OUTPATIENT
Start: 2022-05-20 | End: 2022-09-20

## 2022-05-20 RX ORDER — GENTAMICIN SULFATE 3 MG/ML
1-2 SOLUTION/ DROPS OPHTHALMIC 3 TIMES DAILY
Qty: 3 ML | Refills: 0 | Status: SHIPPED | OUTPATIENT
Start: 2022-05-20 | End: 2022-05-27

## 2022-05-20 ASSESSMENT — ENCOUNTER SYMPTOMS: COUGH: 1

## 2022-05-20 NOTE — PROGRESS NOTES
"  Assessment & Plan     Throat pain    - Streptococcus A Rapid Screen w/Reflex to PCR - Clinic Collect  - Group A Streptococcus PCR Throat Swab    Cough    - Symptomatic; Yes; 5/17/2022 COVID-19 Virus (Coronavirus) by PCR Nose  - benzonatate (TESSALON) 200 MG capsule; Take 1 capsule (200 mg) by mouth 3 times daily as needed for cough    Acute conjunctivitis of both eyes, unspecified acute conjunctivitis type    - gentamicin (GARAMYCIN) 0.3 % ophthalmic solution; Place 1-2 drops into both eyes 3 times daily for 7 days    Upper respiratory tract infection, unspecified type    - benzonatate (TESSALON) 200 MG capsule; Take 1 capsule (200 mg) by mouth 3 times daily as needed for cough             BMI:   Estimated body mass index is 39.58 kg/m  as calculated from the following:    Height as of this encounter: 1.753 m (5' 9\").    Weight as of this encounter: 121.6 kg (268 lb).   Weight management plan: Patient was referred to their PCP to discuss a diet and exercise plan.    Fluids rest use inhalers  Will notify of results via my chart as available    Return in about 1 week (around 5/27/2022) for recheck if not improving, regular primary provider.    Bell Livingston PA-C  [facility]    Anh Shields is a 72 year old who presents for the following health issues     Cough    History of Present Illness       Reason for visit:  Bad cough, runny nose, sore throat, red eyes. same as previous questions  Symptom onset:  3-7 days ago  Symptoms include:  Same as ab  Symptom intensity:  Moderate  Symptom progression:  Improving  Had these symptoms before:  No  What makes it worse:  Coughing and eyedrops  What makes it better:  No    He eats 0-1 servings of fruits and vegetables daily.He consumes 0 sweetened beverage(s) daily.He exercises with enough effort to increase his heart rate 9 or less minutes per day.  He exercises with enough effort to increase his heart rate 3 or less days per " "week.   He is taking medications regularly.     Also having right groin hotness, pain and difficulty walking when pain is present-  Intermittent, worse with coughing    COVID-19 Symptom Review  How many days ago did these symptoms start? 5-17-22  Home COVID test 5-13-22 and-5-17-22- both negative  Are any of the following symptoms significant for you?    New or worsening difficulty breathing? No    Worsening cough? Yes, I am coughing up mucus.-swallows so doesn't know if colored    Fever or chills? No    Headache: no    Sore throat: YES    Chest pain: no    Diarrhea: no    Body aches? no  Red draining itchy crusting eyes  Runny nose  What treatments has patient tried? Guaifenesin (mucinex) and eye drops   Does patient live in a nursing home, group home, or shelter? no  Does patient have a way to get food/medications during quarantined? Yes, I have a friend or family member who can help me.                  Review of Systems   Respiratory: Positive for cough.       Constitutional, HEENT, cardiovascular, pulmonary, gi and gu systems are negative, except as otherwise noted.      Objective    /78   Pulse 69   Temp 98.2  F (36.8  C) (Tympanic)   Ht 1.753 m (5' 9\")   Wt 121.6 kg (268 lb)   SpO2 96%   BMI 39.58 kg/m    Body mass index is 39.58 kg/m .  Physical Exam   GENERAL: healthy, alert and no distress  EYES: PERRL, EOMI and conjunctiva/corneas- conjunctival injection OU and yellow colored discharge present bilateral  HENT: ear canals and TM's normal, nose and mouth without ulcers or lesions  NECK: no adenopathy, no asymmetry, masses, or scars and thyroid normal to palpation  RESP: lungs clear to auscultation - no rales, rhonchi or wheezes  CV: regular rates and rhythm and normal S1 S2, no S3 or S4  SKIN: no suspicious lesions or rashes    Results for orders placed or performed in visit on 05/20/22 (from the past 24 hour(s))   Streptococcus A Rapid Screen w/Reflex to PCR - Clinic Collect    Specimen: " Throat; Swab   Result Value Ref Range    Group A Strep antigen Negative Negative

## 2022-05-31 DIAGNOSIS — I21.4 NSTEMI (NON-ST ELEVATED MYOCARDIAL INFARCTION) (H): ICD-10-CM

## 2022-05-31 DIAGNOSIS — I10 BENIGN ESSENTIAL HYPERTENSION: ICD-10-CM

## 2022-05-31 RX ORDER — METOPROLOL SUCCINATE 50 MG/1
TABLET, EXTENDED RELEASE ORAL
Qty: 135 TABLET | Refills: 3 | Status: CANCELLED | OUTPATIENT
Start: 2022-05-31

## 2022-06-01 RX ORDER — METOPROLOL SUCCINATE 50 MG/1
TABLET, EXTENDED RELEASE ORAL
Qty: 135 TABLET | Refills: 2 | Status: SHIPPED | OUTPATIENT
Start: 2022-06-01 | End: 2022-09-20

## 2022-06-09 ENCOUNTER — HOSPITAL ENCOUNTER (OUTPATIENT)
Dept: CARDIOLOGY | Facility: CLINIC | Age: 72
Discharge: HOME | End: 2022-06-09
Attending: INTERNAL MEDICINE | Admitting: INTERNAL MEDICINE
Payer: MEDICARE

## 2022-06-09 ENCOUNTER — OFFICE VISIT (OUTPATIENT)
Dept: CARDIOLOGY | Facility: CLINIC | Age: 72
End: 2022-06-09
Payer: MEDICARE

## 2022-06-09 VITALS
HEIGHT: 69 IN | BODY MASS INDEX: 39.75 KG/M2 | HEART RATE: 62 BPM | OXYGEN SATURATION: 94 % | WEIGHT: 268.4 LBS | DIASTOLIC BLOOD PRESSURE: 70 MMHG | SYSTOLIC BLOOD PRESSURE: 138 MMHG

## 2022-06-09 DIAGNOSIS — I35.0 AORTIC VALVE STENOSIS, ETIOLOGY OF CARDIAC VALVE DISEASE UNSPECIFIED: ICD-10-CM

## 2022-06-09 DIAGNOSIS — I48.0 PAROXYSMAL ATRIAL FIBRILLATION (H): Primary | ICD-10-CM

## 2022-06-09 LAB — LVEF ECHO: NORMAL

## 2022-06-09 PROCEDURE — 99215 OFFICE O/P EST HI 40 MIN: CPT | Mod: 25 | Performed by: INTERNAL MEDICINE

## 2022-06-09 PROCEDURE — 93306 TTE W/DOPPLER COMPLETE: CPT | Mod: 26 | Performed by: INTERNAL MEDICINE

## 2022-06-09 PROCEDURE — 93306 TTE W/DOPPLER COMPLETE: CPT

## 2022-06-09 NOTE — PATIENT INSTRUCTIONS
You were seen today in the Adult Congenital and Cardiovascular Genetics Clinic at the [facility].    Cardiology Providers you saw during your visit:  ESTER Du MD    Diagnosis:  BAV    Results:  ESTER Du MD reviewed the results of your EKG and echo testing today in clinic.    Recommendations:    Continue to eat a heart healthy, low salt diet.  Continue to get 20-30 minutes of aerobic activity, 4-5 days per week.  Examples of aerobic activity include walking, running, swimming, cycling, etc.  Continue to observe good oral hygiene, with regular dental visits.  No changes today      SBE prophylaxis:   Yes____  No__x__    Lifelong Bacterial Endocarditis Prophylaxis:  YES____  NO____    If YES is checked, follow the recommendations outlined below:  Take antibiotic(s) prior to recommended dental procedures and procedures on the respiratory tract or with infected skin, muscle or bones. SBE prophylaxis is not needed for routine GI and  procedures (ie. Colonoscopy or vaginal delivery)  Observe good oral hygiene daily, as advised by your dentist. Get regular professional dental care.  Keep cuts clean.  Infections should be treated promptly.  Symptoms of Infective Endocarditis could include: fever lasting more than 4-5 days or a recurrent fever that initially resolves but returns within 1-2 days)      Exercise restrictions:   Yes__X__  No____         If yes, list restrictions:  Must be allowed to rest if fatigued or SOB      Work restrictions:  Yes____  No_X___         If yes, list restrictions:    FASTING CHOLESTEROL was checked in the last 5 years YES__x_  NO___ (2021)  Continue to eat a heart healthy, low salt diet.         ____ Fasting lipid panel order today         ____ No changes in medications          ____ I recommend the following changes in your cholesterol medications.:          ____ Please follow up for cholesterol screening at your primary care physician      Follow-up:  Follow up  with Dr Du in 1 yea with echo prior    If you have questions or concerns please contact us at:    Sindhu Brito, KEITHN, RN    Melyssa Trevizo (Scheduling)  Nurse Care Coordinator     Clinic   Adult Congenital and CV Genetics   Adult Congenital and CV Genetic  [facility]   [facility]  (P) [phone]     (P) [phone]         (F) [phone]        For after hours urgent needs, call [phone] and ask to speak to the Adult Congenital Physician on call.  Mention Job Code 0401.    For emergencies call 911.    [facility]  [facility]   Clinics and Surgery Center  Mail Code [id number]  [address] [place]  [address]

## 2022-06-09 NOTE — NURSING NOTE
Cardiac Testing: Patient given instructions regarding  echocardiogram . Discussed purpose, preparation, procedure and when to expect results reported back to the patient. Patient demonstrated understanding of this information and agreed to call with further questions or concerns.    Med Reconcile: Reviewed and verified all current medications with the patient. The updated medication list was printed and given to the patient.    Right Heart Catheterization: Patient was instructed regarding right heart catheterization, including discussion of the procedure, preparation, intra-procedural steps, and recovery at home. Patient demonstrated understanding of this information and agreed to call with further questions or concerns.    Patient stated he understood all health information given and agreed to call with further questions or concerns.

## 2022-06-09 NOTE — LETTER
6/9/2022    Sinan Stephens MD  [address] [facility] [address]    RE: Giacomo Solis       Dear Colleague,     I had the pleasure of seeing Giacomo Solis in the [facility].  CARDIOLOGY CONSULTATION:    Mr. Solis is a very pleasant, 72-year-old gentleman whom I met in 11/2016 when he presented with an NSTEMI.  He was found to have moderate aortic valve stenosis with a mean gradient that has been between 18-22 and moderate disease in his coronary arteries with small vessels distally.  He has been managed medically and doing really well.        His weight has been stable with BMI around 40.  His blood pressure is under reasonable control.   He has a history of paroxysmal atrial fibrillation, but did have a GI bleed on Xarelto, and so it was recommended he not restart that.  He is in sinus rhythm today. His brother had his aortic valve replaced in 2018; it was likely bicuspid.  On Mr. Solis's valve, it is not clear whether it is bicuspid because of the degree of calcification, but given his brother's disease, this is likely the case.    His ascending aorta has been between 3.8-4.1, which is where it was on his echo today.  His mean gradient across his aortic valve has been creeping up and is currently 33 mmHg, which is where it was last year. He denies any chest pain, tightness or shortness of breath.  No syncope or pre-syncope.  He golfs for exercise; uses a cart. He goes to [place] for the Winter.  Mr. Solis does have moderate obstructive sleep apnea; he is using the CPAP.       PAST MEDICAL HISTORY:  Past Medical History:   Diagnosis Date     Atrial fibrillation (H)     paroxysmal     Basal cell carcinoma      COPD (chronic obstructive pulmonary disease) (H) 08/06/2019    FEV1 53 %     Coronary artery disease involving native coronary artery of native heart 11/30/16    NSTEMI (peak troponin 0.05); coronary angiogram showed modeerate nonocclusive disease: LM 30-40 %, LAD  30-40%, CX 30-40 %     Diverticulosis of colon 4/16/12    sigmoid diverticulosis per CT scan; episode acute diverticulitis per CT scan     Heart valve insufficiency      Lipoma of other skin and subcutaneous tissue      Lumbar spinal stenosis 9/1/10    lumbar spinal stenosis at L5     NSTEMI (non-ST elevated myocardial infarction) (H) 11/30/16    NSTEMI (peak troponin 0.05); no acute lesions on angiogram     Obesity (BMI 30-39.9) 7/9/13    BMI 38     Obstructive sleep apnea 10/7/14    per sleep study:  AHI 25/hr, desats to 84%. Supine /hr, only 6 minutes spent supine     Other and unspecified hyperlipidemia      Prediabetes 7/9/13    elevated fasting glucoses     Shingles 11/2017    dx when he was in [place]     Tubular adenoma of colon 8/13/13    3 adenomatous poltyps removed, 4 hyperplastic polyps removed     Unspecified essential hypertension        CURRENT MEDICATIONS:  Current Outpatient Medications   Medication Sig Dispense Refill     albuterol (PROAIR HFA/PROVENTIL HFA/VENTOLIN HFA) 108 (90 Base) MCG/ACT inhaler Inhale 2 puffs into the lungs every 4 hours as needed for shortness of breath / dyspnea or wheezing 18 g 11     amLODIPine (NORVASC) 5 MG tablet Take 1 tablet (5 mg) by mouth daily 90 tablet 3     aspirin 81 MG tablet Take 1 tablet by mouth daily.       benzonatate (TESSALON) 200 MG capsule Take 1 capsule (200 mg) by mouth 3 times daily as needed for cough 30 capsule 0     fluticasone-salmeterol (AIRDUO RESPICLICK) 113-14 MCG/ACT inhaler Inhale 1 puff into the lungs 2 times daily (Patient taking differently: Inhale 1 puff into the lungs 2 times daily as needed) 1 each 11     furosemide (LASIX) 20 MG tablet Take 2 tablets (40 mg) by mouth every morning 180 tablet 3     lisinopril (ZESTRIL) 40 MG tablet Take 1 tablet (40 mg) by mouth daily 90 tablet 3     metoprolol succinate ER (TOPROL XL) 50 MG 24 hr tablet TAKE 1 AND 1/2 TABLETS(75 MG) BY MOUTH EVERY MORNING 135 tablet 2     rosuvastatin  (CRESTOR) 40 MG tablet Take 1 tablet (40 mg) by mouth daily 90 tablet 3       PAST SURGICAL HISTORY:  Past Surgical History:   Procedure Laterality Date     COLONOSCOPY  13    3 adenomatous polyps and 4 hyperplastic polyps removed     HC TOOTH EXTRACTION W/FORCEP      4 wisdom teeth removed withou difficulty       ALLERGIES  No known allergies    FAMILY HX:  Family History   Problem Relation Age of Onset     Hypertension Mother         B:192     Colon Cancer Mother 84     Kidney Cancer Mother 95         age 95     Heart Disease Father         D: 70's  MI     Hypertension Brother         lipids as well     Heart Disease Brother         CABG x 3, mitral valve reaplcement     Colon Cancer Brother 68     Respiratory Brother         PRACHI     Myocardial Infarction Brother 69        sudden cardiac death     Diabetes No family hx of      Cerebrovascular Disease No family hx of        SOCIAL HX:  Social History     Socioeconomic History     Marital status:      Spouse name: None     Number of children: None     Years of education: None     Highest education level: None   Tobacco Use     Smoking status: Former Smoker     Packs/day: 0.75     Years: 20.00     Pack years: 15.00     Types: Cigarettes     Quit date: 2006     Years since quittin.0     Smokeless tobacco: Never Used     Tobacco comment: had quit for 1 year, smoked on and off for years, quit again    Substance and Sexual Activity     Alcohol use: Yes     Alcohol/week: 4.0 standard drinks     Types: 4 Glasses of wine per week     Comment: 2 glasses of wine daily      Drug use: No     Sexual activity: Not Currently   Other Topics Concern     Special Diet No     Exercise No       ROS:  Constitutional: No fever, chills, or sweats. No weight gain/loss.   ENT: No visual disturbance, ear ache, epistaxis, sore throat.   Allergies/Immunologic: Negative.   Respiratory: No cough, hemoptysis.   Cardiovascular: As per HPI.   GI: No nausea,  "vomiting, hematemesis, melena, or hematochezia.   : No urinary frequency, dysuria, or hematuria.   Integument: Negative.   Psychiatric: Negative.   Neuro: Negative.   Endocrinology: Negative.   Musculoskeletal: No myalgia.    VITAL SIGNS:  /70   Pulse 62   Ht 1.753 m (5' 9\")   Wt 121.7 kg (268 lb 6.4 oz)   SpO2 94%   BMI 39.64 kg/m    Body mass index is 39.64 kg/m .  Wt Readings from Last 2 Encounters:   06/09/22 121.7 kg (268 lb 6.4 oz)   05/20/22 121.6 kg (268 lb)       PHYSICAL EXAM  Giacomo Solis IS A 72 year old male.in no acute distress.    Lungs: CTA.  Cor: RRR. Normal S1 and S2. Mid-late systolic murmur.  Abd: Soft, nontender, nondistended.   Extremities: No C/C/E.     LABS    Lab Results   Component Value Date    WBC 5.7 06/27/2021     Lab Results   Component Value Date    RBC 5.63 06/27/2021     Lab Results   Component Value Date    HGB 16.0 06/27/2021     Lab Results   Component Value Date    HCT 48.8 06/27/2021     No components found for: MCT  Lab Results   Component Value Date    MCV 87 06/27/2021     Lab Results   Component Value Date    MCH 28.4 06/27/2021     Lab Results   Component Value Date    MCHC 32.8 06/27/2021     Lab Results   Component Value Date    RDW 14.6 06/27/2021     Lab Results   Component Value Date     06/27/2021      Recent Labs   Lab Test 06/27/21  1456 07/31/20  1008    139   POTASSIUM 3.8 4.2   CHLORIDE 104 106   CO2 28 27   ANIONGAP 5 6   GLC 89 109*   BUN 12 14   CR 0.83 0.77   LASHAY 8.7 8.5     Recent Labs   Lab Test 08/10/21  1314 08/19/20  0818 02/22/16  0743 06/23/15  0803 06/25/14  0712   CHOL 160 142   < > 184 188   HDL 46 42   < > 36* 39*   LDL 94 71   < > 89 105   TRIG 100 143   < > 295* 218*   CHOLHDLRATIO  --   --   --  5.1* 4.8   NHDL 114 100   < >  --   --     < > = values in this interval not displayed.           IMPRESSION, REPORT, PLAN:   1.  Moderate coronary artery disease, managed medically with last cath 12/2016, currently " asymptomatic.   2.  Hypertension, reasonably controlled.   3.  History of GI bleed on Xarelto, currently not anticoagulated as a result.   4.  Paroxysmal atrial fibrillation.   5.  Hyperlipidemia, well controlled.   6.  Obesity.   7.  Moderate bicuspid aortic valve stenosis with a mean gradient of 33 mmHg.     8.  Ascending aortic dilatation at 4 cm.   9.  Obstructive sleep apnea using CPAP.      DISCUSSION:  It was a pleasure seeing Mr. Solis in followup.  He is doing well from a cardiovascular standpoint and does not have any concerning symptoms.  We discussed in detail diet and exercise.  His valve gradient is stable.   We will see him in a year but he will let us know if he has any concerning symptoms before then, which we discussed could present should his valve disease progress before then.   It was a pleasure to see him today.  Please do not hesitate to contact me with any questions or concerns.      SADI DU MD    40 minutes face-face, documentation and review of records on day of visit    Thank you for allowing me to participate in the care of your patient.      Sincerely,     Sadi Du MD     [facility]  cc:   No referring provider defined for this encounter.

## 2022-06-09 NOTE — PROGRESS NOTES
CARDIOLOGY CONSULTATION:    Mr. Solis is a very pleasant, 72-year-old gentleman whom I met in 11/2016 when he presented with an NSTEMI.  He was found to have moderate aortic valve stenosis with a mean gradient that has been between 18-22 and moderate disease in his coronary arteries with small vessels distally.  He has been managed medically and doing really well.        His weight has been stable with BMI around 40.  His blood pressure is under reasonable control.   He has a history of paroxysmal atrial fibrillation, but did have a GI bleed on Xarelto, and so it was recommended he not restart that.  He is in sinus rhythm today. His brother had his aortic valve replaced in 2018; it was likely bicuspid.  On Mr. Solis's valve, it is not clear whether it is bicuspid because of the degree of calcification, but given his brother's disease, this is likely the case.    His ascending aorta has been between 3.8-4.1, which is where it was on his echo today.  His mean gradient across his aortic valve has been creeping up and is currently 33 mmHg, which is where it was last year. He denies any chest pain, tightness or shortness of breath.  No syncope or pre-syncope.  He golfs for exercise; uses a cart. He goes to [place] for the Winter.  Mr. Solis does have moderate obstructive sleep apnea; he is using the CPAP.       PAST MEDICAL HISTORY:  Past Medical History:   Diagnosis Date     Atrial fibrillation (H)     paroxysmal     Basal cell carcinoma      COPD (chronic obstructive pulmonary disease) (H) 08/06/2019    FEV1 53 %     Coronary artery disease involving native coronary artery of native heart 11/30/16    NSTEMI (peak troponin 0.05); coronary angiogram showed modeerate nonocclusive disease: LM 30-40 %, LAD 30-40%, CX 30-40 %     Diverticulosis of colon 4/16/12    sigmoid diverticulosis per CT scan; episode acute diverticulitis per CT scan     Heart valve insufficiency      Lipoma of other skin and subcutaneous  tissue      Lumbar spinal stenosis 9/1/10    lumbar spinal stenosis at L5     NSTEMI (non-ST elevated myocardial infarction) (H) 11/30/16    NSTEMI (peak troponin 0.05); no acute lesions on angiogram     Obesity (BMI 30-39.9) 7/9/13    BMI 38     Obstructive sleep apnea 10/7/14    per sleep study:  AHI 25/hr, desats to 84%. Supine /hr, only 6 minutes spent supine     Other and unspecified hyperlipidemia      Prediabetes 7/9/13    elevated fasting glucoses     Shingles 11/2017    dx when he was in [place]     Tubular adenoma of colon 8/13/13    3 adenomatous poltyps removed, 4 hyperplastic polyps removed     Unspecified essential hypertension        CURRENT MEDICATIONS:  Current Outpatient Medications   Medication Sig Dispense Refill     albuterol (PROAIR HFA/PROVENTIL HFA/VENTOLIN HFA) 108 (90 Base) MCG/ACT inhaler Inhale 2 puffs into the lungs every 4 hours as needed for shortness of breath / dyspnea or wheezing 18 g 11     amLODIPine (NORVASC) 5 MG tablet Take 1 tablet (5 mg) by mouth daily 90 tablet 3     aspirin 81 MG tablet Take 1 tablet by mouth daily.       benzonatate (TESSALON) 200 MG capsule Take 1 capsule (200 mg) by mouth 3 times daily as needed for cough 30 capsule 0     fluticasone-salmeterol (AIRDUO RESPICLICK) 113-14 MCG/ACT inhaler Inhale 1 puff into the lungs 2 times daily (Patient taking differently: Inhale 1 puff into the lungs 2 times daily as needed) 1 each 11     furosemide (LASIX) 20 MG tablet Take 2 tablets (40 mg) by mouth every morning 180 tablet 3     lisinopril (ZESTRIL) 40 MG tablet Take 1 tablet (40 mg) by mouth daily 90 tablet 3     metoprolol succinate ER (TOPROL XL) 50 MG 24 hr tablet TAKE 1 AND 1/2 TABLETS(75 MG) BY MOUTH EVERY MORNING 135 tablet 2     rosuvastatin (CRESTOR) 40 MG tablet Take 1 tablet (40 mg) by mouth daily 90 tablet 3       PAST SURGICAL HISTORY:  Past Surgical History:   Procedure Laterality Date     COLONOSCOPY  8/13/13    3 adenomatous polyps and 4  hyperplastic polyps removed     HC TOOTH EXTRACTION W/FORCEP      4 wisdom teeth removed withou difficulty       ALLERGIES  No known allergies    FAMILY HX:  Family History   Problem Relation Age of Onset     Hypertension Mother         B:1920     Colon Cancer Mother 84     Kidney Cancer Mother 95         age 95     Heart Disease Father         D: 70's  MI     Hypertension Brother         lipids as well     Heart Disease Brother         CABG x 3, mitral valve reaplcement     Colon Cancer Brother 68     Respiratory Brother         PRACHI     Myocardial Infarction Brother 69        sudden cardiac death     Diabetes No family hx of      Cerebrovascular Disease No family hx of        SOCIAL HX:  Social History     Socioeconomic History     Marital status:      Spouse name: None     Number of children: None     Years of education: None     Highest education level: None   Tobacco Use     Smoking status: Former Smoker     Packs/day: 0.75     Years: 20.00     Pack years: 15.00     Types: Cigarettes     Quit date: 2006     Years since quittin.0     Smokeless tobacco: Never Used     Tobacco comment: had quit for 1 year, smoked on and off for years, quit again    Substance and Sexual Activity     Alcohol use: Yes     Alcohol/week: 4.0 standard drinks     Types: 4 Glasses of wine per week     Comment: 2 glasses of wine daily      Drug use: No     Sexual activity: Not Currently   Other Topics Concern     Special Diet No     Exercise No       ROS:  Constitutional: No fever, chills, or sweats. No weight gain/loss.   ENT: No visual disturbance, ear ache, epistaxis, sore throat.   Allergies/Immunologic: Negative.   Respiratory: No cough, hemoptysis.   Cardiovascular: As per HPI.   GI: No nausea, vomiting, hematemesis, melena, or hematochezia.   : No urinary frequency, dysuria, or hematuria.   Integument: Negative.   Psychiatric: Negative.   Neuro: Negative.   Endocrinology: Negative.   Musculoskeletal: No  "myalgia.    VITAL SIGNS:  /70   Pulse 62   Ht 1.753 m (5' 9\")   Wt 121.7 kg (268 lb 6.4 oz)   SpO2 94%   BMI 39.64 kg/m    Body mass index is 39.64 kg/m .  Wt Readings from Last 2 Encounters:   06/09/22 121.7 kg (268 lb 6.4 oz)   05/20/22 121.6 kg (268 lb)       PHYSICAL EXAM  Giacomo Solis IS A 72 year old male.in no acute distress.    Lungs: CTA.  Cor: RRR. Normal S1 and S2. Mid-late systolic murmur.  Abd: Soft, nontender, nondistended.   Extremities: No C/C/E.     LABS    Lab Results   Component Value Date    WBC 5.7 06/27/2021     Lab Results   Component Value Date    RBC 5.63 06/27/2021     Lab Results   Component Value Date    HGB 16.0 06/27/2021     Lab Results   Component Value Date    HCT 48.8 06/27/2021     No components found for: MCT  Lab Results   Component Value Date    MCV 87 06/27/2021     Lab Results   Component Value Date    MCH 28.4 06/27/2021     Lab Results   Component Value Date    MCHC 32.8 06/27/2021     Lab Results   Component Value Date    RDW 14.6 06/27/2021     Lab Results   Component Value Date     06/27/2021      Recent Labs   Lab Test 06/27/21  1456 07/31/20  1008    139   POTASSIUM 3.8 4.2   CHLORIDE 104 106   CO2 28 27   ANIONGAP 5 6   GLC 89 109*   BUN 12 14   CR 0.83 0.77   LASHAY 8.7 8.5     Recent Labs   Lab Test 08/10/21  1314 08/19/20  0818 02/22/16  0743 06/23/15  0803 06/25/14  0712   CHOL 160 142   < > 184 188   HDL 46 42   < > 36* 39*   LDL 94 71   < > 89 105   TRIG 100 143   < > 295* 218*   CHOLHDLRATIO  --   --   --  5.1* 4.8   NHDL 114 100   < >  --   --     < > = values in this interval not displayed.           IMPRESSION, REPORT, PLAN:   1.  Moderate coronary artery disease, managed medically with last cath 12/2016, currently asymptomatic.   2.  Hypertension, reasonably controlled.   3.  History of GI bleed on Xarelto, currently not anticoagulated as a result.   4.  Paroxysmal atrial fibrillation.   5.  Hyperlipidemia, well controlled.   6. "  Obesity.   7.  Moderate bicuspid aortic valve stenosis with a mean gradient of 33 mmHg.     8.  Ascending aortic dilatation at 4 cm.   9.  Obstructive sleep apnea using CPAP.      DISCUSSION:  It was a pleasure seeing Mr. Solis in followup.  He is doing well from a cardiovascular standpoint and does not have any concerning symptoms.  We discussed in detail diet and exercise.  His valve gradient is stable.   We will see him in a year but he will let us know if he has any concerning symptoms before then, which we discussed could present should his valve disease progress before then.   It was a pleasure to see him today.  Please do not hesitate to contact me with any questions or concerns.      SADI THIBODEAUX MD    40 minutes face-face, documentation and review of records on day of visit

## 2022-09-13 DIAGNOSIS — E78.5 HYPERLIPIDEMIA LDL GOAL <70: ICD-10-CM

## 2022-09-13 DIAGNOSIS — I21.4 NSTEMI (NON-ST ELEVATED MYOCARDIAL INFARCTION) (H): ICD-10-CM

## 2022-09-16 RX ORDER — ROSUVASTATIN CALCIUM 40 MG/1
TABLET, COATED ORAL
Qty: 90 TABLET | Refills: 0 | Status: SHIPPED | OUTPATIENT
Start: 2022-09-16 | End: 2022-09-20

## 2022-09-16 NOTE — TELEPHONE ENCOUNTER
Routing refill request to provider for review/approval because:  LDL Cholesterol Calculated   Date Value Ref Range Status   08/10/2021 94 <=100 mg/dL Final     Comment:     Age 0-19 years:  Desirable: 0-110 mg/dL   Borderline high: 110-129 mg/dL   High: >= 130 mg/dL    Age 20 years and older:  Desirable: <100mg/dL  Above desirable: 100-129 mg/dL   Borderline high: 130-159 mg/dL   High: 160-189 mg/dL   Very high: >= 190 mg/dL   08/19/2020 71 <100 mg/dL Final     Comment:     Desirable:       <100 mg/dl     please route to team to contact patient for appointment       Avel Reynolds RN  [facility] Triage Nurse

## 2022-09-17 ASSESSMENT — ENCOUNTER SYMPTOMS
COUGH: 0
PALPITATIONS: 0
DYSURIA: 0
MYALGIAS: 1
JOINT SWELLING: 0
HEMATURIA: 0
ARTHRALGIAS: 1
HEMATOCHEZIA: 0
HEADACHES: 0
HEARTBURN: 1
PARESTHESIAS: 0
SHORTNESS OF BREATH: 1
FEVER: 0
FREQUENCY: 0
NERVOUS/ANXIOUS: 0
ABDOMINAL PAIN: 0
EYE PAIN: 0
SORE THROAT: 0
NAUSEA: 0
DIARRHEA: 0
CONSTIPATION: 0
WEAKNESS: 1
CHILLS: 0
DIZZINESS: 0

## 2022-09-17 ASSESSMENT — ACTIVITIES OF DAILY LIVING (ADL): CURRENT_FUNCTION: NO ASSISTANCE NEEDED

## 2022-09-20 ENCOUNTER — OFFICE VISIT (OUTPATIENT)
Dept: INTERNAL MEDICINE | Facility: CLINIC | Age: 72
End: 2022-09-20
Payer: MEDICARE

## 2022-09-20 VITALS
WEIGHT: 265 LBS | TEMPERATURE: 98.3 F | BODY MASS INDEX: 39.25 KG/M2 | HEART RATE: 59 BPM | HEIGHT: 69 IN | DIASTOLIC BLOOD PRESSURE: 79 MMHG | SYSTOLIC BLOOD PRESSURE: 132 MMHG | OXYGEN SATURATION: 96 %

## 2022-09-20 DIAGNOSIS — Z13.220 SCREENING FOR HYPERLIPIDEMIA: ICD-10-CM

## 2022-09-20 DIAGNOSIS — I10 BENIGN ESSENTIAL HYPERTENSION: ICD-10-CM

## 2022-09-20 DIAGNOSIS — I77.819 AORTIC ECTASIA, UNSPECIFIED SITE (H): ICD-10-CM

## 2022-09-20 DIAGNOSIS — E66.01 SEVERE OBESITY (BMI 35.0-39.9) WITH COMORBIDITY (H): ICD-10-CM

## 2022-09-20 DIAGNOSIS — R60.0 BILATERAL LOWER EXTREMITY EDEMA: ICD-10-CM

## 2022-09-20 DIAGNOSIS — J44.9 CHRONIC OBSTRUCTIVE PULMONARY DISEASE, UNSPECIFIED COPD TYPE (H): ICD-10-CM

## 2022-09-20 DIAGNOSIS — E78.5 HYPERLIPIDEMIA LDL GOAL <70: ICD-10-CM

## 2022-09-20 DIAGNOSIS — Z00.00 ENCOUNTER FOR MEDICARE ANNUAL WELLNESS EXAM: Primary | ICD-10-CM

## 2022-09-20 DIAGNOSIS — Z12.5 SCREENING FOR PROSTATE CANCER: ICD-10-CM

## 2022-09-20 DIAGNOSIS — I21.4 NSTEMI (NON-ST ELEVATED MYOCARDIAL INFARCTION) (H): ICD-10-CM

## 2022-09-20 DIAGNOSIS — Z12.11 SCREEN FOR COLON CANCER: ICD-10-CM

## 2022-09-20 LAB
ALT SERPL W P-5'-P-CCNC: 25 U/L (ref 0–70)
ANION GAP SERPL CALCULATED.3IONS-SCNC: 8 MMOL/L (ref 3–14)
AST SERPL W P-5'-P-CCNC: 15 U/L (ref 0–45)
BUN SERPL-MCNC: 16 MG/DL (ref 7–30)
CALCIUM SERPL-MCNC: 9.2 MG/DL (ref 8.5–10.1)
CHLORIDE BLD-SCNC: 105 MMOL/L (ref 94–109)
CHOLEST SERPL-MCNC: 151 MG/DL
CO2 SERPL-SCNC: 26 MMOL/L (ref 20–32)
CREAT SERPL-MCNC: 0.81 MG/DL (ref 0.66–1.25)
ERYTHROCYTE [DISTWIDTH] IN BLOOD BY AUTOMATED COUNT: 14.5 % (ref 10–15)
FASTING STATUS PATIENT QL REPORTED: YES
GFR SERPL CREATININE-BSD FRML MDRD: >90 ML/MIN/1.73M2
GLUCOSE BLD-MCNC: 132 MG/DL (ref 70–99)
HCT VFR BLD AUTO: 47.7 % (ref 40–53)
HDLC SERPL-MCNC: 39 MG/DL
HGB BLD-MCNC: 15.7 G/DL (ref 13.3–17.7)
LDLC SERPL CALC-MCNC: 81 MG/DL
MCH RBC QN AUTO: 28.1 PG (ref 26.5–33)
MCHC RBC AUTO-ENTMCNC: 32.9 G/DL (ref 31.5–36.5)
MCV RBC AUTO: 85 FL (ref 78–100)
NONHDLC SERPL-MCNC: 112 MG/DL
PLATELET # BLD AUTO: 198 10E3/UL (ref 150–450)
POTASSIUM BLD-SCNC: 3.8 MMOL/L (ref 3.4–5.3)
PSA SERPL-MCNC: 0.86 UG/L (ref 0–4)
RBC # BLD AUTO: 5.59 10E6/UL (ref 4.4–5.9)
SODIUM SERPL-SCNC: 139 MMOL/L (ref 133–144)
TRIGL SERPL-MCNC: 157 MG/DL
WBC # BLD AUTO: 5.3 10E3/UL (ref 4–11)

## 2022-09-20 PROCEDURE — 84460 ALANINE AMINO (ALT) (SGPT): CPT | Performed by: INTERNAL MEDICINE

## 2022-09-20 PROCEDURE — 99214 OFFICE O/P EST MOD 30 MIN: CPT | Mod: 25 | Performed by: INTERNAL MEDICINE

## 2022-09-20 PROCEDURE — 80061 LIPID PANEL: CPT | Performed by: INTERNAL MEDICINE

## 2022-09-20 PROCEDURE — 85027 COMPLETE CBC AUTOMATED: CPT | Performed by: INTERNAL MEDICINE

## 2022-09-20 PROCEDURE — 84450 TRANSFERASE (AST) (SGOT): CPT | Performed by: INTERNAL MEDICINE

## 2022-09-20 PROCEDURE — 80048 BASIC METABOLIC PNL TOTAL CA: CPT | Performed by: INTERNAL MEDICINE

## 2022-09-20 PROCEDURE — 36415 COLL VENOUS BLD VENIPUNCTURE: CPT | Performed by: INTERNAL MEDICINE

## 2022-09-20 PROCEDURE — G0439 PPPS, SUBSEQ VISIT: HCPCS | Performed by: INTERNAL MEDICINE

## 2022-09-20 PROCEDURE — G0103 PSA SCREENING: HCPCS | Performed by: INTERNAL MEDICINE

## 2022-09-20 RX ORDER — ALBUTEROL SULFATE 90 UG/1
2 AEROSOL, METERED RESPIRATORY (INHALATION) EVERY 4 HOURS PRN
Qty: 18 G | Refills: 11 | Status: SHIPPED | OUTPATIENT
Start: 2022-09-20 | End: 2023-06-09

## 2022-09-20 RX ORDER — FLUTICASONE PROPIONATE AND SALMETEROL 113; 14 UG/1; UG/1
1 POWDER, METERED RESPIRATORY (INHALATION) 2 TIMES DAILY
Qty: 1 EACH | Refills: 11 | Status: SHIPPED | OUTPATIENT
Start: 2022-09-20 | End: 2023-06-09

## 2022-09-20 RX ORDER — ROSUVASTATIN CALCIUM 40 MG/1
40 TABLET, COATED ORAL DAILY
Qty: 90 TABLET | Refills: 3 | Status: SHIPPED | OUTPATIENT
Start: 2022-09-20 | End: 2023-06-09

## 2022-09-20 RX ORDER — AMLODIPINE BESYLATE 5 MG/1
5 TABLET ORAL DAILY
Qty: 90 TABLET | Refills: 3 | Status: SHIPPED | OUTPATIENT
Start: 2022-09-20 | End: 2023-09-29

## 2022-09-20 RX ORDER — FUROSEMIDE 20 MG
40 TABLET ORAL EVERY MORNING
Qty: 180 TABLET | Refills: 3 | Status: SHIPPED | OUTPATIENT
Start: 2022-09-20 | End: 2023-09-29

## 2022-09-20 RX ORDER — LISINOPRIL 40 MG/1
40 TABLET ORAL DAILY
Qty: 90 TABLET | Refills: 3 | Status: SHIPPED | OUTPATIENT
Start: 2022-09-20 | End: 2022-09-26

## 2022-09-20 RX ORDER — METOPROLOL SUCCINATE 50 MG/1
75 TABLET, EXTENDED RELEASE ORAL DAILY
Qty: 135 TABLET | Refills: 3 | Status: SHIPPED | OUTPATIENT
Start: 2022-09-20 | End: 2023-09-29

## 2022-09-20 ASSESSMENT — ENCOUNTER SYMPTOMS
CHILLS: 0
HEMATOCHEZIA: 0
PALPITATIONS: 0
DIZZINESS: 0
DYSURIA: 0
FREQUENCY: 0
HEADACHES: 0
PARESTHESIAS: 0
DIARRHEA: 0
MYALGIAS: 1
CONSTIPATION: 0
ARTHRALGIAS: 1
SORE THROAT: 0
FEVER: 0
JOINT SWELLING: 0
NAUSEA: 0
WEAKNESS: 1
SHORTNESS OF BREATH: 1
COUGH: 0
HEMATURIA: 0
NERVOUS/ANXIOUS: 0
HEARTBURN: 1
EYE PAIN: 0
ABDOMINAL PAIN: 0

## 2022-09-20 ASSESSMENT — ACTIVITIES OF DAILY LIVING (ADL): CURRENT_FUNCTION: NO ASSISTANCE NEEDED

## 2022-09-20 NOTE — PROGRESS NOTES
"    The patient was provided with suggestions to help him develop a healthy physical lifestyle.  He is at risk for lack of exercise and has been provided with information to increase physical activity for the benefit of his well-being.  The patient was counseled and encouraged to consider modifying their diet and eating habits. He was provided with information on recommended healthy diet options.  The patient was provided with written information regarding signs of hearing loss.        Answers for HPI/ROS submitted by the patient on 9/17/2022  In general, how would you rate your overall physical health?: fair  Frequency of exercise:: 1 day/week  Do you usually eat at least 4 servings of fruit and vegetables a day, include whole grains & fiber, and avoid regularly eating high fat or \"junk\" foods? : No  Taking medications regularly:: Yes  Medication side effects:: None  Activities of Daily Living: no assistance needed  Home safety: no safety concerns identified  Hearing Impairment:: difficulty following a conversation in a noisy restaurant or crowded room, feel that people are mumbling or not speaking clearly, need to ask people to speak up or repeat themselves, difficulty understanding soft or whispered speech, difficulty understanding speech on the telephone  In the past 6 months, have you been bothered by leaking of urine?: No  abdominal pain: No  Blood in stool: No  Blood in urine: No  chest pain: No  chills: No  congestion: No  constipation: No  cough: No  diarrhea: No  dizziness: No  ear pain: No  eye pain: No  nervous/anxious: No  fever: No  frequency: No  genital sores: No  headaches: No  hearing loss: Yes  heartburn: Yes  arthralgias: Yes  joint swelling: No  peripheral edema: No  mood changes: No  myalgias: Yes  nausea: No  dysuria: No  palpitations: No  Skin sensation changes: No  sore throat: No  urgency: No  rash: No  shortness of breath: Yes  visual disturbance: Yes  weakness: Yes  impotence: Yes  penile " discharge: No  In general, how would you rate your overall mental or emotional health?: excellent  Additional concerns today:: No  Duration of exercise:: Less than 15 minutes

## 2022-09-20 NOTE — PROGRESS NOTES
"SUBJECTIVE:   Cornelius is a 72 year old who presents for Preventive Visit.    Patient has been advised of split billing requirements and indicates understanding: Yes  Are you in the first 12 months of your Medicare coverage?  No    Healthy Habits:     In general, how would you rate your overall health?  Fair    Frequency of exercise:  1 day/week    Duration of exercise:  Less than 15 minutes    Do you usually eat at least 4 servings of fruit and vegetables a day, include whole grains    & fiber and avoid regularly eating high fat or \"junk\" foods?  No    Taking medications regularly:  Yes    Medication side effects:  None    Ability to successfully perform activities of daily living:  No assistance needed    Home Safety:  No safety concerns identified    Hearing Impairment:  Difficulty following a conversation in a noisy restaurant or crowded room, feel that people are mumbling or not speaking clearly, need to ask people to speak up or repeat themselves, difficulty understanding soft or whispered speech and difficulty understanding speech on the telephone    In the past 6 months, have you been bothered by leaking of urine?  No    In general, how would you rate your overall mental or emotional health?  Excellent      PHQ-2 Total Score: 0    Additional concerns today:  No    Do you feel safe in your environment? Yes    Have you ever done Advance Care Planning? (For example, a Health Directive, POLST, or a discussion with a medical provider or your loved ones about your wishes): No, advance care planning information given to patient to review.  Patient plans to discuss their wishes with loved ones or provider.         Fall risk  Fallen 2 or more times in the past year?: No  Any fall with injury in the past year?: No    Cognitive Screening   1) Repeat 3 items (Leader, Season, Table)    2) Clock draw: NORMAL  3) 3 item recall: Recalls 3 objects  Results: 3 items recalled: COGNITIVE IMPAIRMENT LESS LIKELY    Mini-CogTM " Copyright SERGEY Watters. Licensed by the author for use in [facility]; reprinted with permission (issa@[facility]). All rights reserved.      Do you have sleep apnea, excessive snoring or daytime drowsiness?: yes    Reviewed and updated as needed this visit by clinical staff   Tobacco  Allergies  Meds  Problems               Reviewed and updated as needed this visit by Provider    Allergies  Meds  Problems              Social History     Tobacco Use     Smoking status: Former Smoker     Packs/day: 0.75     Years: 20.00     Pack years: 15.00     Types: Cigarettes     Quit date: 2006     Years since quittin.3     Smokeless tobacco: Never Used     Tobacco comment: had quit for 1 year, smoked on and off for years, quit again    Substance Use Topics     Alcohol use: Yes     Alcohol/week: 4.0 standard drinks     Types: 4 Glasses of wine per week     Comment: 2 glasses of wine daily        AUDIT - Alcohol Use Disorders Identification Test - Reproduced from the World Health Organization Audit 2001 (Second Edition) 2022   1.  How often do you have a drink containing alcohol? 4 or more times a week   2.  How many drinks containing alcohol do you have on a typical day when you are drinking? 3 or 4   3.  How often do you have five or more drinks on one occasion? Less than monthly   4.  How often during the last year have you found that you were not able to stop drinking once you had started? Never   5.  How often during the last year have you failed to do what was normally expected of you because of drinking? Never   6.  How often during the last year have you needed a first drink in the morning to get yourself going after a heavy drinking session? Never   7.  How often during the last year have you had a feeling of guilt or remorse after drinking? Never   8.  How often during the last year have you been unable to remember what happened the night before because of your drinking? Never   9.  Have  you or someone else been injured because of your drinking? No   10. Has a relative, friend, doctor or other health care worker been concerned about your drinking or suggested you cut down? No   TOTAL SCORE 6         1.    Hypertension:  History of hypertension, on medication.  No reported side effects from medications.    Reviewed last 6 BP readings in chart:  BP Readings from Last 6 Encounters:   09/20/22 132/79   06/09/22 138/70   05/20/22 136/78   09/22/21 135/80   08/10/21 135/85   07/30/21 132/80     No active cardiac complaints or symptoms with exercise.       2.  Prior of coronary artery disease as listed in medical history.  No current or recent cardiovascaulr symptoms.   No shortness of breath, no episodes of chest pain/pressure, no dyspnea on exertion, no changes in his abiliy to perform physical exertion or tasks.  Takes the same amount of time to perform similar physical tasks.        3.  History of aortic ectasia.      4.    The patient has had a history of ongoing obesity.  Reviewed the weigth curves.   Their current BMI is:  Body mass index is 39.13 kg/m .  Reviewed previous attempts at weight loss which have not been successful in producing prolonged weigth loss.   Discussed current eating and exercise habits.     Reviewed weights in chart:  Wt Readings from Last 10 Encounters:   09/20/22 120.2 kg (265 lb)   06/09/22 121.7 kg (268 lb 6.4 oz)   05/20/22 121.6 kg (268 lb)   09/22/21 120.7 kg (266 lb)   08/10/21 122.5 kg (270 lb)   07/30/21 122.5 kg (270 lb)   06/27/21 122.5 kg (270 lb)   07/31/20 120.7 kg (266 lb)   08/29/19 123.4 kg (272 lb)   08/06/19 123.4 kg (272 lb)          5.  COPD remains stable.  Has not had any recent breathing troubles beyond usual baseline.  Has not any acute respiratory events.  Remains with intermittent cough, mild shortness of breath with overexertion as per usual.  Using medication as directed with reported side effects.     Current providers sharing in care for this  patient include:   Patient Care Team:  Sinan Stephens MD as PCP - General  Sinan Stephens MD as Assigned PCP  Duc Du MD as Assigned Heart and Vascular Provider    The following health maintenance items are reviewed in Epic and correct as of today:  Health Maintenance   Topic Date Due     COPD ACTION PLAN  Never done     ZOSTER IMMUNIZATION (1 of 2) Never done     COLORECTAL CANCER SCREENING  08/02/2020     COVID-19 Vaccine (4 - Booster for Pfizer series) 11/09/2021     LIPID  08/10/2022     INFLUENZA VACCINE (1) 09/01/2022     MEDICARE ANNUAL WELLNESS VISIT  09/20/2023     ANNUAL REVIEW OF HM ORDERS  09/20/2023     FALL RISK ASSESSMENT  09/20/2023     DTAP/TDAP/TD IMMUNIZATION (3 - Td or Tdap) 09/18/2027     ADVANCE CARE PLANNING  09/20/2027     SPIROMETRY  Completed     HEPATITIS C SCREENING  Completed     PHQ-2 (once per calendar year)  Completed     Pneumococcal Vaccine: 65+ Years  Completed     AORTIC ANEURYSM SCREENING (SYSTEM ASSIGNED)  Completed     IPV IMMUNIZATION  Aged Out     MENINGITIS IMMUNIZATION  Aged Out     HEPATITIS B IMMUNIZATION  Aged Out         **I reviewed the information recorded in the patient's EPIC chart (including but not limited to medical history, surgical history, family history, problem list, medication list, and allergy list) and updated the information as indicated based on the patients reported information.             Review of Systems   Constitutional: Negative for chills and fever.   HENT: Positive for hearing loss. Negative for congestion, ear pain and sore throat.    Eyes: Positive for visual disturbance. Negative for pain.   Respiratory: Positive for shortness of breath. Negative for cough.    Cardiovascular: Negative for chest pain, palpitations and peripheral edema.   Gastrointestinal: Positive for heartburn. Negative for abdominal pain, constipation, diarrhea, hematochezia and nausea.   Genitourinary: Positive for impotence. Negative for  "dysuria, frequency, genital sores, hematuria, penile discharge and urgency.   Musculoskeletal: Positive for arthralgias and myalgias. Negative for joint swelling.   Skin: Negative for rash.   Neurological: Positive for weakness. Negative for dizziness, headaches and paresthesias.   Psychiatric/Behavioral: Negative for mood changes. The patient is not nervous/anxious.      Constitutional, HEENT, cardiovascular, pulmonary, gi and gu systems are negative, except as otherwise noted.    OBJECTIVE:   /79   Pulse 59   Temp 98.3  F (36.8  C) (Oral)   Ht 1.753 m (5' 9\")   Wt 120.2 kg (265 lb)   SpO2 96%   BMI 39.13 kg/m   Estimated body mass index is 39.13 kg/m  as calculated from the following:    Height as of this encounter: 1.753 m (5' 9\").    Weight as of this encounter: 120.2 kg (265 lb).  Physical Exam  GENERAL alert and no distress  EYES:  Normal sclera,conjunctiva, EOMI  HENT: oral and posterior pharynx without lesions or erythema, facies symmetric  NECK: Neck supple. No LAD, without thyroidmegaly.  RESP: Clear to ausculation bilaterally without wheezes or crackles. Normal BS in all fields.  CV: RRR normal S1S2 without murmurs, rubs or gallops.  LYMPH: no cervical lymph adenopathy appreciated  MS: extremities- no gross deformities of the visible extremities noted,   EXT:  no lower extremity edema  PSYCH: Alert and oriented times 3; speech- coherent  SKIN:  No obvious significant skin lesions on visible portions of face     Diagnostic Test Results:  Labs reviewed in Epic    ASSESSMENT / PLAN:     (Z00.00) Encounter for Medicare annual wellness exam  (primary encounter diagnosis)  Comment: Discussed cardiac disease risk factors and cardiac disease risk factor modification, including diabetes screening, blood pressure screening (and management if indicated), and cholesterol screening.   Reviewed immunzation guidelines, including pneumococcal vaccines, annual influenza, and shingles vaccines.   Discussed " routine cancer screenings, including skin cancer, colon cancer screening for everyone until age 80, prostate cancer screening in men until age 75, mammogram and PAP/pelvic for women until age 75.   Recommended regular dentist visits to care for remaining teeth.   Recommended regular screening for vision and glaucoma.   Recommended safe driving and accident avoidance.   Plan: REVIEW OF HEALTH MAINTENANCE PROTOCOL ORDERS            (J44.9) Chronic obstructive pulmonary disease, unspecified COPD type (H)  Comment: This condition is currently controlled on the current medical regimen.  Continue current therapy.   Discussed general issues of COPD, including pathophysiology, ways it will affect the pt., when to seek help, reviewed the typical medications (how they are taken, how they help)   Plan: REVIEW OF HEALTH MAINTENANCE PROTOCOL ORDERS,         albuterol (PROAIR HFA/PROVENTIL HFA/VENTOLIN         HFA) 108 (90 Base) MCG/ACT inhaler,         fluticasone-salmeterol (AIRDUO RESPICLICK)         113-14 MCG/ACT inhaler            (E66.01) Severe obesity (BMI 35.0-39.9) with comorbidity (H)  Comment: Obesity contributing to his hypertension and vascular disease.  Current BMI is: Body mass index is 39.13 kg/m ..  Reviewed weight patterns.   Obesity as a biological preventable and treatable disease, which is associated with significantly increased risk of many acute and chronic health conditions.   Obesity has now been recognized as a chronic disease by the American Medical Association.  Obesity has serious co-morbidities associated with obesity including increased risk for hypertension, stroke, coronary artery disease, dyslipidemia, Type II diabetes, depression, sleep apnea, cancers of the colon, breast and endometrium, obstructive sleep apnea, osteoarthritis and female infertility.  Recommended regular aerobic exercise, recommended improved diet aiming at lowering amount of processed foods, lower sugars,  moderation/reduction of simple carbs and fat in the diet, establishing more regular meal times, always eating breakfast, front loading some of the calories and adding more protein to the diet.        Plan: Lipid panel reflex to direct LDL Fasting, CBC         with platelets, Basic metabolic panel, AST, ALT            (I21.4) NSTEMI (non-ST elevated myocardial infarction) (H)  Comment: No new cardiac symptoms.  Discussed secondary risk factor modification and recommended continuing aggressive management of these items.   Plan: REVIEW OF HEALTH MAINTENANCE PROTOCOL ORDERS,         metoprolol succinate ER (TOPROL XL) 50 MG 24 hr        tablet, rosuvastatin (CRESTOR) 40 MG tablet            (E78.5) Hyperlipidemia LDL goal <70  Comment: Discussed guidelines recommending a statin cholesterol lowering medication for any patient with either diabetes and/or vascular disease, aiming for a LDL goal under 100 for sure, ideally under 70, using whatever dose of statin tolerated.    This condition is currently controlled on the current medical regimen.  Continue current therapy.   Plan: REVIEW OF HEALTH MAINTENANCE PROTOCOL ORDERS,         Lipid panel reflex to direct LDL Fasting, AST,         ALT, rosuvastatin (CRESTOR) 40 MG tablet            (I77.819) Aortic ectasia, unspecified site (H)  Comment: Discussed secondary risk factor modification and recommended continuing aggressive management of these items.   Plan: Lipid panel reflex to direct LDL Fasting, CBC         with platelets, Basic metabolic panel, AST, ALT            (I10) Benign essential hypertension  Comment: This condition is currently controlled on the current medical regimen.  Continue current therapy.   Plan: REVIEW OF HEALTH MAINTENANCE PROTOCOL ORDERS,         CBC with platelets, Basic metabolic panel,         amLODIPine (NORVASC) 5 MG tablet, furosemide         (LASIX) 20 MG tablet, lisinopril (ZESTRIL) 40         MG tablet, metoprolol succinate ER (TOPROL XL)          50 MG 24 hr tablet            (Z12.11) Screen for colon cancer  Comment: Colonoscopy scheduled in October at [facility].  Discussed current colon cancer screening recommendations.    Colonoscopy as the gold standard for colon cancer screening as this gives opportunity to identify and remove precancerous polyps.  We can send a referral for this procedure as needed.     If colonoscopy not covered or the patient does not want to do this study and the patient is average risk for colon cancer, then I recommended either the ColoGuard stool test every 3 years or the FIT test annually.  I explained that a positive test for either of these tests does not necessarily mean colon cancer, it just means that they will need a more advanced test like colonoscopy.      Plan: REVIEW OF HEALTH MAINTENANCE PROTOCOL ORDERS,         Colonoscopy Screening  Referral            (R60.0) Bilateral lower extremity edema  Comment: This condition is currently controlled on the current medical regimen.  Continue current therapy.   Plan: REVIEW OF HEALTH MAINTENANCE PROTOCOL ORDERS,         CBC with platelets, Basic metabolic panel,         furosemide (LASIX) 20 MG tablet            (Z13.220) Screening for hyperlipidemia  Comment:   Plan: REVIEW OF HEALTH MAINTENANCE PROTOCOL ORDERS,         Lipid panel reflex to direct LDL Non-fasting            (Z12.5) Screening for prostate cancer  Comment: Discussed prostate cancer screening and PSA blood test.  Discussed that an elevated PSA blood test can be caused by things other than prostate cancer, including infection/inflammation, BPH, and recent sexual activity; and that elevated PSA blood test may require further investigation with a urologist   Plan: PSA, screen               Patient has been advised of split billing requirements and indicates understanding: Yes    COUNSELING:  Reviewed preventive health counseling, as reflected in patient instructions        "Regular exercise       Healthy diet/nutrition       Vision screening       Hearing screening       Dental care       Fall risk prevention       Immunizations    Recommend the annual influenza vaccine and the updated COVID booster shot as soon as he can get them (he plans to get these next week with his wife)           Aspirin prophylaxis        Colon cancer screening       Prostate cancer screening    Estimated body mass index is 39.13 kg/m  as calculated from the following:    Height as of this encounter: 1.753 m (5' 9\").    Weight as of this encounter: 120.2 kg (265 lb).        He reports that he quit smoking about 16 years ago. His smoking use included cigarettes. He has a 15.00 pack-year smoking history. He has never used smokeless tobacco.      Appropriate preventive services were discussed with this patient, including applicable screening as appropriate for cardiovascular disease, diabetes, osteopenia/osteoporosis, and glaucoma.  As appropriate for age/gender, discussed screening for colorectal cancer, prostate cancer, breast cancer, and cervical cancer. Checklist reviewing preventive services available has been given to the patient.    Reviewed patients plan of care and provided an AVS. The  for Giacomo meets the Care Plan requirement. This Care Plan has been established and reviewed with the .    Counseling Resources:  ATP IV Guidelines  Pooled Cohorts Equation Calculator  Breast Cancer Risk Calculator  Breast Cancer: Medication to Reduce Risk  FRAX Risk Assessment  ICSI Preventive Guidelines  Dietary Guidelines for Americans, 2010  [employer] MyPlate  ASA Prophylaxis  Lung CA Screening    Sinan Stephens MD  [facility]    Identified Health Risks:  "

## 2022-09-20 NOTE — PATIENT INSTRUCTIONS
" Proceed with the Colonoscopy as planned.      Stop the aspirin tablets for one week prior to the colonoscopy in case they need to take a biopsy.      Continue all medications at the same doses.  Contact your usual pharmacy if you need refills.      Get the annual influenza vaccine when you can,.      Get the updated Covid booster when you can.  Hopefully this will become just an annual vaccine.      Return to see me in 1 year, sooner if needed.  Use [employer] or Call [phone] to schedule the appointment with me.           5 GOALS TO PREVENT VASCULAR DISEASE:     1.  Aggressive blood pressure control, under 130/80 ideally.  Using medications if needed.    Your blood pressure is under good control    BP Readings from Last 4 Encounters:   09/20/22 132/79   06/09/22 138/70   05/20/22 136/78   09/22/21 135/80       2.  Aggressive LDL cholesterol (\"bad cholesterol\") lowering as indicated.    Your goal is an LDL under 130 for sure, preferably under 100.  (If you have diabetes or previous vascular disease, the the LDL goals would be under 100 for sure, preferably under 70.)    New guidelines identify four high-risk groups who could benefit from statins:   *people with pre-existing heart disease, such as those who have had a heart attack;   *people ages 40 to 75 who have diabetes of any type  *patients ages 40 to 75 with at least a 7.5% risk of developing cardiovascular disease over the next decade, according to a formula described in the guidelines  *patients with the sort of super-high cholesterol that sometimes runs in families, as evidenced by an LDL of 190 milligrams per deciliter or higher    Your cholesterol levels are well controlled.    Recent Labs   Lab Test 08/10/21  1314 08/19/20  0818 02/22/16  0743 06/23/15  0803   CHOL 160 142   < > 184   HDL 46 42   < > 36*   LDL 94 71   < > 89   TRIG 100 143   < > 295*   CHOLHDLRATIO  --   --   --  5.1*    < > = values in this interval not displayed.       3.  Aggressive " diabetic prevention, screening and/or management.      You do not have diabetes as of the most recent blood tests.     4.  No smoking    5.  Consider daily preventative aspirin over age 50 if you have enough cardiac risk factors to place you at higher risk for the presence of vascular disease.    If you have any reason not to take aspirin such easy bruising or bleeding, stomach problems, other anticoagulant medications, or any other side effects, then you should not take Aspirin.     --Based on your current cardiac risk factor profile, you should take regular daily Aspirin 81 mg once per day (as long as you do not have any side effects from taking aspirin).             Preventive Health Recommendations:   Male Ages 65 and over    Yearly exam:             See your health care provider every year in order to  o   Review health changes.   o   Discuss preventive care.    o   Review your medicines if your doctor has prescribed any.  Talk with your health care provider about whether you should have a test to screen for prostate cancer (PSA).  Every 3 years, have a diabetes test (fasting glucose). If you are at risk for diabetes, you should have this test more often.  Every 5 years, have a cholesterol test. Have this test more often if you are at risk for high cholesterol or heart disease.   Every 10 years, have a colonoscopy. Or, have a yearly FIT test (stool test). These exams will check for colon cancer.  Talk to with your health care provider about screening for Abdominal Aortic Aneurysm if you have a family history of AAA or have a history of smoking.    Shots:   Get a flu shot each year.   Get a tetanus shot every 10 years.   Talk to your doctor about your pneumonia vaccines. There are now two you should receive - Pneumovax (PPSV 23) and Prevnar (PCV 13).   Talk to your doctor about a shingles vaccine.   Talk to your doctor about the hepatitis B vaccine.  Nutrition:   Eat at least 5 servings of fruits and vegetables  "each day.   Eat whole-grain bread, whole-wheat pasta and brown rice instead of white grains and rice.   Talk to your provider about Calcium and Vitamin D.      --Good Grains:  Oats, brown rice, Quinoa (these do not raise the blood sugar as much)     --Bad grains:  Anything made from wheat or white rice     (because these raise the blood sugars significantly, and the possible gluten issue from wheat for some people).      --Proteins:  Aim for \"lean proteins\" including chicken, fish, seafood, pork, turkey, and eggs (in moderation); Eat red meat only occasionally    Lifestyle  Exercise for at least 150 minutes a week (30 minutes a day, 5 days a week). This will help you control your weight and prevent disease.   Limit alcohol to one drink per day.   No smoking.   Wear sunscreen to prevent skin cancer.   See your dentist every six months for an exam and cleaning.   See your eye doctor every 1 to 2 years to screen for conditions such as glaucoma, macular degeneration, cataracts, etc         Patient Education   Personalized Prevention Plan  You are due for the preventive services outlined below.  Your care team is available to assist you in scheduling these services.  If you have already completed any of these items, please share that information with your care team to update in your medical record.  Health Maintenance Due   Topic Date Due    ANNUAL REVIEW OF HM ORDERS  Never done    COPD Action Plan  Never done    Zoster (Shingles) Vaccine (1 of 2) Never done    Colorectal Cancer Screening  08/02/2020    COVID-19 Vaccine (4 - Booster for Pfizer series) 11/09/2021    Cholesterol Lab  08/10/2022    Flu Vaccine (1) 09/01/2022     Your Health Risk Assessment indicates you feel you are not in good health    A healthy lifestyle helps keep the body fit and the mind alert. It helps protect you from disease, helps you fight disease, and helps prevent chronic disease (disease that doesn't go away) from getting worse. This is " important as you get older and begin to notice twinges in muscles and joints and a decline in the strength and stamina you once took for granted. A healthy lifestyle includes good healthcare, good nutrition, weight control, recreation, and regular exercise. Avoid harmful substances and do what you can to keep safe. Another part of a healthy lifestyle is stay mentally active and socially involved.    Good healthcare   Have a wellness visit every year.   If you have new symptoms, let us know right away. Don't wait until the next checkup.   Take medicines exactly as prescribed and keep your medicines in a safe place. Tell us if your medicine causes problems.   Healthy diet and weight control   Eat 3 or 4 small, nutritious, low-fat, high-fiber meals a day. Include a variety of fruits, vegetables, and whole-grain foods.   Make sure you get enough calcium in your diet. Calcium, vitamin D, and exercise help prevent osteoporosis (bone thinning).   If you live alone, try eating with others when you can. That way you get a good meal and have company while you eat it.   Try to keep a healthy weight. If you eat more calories than your body uses for energy, it will be stored as fat and you will gain weight.     Recreation   Recreation is not limited to sports and team events. It includes any activity that provides relaxation, interest, enjoyment, and exercise. Recreation provides an outlet for physical, mental, and social energy. It can give a sense of worth and achievement. It can help you stay healthy.    Mental Exercise and Social Involvement  Mental and emotional health is as important as physical health. Keep in touch with friends and family. Stay as active as possible. Continue to learn and challenge yourself.   Things you can do to stay mentally active are:  Learn something new, like a foreign language or musical instrument.   Play SCRABBLE or do crossword puzzles. If you cannot find people to play these games with you at  home, you can play them with others on your computer through the Internet.   Join a games club--anything from card games to chess or checkers or lawn bowling.   Start a new hobby.   Go back to school.   Volunteer.   Read.   Keep up with world events.    Exercise for a Healthier Heart  You may wonder how you can improve the health of your heart. If you re thinking about exercise, you re on the right track. You don t need to become an athlete. But you do need a certain amount of brisk exercise to help strengthen your heart. If you have been diagnosed with a heart condition, your healthcare provider may advise exercise to help stabilize your condition. To help make exercise a habit, choose safe, fun activities.      Exercise with a friend. When activity is fun, you're more likely to stick with it.   Before you start  Check with your healthcare provider before starting an exercise program. This is especially important if you have not been active for a while. It's also important if you have a long-term (chronic) health problem such as heart disease, diabetes, or obesity. Or if you are at high risk for having these problems.   Why exercise?  Exercising regularly offers many healthy rewards. It can help you do all of the following:   Improve your blood cholesterol level to help prevent further heart trouble  Lower your blood pressure to help prevent a stroke or heart attack  Control diabetes, or reduce your risk of getting this disease  Improve your heart and lung function  Reach and stay at a healthy weight  Make your muscles stronger so you can stay active  Prevent falls and fractures by slowing the loss of bone mass (osteoporosis)  Manage stress better  Reduce your blood pressure  Improve your sense of self and your body image  Exercise tips    Ease into your routine. Set small goals. Then build on them. If you are not sure what your activity level should be, talk with your healthcare provider first before starting an  exercise routine.  Exercise on most days. Aim for a total of 150 minutes (2 hours and 30 minutes) or more of moderate-intensity aerobic activity each week. Or 75 minutes (1 hour and 15 minutes) or more of vigorous-intensity aerobic activity each week. Or try for a combination of both. Moderate activity means that you breathe heavier and your heart rate increases but you can still talk. Think about doing 40 minutes of moderate exercise, 3 to 4 times a week. For best results, activity should last for about 40 minutes to lower blood pressure and cholesterol. It's OK to work up to the 40-minute period over time. Examples of moderate-intensity activity are walking 1 mile in 15 minutes. Or doing 30 to 45 minutes of yard work.  Step up your daily activity level.  Along with your exercise program, try being more active the whole day. Walk instead of drive. Or park further away so that you take more steps each day. Do more household tasks or yard work. You may not be able to meet the advised mount of physical activity. But doing some moderate- or vigorous-intensity aerobic activity can help reduce your risk for heart disease. Your healthcare provider can help you figure out what is best for you.  Choose 1 or more activities you enjoy.  Walking is one of the easiest things you can do. You can also try swimming, riding a bike, dancing, or taking an exercise class.    When to call your healthcare provider  Call your healthcare provider if you have any of these:   Chest pain or feel dizzy or lightheaded  Burning, tightness, pressure, or heaviness in your chest, neck, shoulders, back, or arms  Abnormal shortness of breath  More joint or muscle pain  A very fast or irregular heartbeat (palpitations)  [employer] last reviewed this educational content on 7/1/2019 2000-2021 [employer]. All rights reserved. This information is not intended as a substitute for professional medical care. Always follow your healthcare  professional's instructions.          Understanding USDA MyPlate  The USDA has guidelines to help you make healthy food choices. These are called MyPlate. MyPlate shows the food groups that make up healthy meals using the image of a place setting. Before you eat, think about the healthiest choices for what to put on your plate or in your cup or bowl. To learn more about building a healthy plate, visit www.choosemyplate.gov.    The food groups  Fruits. Any fruit or 100% fruit juice counts as part of the Fruit Group. Fruits may be fresh, canned, frozen, or dried, and may be whole, cut-up, or pureed. Make 1/2 of your plate fruits and vegetables.  Vegetables. Any vegetable or 100% vegetable juice counts as a member of the Vegetable Group. Vegetables may be fresh, frozen, canned, or dried. They can be served raw or cooked and may be whole, cut-up, or mashed. Make 1/2 of your plate fruits and vegetables.  Grains. All foods made from grains are part of the Grains Group. These include wheat, rice, oats, cornmeal, and barley. Grains are often used to make foods such as bread, pasta, oatmeal, cereal, tortillas, and grits. Grains should be no more than 1/4 of your plate. At least half of your grains should be whole grains.  Protein. This group includes meat, poultry, seafood, beans and peas, eggs, processed soy products (such as tofu), nuts (including nut butters), and seeds. Make protein choices no more than 1/4 of your plate. Meat and poultry choices should be lean or low fat.  Dairy. The Dairy Group includes all fluid milk products and foods made from milk that contain calcium, such as yogurt and cheese. (Foods that have little calcium, such as cream, butter, and cream cheese, are not part of this group.) Most dairy choices should be low-fat or fat-free.  Oils. Oils aren't a food group, but they do contain essential nutrients. However it's important to watch your intake of oils. These are fats that are liquid at room  temperature. They include canola, corn, olive, soybean, vegetable, and sunflower oil. Foods that are mainly oil include mayonnaise, certain salad dressings, and soft margarines. You likely already get your daily oil allowance from the foods you eat.  Things to limit  Eating healthy also means limiting these things in your diet:     Salt (sodium). Many processed foods have a lot of sodium. To keep sodium intake down, eat fresh vegetables, meats, poultry, and seafood when possible. Purchase low-sodium, reduced-sodium, or no-salt-added food products at the store. And don't add salt to your meals at home. Instead, season them with herbs and spices such as dill, oregano, cumin, and paprika. Or try adding flavor with lemon or lime zest and juice.  Saturated fat. Saturated fats are most often found in animal products such as beef, pork, and chicken. They are often solid at room temperature, such as butter. To reduce your saturated fat intake, choose leaner cuts of meat and poultry. And try healthier cooking methods such as grilling, broiling, roasting, or baking. For a simple lower-fat swap, use plain nonfat yogurt instead of mayonnaise when making potato salad or macaroni salad.  Added sugars. These are sugars added to foods. They are in foods such as ice cream, candy, soda, fruit drinks, sports drinks, energy drinks, cookies, pastries, jams, and syrups. Cut down on added sugars by sharing sweet treats with a family member or friend. You can also choose fruit for dessert, and drink water or other unsweetened beverages.     [employer] last reviewed this educational content on 6/1/2020 2000-2021 [employer]. All rights reserved. This information is not intended as a substitute for professional medical care. Always follow your healthcare professional's instructions.          Signs of Hearing Loss      Hearing much better with one ear can be a sign of hearing loss.   Hearing loss is a problem shared by many  people. In fact, it is one of the most common health problems, particularly as people age. Most people age 65 and older have some hearing loss. By age 80, almost everyone does. Hearing loss often occurs slowly over the years. So you may not realize your hearing has gotten worse.  Have your hearing checked  Call your healthcare provider if you:  Have to strain to hear normal conversation  Have to watch other people s faces very carefully to follow what they re saying  Need to ask people to repeat what they ve said  Often misunderstand what people are saying  Turn the volume of the television or radio up so high that others complain  Feel that people are mumbling when they re talking to you  Find that the effort to hear leaves you feeling tired and irritated  Notice, when using the phone, that you hear better with one ear than the other  [employer] last reviewed this educational content on 1/1/2020 2000-2021 [employer]. All rights reserved. This information is not intended as a substitute for professional medical care. Always follow your healthcare professional's instructions.

## 2022-09-24 DIAGNOSIS — I10 BENIGN ESSENTIAL HYPERTENSION: ICD-10-CM

## 2022-09-26 RX ORDER — LISINOPRIL 40 MG/1
TABLET ORAL
Qty: 90 TABLET | Refills: 3 | Status: SHIPPED | OUTPATIENT
Start: 2022-09-26 | End: 2023-09-29

## 2022-09-26 NOTE — TELEPHONE ENCOUNTER
Prescription approved per [facility] Refill Protocol.  Lisbet Peña RN  [facility] Triage Nurse

## 2022-10-04 ENCOUNTER — TRANSFERRED RECORDS (OUTPATIENT)
Dept: HEALTH INFORMATION MANAGEMENT | Facility: CLINIC | Age: 72
End: 2022-10-04

## 2022-10-16 ENCOUNTER — HEALTH MAINTENANCE LETTER (OUTPATIENT)
Age: 72
End: 2022-10-16

## 2022-10-20 ENCOUNTER — MYC MEDICAL ADVICE (OUTPATIENT)
Dept: INTERNAL MEDICINE | Facility: CLINIC | Age: 72
End: 2022-10-20

## 2023-05-09 ENCOUNTER — TRANSFERRED RECORDS (OUTPATIENT)
Dept: HEALTH INFORMATION MANAGEMENT | Facility: CLINIC | Age: 73
End: 2023-05-09
Payer: MEDICARE

## 2023-06-09 DIAGNOSIS — E78.5 HYPERLIPIDEMIA LDL GOAL <70: ICD-10-CM

## 2023-06-09 DIAGNOSIS — I21.4 NSTEMI (NON-ST ELEVATED MYOCARDIAL INFARCTION) (H): ICD-10-CM

## 2023-06-09 DIAGNOSIS — J44.9 CHRONIC OBSTRUCTIVE PULMONARY DISEASE, UNSPECIFIED COPD TYPE (H): ICD-10-CM

## 2023-06-09 RX ORDER — ROSUVASTATIN CALCIUM 40 MG/1
40 TABLET, COATED ORAL DAILY
Qty: 90 TABLET | Refills: 0 | Status: SHIPPED | OUTPATIENT
Start: 2023-06-09 | End: 2023-09-11

## 2023-06-09 RX ORDER — ALBUTEROL SULFATE 90 UG/1
2 AEROSOL, METERED RESPIRATORY (INHALATION) EVERY 4 HOURS PRN
Qty: 18 G | Refills: 3 | Status: SHIPPED | OUTPATIENT
Start: 2023-06-09

## 2023-06-09 RX ORDER — FLUTICASONE PROPIONATE AND SALMETEROL 113; 14 UG/1; UG/1
1 POWDER, METERED RESPIRATORY (INHALATION) 2 TIMES DAILY
Qty: 1 EACH | Refills: 3 | Status: SHIPPED | OUTPATIENT
Start: 2023-06-09

## 2023-06-09 NOTE — TELEPHONE ENCOUNTER
Patient calling to request refills for rosuvastatin as well as his two inhalers. Patient stated he recently got back from [place] where he had an emergency supply of his medications but needs refills now that he is back in [place].     Script and pharmacy pended. Routing to refill pool for review.

## 2023-06-09 NOTE — TELEPHONE ENCOUNTER
One time refill only; Please notify patient that they are due for appointment and to call to schedule ([phone])  Prescription approved per [facility] Refill Protocol.  Justice L. Phoenix, RN

## 2023-07-13 ENCOUNTER — OFFICE VISIT (OUTPATIENT)
Dept: CARDIOLOGY | Facility: CLINIC | Age: 73
End: 2023-07-13
Payer: MEDICARE

## 2023-07-13 ENCOUNTER — HOSPITAL ENCOUNTER (OUTPATIENT)
Dept: CARDIOLOGY | Facility: CLINIC | Age: 73
Discharge: HOME | End: 2023-07-13
Attending: INTERNAL MEDICINE | Admitting: INTERNAL MEDICINE
Payer: MEDICARE

## 2023-07-13 VITALS
BODY MASS INDEX: 40.4 KG/M2 | SYSTOLIC BLOOD PRESSURE: 112 MMHG | HEART RATE: 76 BPM | OXYGEN SATURATION: 96 % | DIASTOLIC BLOOD PRESSURE: 76 MMHG | HEIGHT: 69 IN | WEIGHT: 272.8 LBS

## 2023-07-13 DIAGNOSIS — Q24.9 ADULT CONGENITAL HEART DISEASE: Primary | ICD-10-CM

## 2023-07-13 DIAGNOSIS — G47.33 OSA (OBSTRUCTIVE SLEEP APNEA): ICD-10-CM

## 2023-07-13 DIAGNOSIS — G47.33 OBSTRUCTIVE SLEEP APNEA ON CPAP: ICD-10-CM

## 2023-07-13 DIAGNOSIS — I35.0 AORTIC VALVE STENOSIS, ETIOLOGY OF CARDIAC VALVE DISEASE UNSPECIFIED: ICD-10-CM

## 2023-07-13 DIAGNOSIS — I10 BENIGN ESSENTIAL HYPERTENSION: ICD-10-CM

## 2023-07-13 DIAGNOSIS — I48.0 PAROXYSMAL ATRIAL FIBRILLATION (H): ICD-10-CM

## 2023-07-13 LAB — LVEF ECHO: NORMAL

## 2023-07-13 PROCEDURE — 93000 ELECTROCARDIOGRAM COMPLETE: CPT | Performed by: INTERNAL MEDICINE

## 2023-07-13 PROCEDURE — 255N000002 HC RX 255 OP 636: Performed by: INTERNAL MEDICINE

## 2023-07-13 PROCEDURE — 93306 TTE W/DOPPLER COMPLETE: CPT | Mod: 26 | Performed by: INTERNAL MEDICINE

## 2023-07-13 PROCEDURE — 99215 OFFICE O/P EST HI 40 MIN: CPT | Mod: 25 | Performed by: INTERNAL MEDICINE

## 2023-07-13 PROCEDURE — 999N000208 ECHOCARDIOGRAM COMPLETE

## 2023-07-13 RX ADMIN — HUMAN ALBUMIN MICROSPHERES AND PERFLUTREN 9 ML: 10; .22 INJECTION, SOLUTION INTRAVENOUS at 09:33

## 2023-07-13 NOTE — PATIENT INSTRUCTIONS
You were seen today in the Adult Congenital and Cardiovascular Genetics Clinic at the [facility].    Cardiology Providers you saw during your visit:  ESTER Du MD    Diagnosis:  BAV, a fib, CAD    Results:  ESTER Du MD reviewed the results of your EKG and echo testing today in clinic.    Recommendations for you:    We have placed a referral for sleep medicine. Please call [phone] to schedule      General Cardiac Recommendations:  Continue to eat a heart healthy, low salt diet.  Continue to get 20-30 minutes of aerobic activity, 4-5 days per week.  Examples of aerobic activity include walking, running, swimming, cycling, etc.  Continue to observe good oral hygiene, with regular dental visits.      SBE prophylaxis:   Yes____  No__X__      Exercise restrictions:   Yes__X__  No____         If yes, list restrictions:  Must be allowed to rest if fatigued or SOB      FASTING CHOLESTEROL was checked in the last 5 years YES__x_  NO___ (2022)      Follow-up:  Follow up with Dr Du in 1 year with an echo prior.     If you have questions or concerns please contact us at:    Sindhu Brito, RN, BSN   Melyssa Trevizo (Scheduling)  Nurse Care Coordinator     Clinic   Adult Congenital and CV Genetics   Adult Congenital and CV Genetic  [facility]   [facility]  (P) [phone]     (P) [phone]            (F) [phone]        For after hours urgent needs, call [phone] and ask to speak to the Adult Congenital Physician on call.  Mention Job Code 0401.    For emergencies call 911.    [facility]  [facility]  Mail Code [id number]   [place]  [address]

## 2023-07-13 NOTE — PROGRESS NOTES
CARDIOLOGY CONSULTATION:    Mr. Solis is a very pleasant, 73-year-old gentleman whom I met in 2016 when he presented with an NSTEMI.  He was found to have moderate aortic valve stenosis with a mean gradient that has been between 18-22 and moderate disease in his coronary arteries with small vessels distally.  He has been managed medically and doing really well.        His weight has been stable with BMI around 40.  His blood pressure is under control.   He has a history of paroxysmal atrial fibrillation, but did have a GI bleed on Xarelto, and so it was recommended he not restart that.  He is in sinus rhythm today. His brother had his aortic valve replaced in 2018; it was likely bicuspid. Unfortunately his brother recently .    On Mr. Solis's valve, it is not clear whether it is bicuspid because of the degree of calcification, but given his brother's disease, this is likely the case.    His ascending aorta has been between 3.8-4.1, which is where it was on his echo today.  His mean gradient across his aortic valve has been creeping up and is currently 27-33 mmHg.   He denies any chest pain, tightness or shortness of breath.  No syncope or pre-syncope.  He golfs for exercise; uses a cart. He goes to [place] for the Winter.  Mr. Solis does have moderate obstructive sleep apnea; he is using the CPAP and feels like the mask he uses is not the right one       PAST MEDICAL HISTORY:  Past Medical History:   Diagnosis Date     Atrial fibrillation (H)     paroxysmal     Basal cell carcinoma      COPD (chronic obstructive pulmonary disease) (H) 2019    FEV1 53 %     Coronary artery disease involving native coronary artery of native heart 16    NSTEMI (peak troponin 0.05); coronary angiogram showed modeerate nonocclusive disease: LM 30-40 %, LAD 30-40%, CX 30-40 %     Diverticulosis of colon 12    sigmoid diverticulosis per CT scan; episode acute diverticulitis per CT scan     Heart valve  insufficiency      Lipoma of other skin and subcutaneous tissue      Lumbar spinal stenosis 9/1/10    lumbar spinal stenosis at L5     NSTEMI (non-ST elevated myocardial infarction) (H) 11/30/16    NSTEMI (peak troponin 0.05); no acute lesions on angiogram     Obesity (BMI 30-39.9) 7/9/13    BMI 38     Obstructive sleep apnea 10/7/14    per sleep study:  AHI 25/hr, desats to 84%. Supine /hr, only 6 minutes spent supine     Other and unspecified hyperlipidemia      Prediabetes 7/9/13    elevated fasting glucoses     Shingles 11/2017    dx when he was in [place]     Tubular adenoma of colon 8/13/13    3 adenomatous poltyps removed, 4 hyperplastic polyps removed     Unspecified essential hypertension        CURRENT MEDICATIONS:  Current Outpatient Medications   Medication Sig Dispense Refill     albuterol (PROAIR HFA/PROVENTIL HFA/VENTOLIN HFA) 108 (90 Base) MCG/ACT inhaler Inhale 2 puffs into the lungs every 4 hours as needed for shortness of breath or wheezing 18 g 3     amLODIPine (NORVASC) 5 MG tablet Take 1 tablet (5 mg) by mouth daily 90 tablet 3     aspirin 81 MG tablet Take 1 tablet by mouth daily.       furosemide (LASIX) 20 MG tablet Take 2 tablets (40 mg) by mouth every morning 180 tablet 3     lisinopril (ZESTRIL) 40 MG tablet TAKE 1 TABLET(40 MG) BY MOUTH DAILY 90 tablet 3     metoprolol succinate ER (TOPROL XL) 50 MG 24 hr tablet Take 1.5 tablets (75 mg) by mouth daily PROFILE FOR FUTURE REFILLS UNTIL PATIENT CALLS FOR REFILLS 135 tablet 3     rosuvastatin (CRESTOR) 40 MG tablet Take 1 tablet (40 mg) by mouth daily 90 tablet 0     fluticasone-salmeterol (AIRDUO RESPICLICK) 113-14 MCG/ACT inhaler Inhale 1 puff into the lungs 2 times daily Use twice daily in the main seasons for breathing problems (Patient not taking: Reported on 7/13/2023) 1 each 3       PAST SURGICAL HISTORY:  Past Surgical History:   Procedure Laterality Date     COLONOSCOPY  8/13/13    3 adenomatous polyps and 4 hyperplastic  polyps removed     HC TOOTH EXTRACTION W/FORCEP      4 wisdom teeth removed withou difficulty       ALLERGIES  No known allergies    FAMILY HX:  Family History   Problem Relation Age of Onset     Hypertension Mother         B:1920     Colon Cancer Mother 84     Kidney Cancer Mother 95         age 95     Heart Disease Father         D: 70's  MI     Hypertension Brother         lipids as well     Heart Disease Brother         CABG x 3, mitral valve reaplcement     Colon Cancer Brother 68     Respiratory Brother         PRACHI     Myocardial Infarction Brother 69        sudden cardiac death     Diabetes No family hx of      Cerebrovascular Disease No family hx of        SOCIAL HX:  Social History     Socioeconomic History     Marital status:      Spouse name: None     Number of children: None     Years of education: None     Highest education level: None   Tobacco Use     Smoking status: Former     Packs/day: 0.75     Years: 20.00     Pack years: 15.00     Types: Cigarettes     Quit date: 2006     Years since quittin.1     Smokeless tobacco: Never     Tobacco comments:     had quit for 1 year, smoked on and off for years, quit again    Substance and Sexual Activity     Alcohol use: Yes     Alcohol/week: 4.0 standard drinks of alcohol     Types: 4 Glasses of wine per week     Comment: 2 glasses of wine daily      Drug use: No     Sexual activity: Not Currently   Other Topics Concern     Special Diet No     Exercise No       ROS:  Constitutional: No fever, chills, or sweats. No weight gain/loss.   ENT: No visual disturbance, ear ache, epistaxis, sore throat.   Allergies/Immunologic: Negative.   Respiratory: No cough, hemoptysis.   Cardiovascular: As per HPI.   GI: No nausea, vomiting, hematemesis, melena, or hematochezia.   : No urinary frequency, dysuria, or hematuria.   Integument: Negative.   Psychiatric: Negative.   Neuro: Negative.   Endocrinology: Negative.   Musculoskeletal: No  "myalgia.    VITAL SIGNS:  /76   Pulse 76   Ht 1.753 m (5' 9\")   Wt 123.7 kg (272 lb 12.8 oz)   SpO2 96%   BMI 40.29 kg/m    Body mass index is 40.29 kg/m .  Wt Readings from Last 2 Encounters:   07/13/23 123.7 kg (272 lb 12.8 oz)   09/20/22 120.2 kg (265 lb)       PHYSICAL EXAM  Giacomo Solis IS A 73 year old male.in no acute distress.  HEENT: Unremarkable.  Lungs: CTA.  Cor: RRR. Normal S1 and S2 mid peaking systolic murmur  Abd: Soft  Extremities: No C/C/E. Neuro: Grossly intact.    LABS    Lab Results   Component Value Date    WBC 5.3 09/20/2022    WBC 5.7 06/27/2021     Lab Results   Component Value Date    RBC 5.59 09/20/2022    RBC 5.63 06/27/2021     Lab Results   Component Value Date    HGB 15.7 09/20/2022    HGB 16.0 06/27/2021     Lab Results   Component Value Date    HCT 47.7 09/20/2022    HCT 48.8 06/27/2021     No components found for: MCT  Lab Results   Component Value Date    MCV 85 09/20/2022    MCV 87 06/27/2021     Lab Results   Component Value Date    MCH 28.1 09/20/2022    MCH 28.4 06/27/2021     Lab Results   Component Value Date    MCHC 32.9 09/20/2022    MCHC 32.8 06/27/2021     Lab Results   Component Value Date    RDW 14.5 09/20/2022    RDW 14.6 06/27/2021     Lab Results   Component Value Date     09/20/2022     06/27/2021      Recent Labs   Lab Test 09/20/22  1353 06/27/21  1456    137   POTASSIUM 3.8 3.8   CHLORIDE 105 104   CO2 26 28   ANIONGAP 8 5   * 89   BUN 16 12   CR 0.81 0.83   LASHAY 9.2 8.7     Recent Labs   Lab Test 09/20/22  1353 08/10/21  1314 02/22/16  0743 06/23/15  0803   CHOL 151 160   < > 184   HDL 39* 46   < > 36*   LDL 81 94   < > 89   TRIG 157* 100   < > 295*   CHOLHDLRATIO  --   --   --  5.1*   NHDL 112 114   < >  --     < > = values in this interval not displayed.      IMPRESSION, REPORT, PLAN:   1.  Moderate coronary artery disease, managed medically with last cath 12/2016, currently asymptomatic.   2. "  Hypertension, reasonably controlled.   3.  History of GI bleed on Xarelto, currently not anticoagulated as a result.   4.  Paroxysmal atrial fibrillation.   5.  Hyperlipidemia, well controlled.   6.  Obesity.   7.  Moderate bicuspid aortic valve stenosis with a mean gradient of 27- 33 mmHg.     8.  Ascending aortic dilatation at 4 cm.   9.  Obstructive sleep apnea using CPAP.      DISCUSSION:  It was a pleasure seeing Mr. Solis in followup.  He is doing well from a cardiovascular standpoint and does not have any concerning symptoms.  We discussed in detail diet and exercise - he is going to start go-lo and is not interested in weight management clinic at this time.  His valve gradient is stable.  Referred to sleep medicine to see if they can help with his mask.    We will see him in a year but he will let us know if he has any concerning symptoms before then.    It was a pleasure to see him today.  Please do not hesitate to contact me with any questions or concerns.      SADI THIBODEAUX MD    40 minutes face-face, documentation and review of records on day of visit

## 2023-07-13 NOTE — LETTER
2023    Sinan Stephens MD  [address] [facility] [address]    RE: Giacomo Solis       Dear Colleague,     I had the pleasure of seeing Giacomo Solis in the [facility].  CARDIOLOGY CONSULTATION:    Mr. Solis is a very pleasant, 73-year-old gentleman whom I met in 2016 when he presented with an NSTEMI.  He was found to have moderate aortic valve stenosis with a mean gradient that has been between 18-22 and moderate disease in his coronary arteries with small vessels distally.  He has been managed medically and doing really well.        His weight has been stable with BMI around 40.  His blood pressure is under control.   He has a history of paroxysmal atrial fibrillation, but did have a GI bleed on Xarelto, and so it was recommended he not restart that.  He is in sinus rhythm today. His brother had his aortic valve replaced in 2018; it was likely bicuspid. Unfortunately his brother recently .    On Mr. Solis's valve, it is not clear whether it is bicuspid because of the degree of calcification, but given his brother's disease, this is likely the case.    His ascending aorta has been between 3.8-4.1, which is where it was on his echo today.  His mean gradient across his aortic valve has been creeping up and is currently 27-33 mmHg.   He denies any chest pain, tightness or shortness of breath.  No syncope or pre-syncope.  He golfs for exercise; uses a cart. He goes to [place] for the Winter.  Mr. Solis does have moderate obstructive sleep apnea; he is using the CPAP and feels like the mask he uses is not the right one       PAST MEDICAL HISTORY:  Past Medical History:   Diagnosis Date    Atrial fibrillation (H)     paroxysmal    Basal cell carcinoma     COPD (chronic obstructive pulmonary disease) (H) 2019    FEV1 53 %    Coronary artery disease involving native coronary artery of native heart 16    NSTEMI (peak troponin 0.05); coronary angiogram  showed modeerate nonocclusive disease: LM 30-40 %, LAD 30-40%, CX 30-40 %    Diverticulosis of colon 4/16/12    sigmoid diverticulosis per CT scan; episode acute diverticulitis per CT scan    Heart valve insufficiency     Lipoma of other skin and subcutaneous tissue     Lumbar spinal stenosis 9/1/10    lumbar spinal stenosis at L5    NSTEMI (non-ST elevated myocardial infarction) (H) 11/30/16    NSTEMI (peak troponin 0.05); no acute lesions on angiogram    Obesity (BMI 30-39.9) 7/9/13    BMI 38    Obstructive sleep apnea 10/7/14    per sleep study:  AHI 25/hr, desats to 84%. Supine /hr, only 6 minutes spent supine    Other and unspecified hyperlipidemia     Prediabetes 7/9/13    elevated fasting glucoses    Shingles 11/2017    dx when he was in [place]    Tubular adenoma of colon 8/13/13    3 adenomatous poltyps removed, 4 hyperplastic polyps removed    Unspecified essential hypertension        CURRENT MEDICATIONS:  Current Outpatient Medications   Medication Sig Dispense Refill    albuterol (PROAIR HFA/PROVENTIL HFA/VENTOLIN HFA) 108 (90 Base) MCG/ACT inhaler Inhale 2 puffs into the lungs every 4 hours as needed for shortness of breath or wheezing 18 g 3    amLODIPine (NORVASC) 5 MG tablet Take 1 tablet (5 mg) by mouth daily 90 tablet 3    aspirin 81 MG tablet Take 1 tablet by mouth daily.      furosemide (LASIX) 20 MG tablet Take 2 tablets (40 mg) by mouth every morning 180 tablet 3    lisinopril (ZESTRIL) 40 MG tablet TAKE 1 TABLET(40 MG) BY MOUTH DAILY 90 tablet 3    metoprolol succinate ER (TOPROL XL) 50 MG 24 hr tablet Take 1.5 tablets (75 mg) by mouth daily PROFILE FOR FUTURE REFILLS UNTIL PATIENT CALLS FOR REFILLS 135 tablet 3    rosuvastatin (CRESTOR) 40 MG tablet Take 1 tablet (40 mg) by mouth daily 90 tablet 0    fluticasone-salmeterol (AIRDUO RESPICLICK) 113-14 MCG/ACT inhaler Inhale 1 puff into the lungs 2 times daily Use twice daily in the main seasons for breathing problems (Patient not taking:  Reported on 2023) 1 each 3       PAST SURGICAL HISTORY:  Past Surgical History:   Procedure Laterality Date    COLONOSCOPY  13    3 adenomatous polyps and 4 hyperplastic polyps removed    HC TOOTH EXTRACTION W/FORCEP      4 wisdom teeth removed withou difficulty       ALLERGIES  No known allergies    FAMILY HX:  Family History   Problem Relation Age of Onset    Hypertension Mother         B:    Colon Cancer Mother 84    Kidney Cancer Mother 95         age 95    Heart Disease Father         D: 70's  MI    Hypertension Brother         lipids as well    Heart Disease Brother         CABG x 3, mitral valve reaplcement    Colon Cancer Brother 68    Respiratory Brother         PRACHI    Myocardial Infarction Brother 69        sudden cardiac death    Diabetes No family hx of     Cerebrovascular Disease No family hx of        SOCIAL HX:  Social History     Socioeconomic History    Marital status:      Spouse name: None    Number of children: None    Years of education: None    Highest education level: None   Tobacco Use    Smoking status: Former     Packs/day: 0.75     Years: 20.00     Pack years: 15.00     Types: Cigarettes     Quit date: 2006     Years since quittin.1    Smokeless tobacco: Never    Tobacco comments:     had quit for 1 year, smoked on and off for years, quit again    Substance and Sexual Activity    Alcohol use: Yes     Alcohol/week: 4.0 standard drinks of alcohol     Types: 4 Glasses of wine per week     Comment: 2 glasses of wine daily     Drug use: No    Sexual activity: Not Currently   Other Topics Concern    Special Diet No    Exercise No       ROS:  Constitutional: No fever, chills, or sweats. No weight gain/loss.   ENT: No visual disturbance, ear ache, epistaxis, sore throat.   Allergies/Immunologic: Negative.   Respiratory: No cough, hemoptysis.   Cardiovascular: As per HPI.   GI: No nausea, vomiting, hematemesis, melena, or hematochezia.   : No urinary  "frequency, dysuria, or hematuria.   Integument: Negative.   Psychiatric: Negative.   Neuro: Negative.   Endocrinology: Negative.   Musculoskeletal: No myalgia.    VITAL SIGNS:  /76   Pulse 76   Ht 1.753 m (5' 9\")   Wt 123.7 kg (272 lb 12.8 oz)   SpO2 96%   BMI 40.29 kg/m    Body mass index is 40.29 kg/m .  Wt Readings from Last 2 Encounters:   07/13/23 123.7 kg (272 lb 12.8 oz)   09/20/22 120.2 kg (265 lb)       PHYSICAL EXAM  Giacomo Solis IS A 73 year old male.in no acute distress.  HEENT: Unremarkable.  Lungs: CTA.  Cor: RRR. Normal S1 and S2 mid peaking systolic murmur  Abd: Soft  Extremities: No C/C/E. Neuro: Grossly intact.    LABS    Lab Results   Component Value Date    WBC 5.3 09/20/2022    WBC 5.7 06/27/2021     Lab Results   Component Value Date    RBC 5.59 09/20/2022    RBC 5.63 06/27/2021     Lab Results   Component Value Date    HGB 15.7 09/20/2022    HGB 16.0 06/27/2021     Lab Results   Component Value Date    HCT 47.7 09/20/2022    HCT 48.8 06/27/2021     No components found for: MCT  Lab Results   Component Value Date    MCV 85 09/20/2022    MCV 87 06/27/2021     Lab Results   Component Value Date    MCH 28.1 09/20/2022    MCH 28.4 06/27/2021     Lab Results   Component Value Date    MCHC 32.9 09/20/2022    MCHC 32.8 06/27/2021     Lab Results   Component Value Date    RDW 14.5 09/20/2022    RDW 14.6 06/27/2021     Lab Results   Component Value Date     09/20/2022     06/27/2021      Recent Labs   Lab Test 09/20/22  1353 06/27/21  1456    137   POTASSIUM 3.8 3.8   CHLORIDE 105 104   CO2 26 28   ANIONGAP 8 5   * 89   BUN 16 12   CR 0.81 0.83   LASHAY 9.2 8.7     Recent Labs   Lab Test 09/20/22  1353 08/10/21  1314 02/22/16  0743 06/23/15  0803   CHOL 151 160   < > 184   HDL 39* 46   < > 36*   LDL 81 94   < > 89   TRIG 157* 100   < > 295*   CHOLHDLRATIO  --   --   --  5.1*   NHDL 112 114   < >  --     < > = values in this interval not displayed.      IMPRESSION, " REPORT, PLAN:   1.  Moderate coronary artery disease, managed medically with last cath 12/2016, currently asymptomatic.   2.  Hypertension, reasonably controlled.   3.  History of GI bleed on Xarelto, currently not anticoagulated as a result.   4.  Paroxysmal atrial fibrillation.   5.  Hyperlipidemia, well controlled.   6.  Obesity.   7.  Moderate bicuspid aortic valve stenosis with a mean gradient of 27- 33 mmHg.     8.  Ascending aortic dilatation at 4 cm.   9.  Obstructive sleep apnea using CPAP.      DISCUSSION:  It was a pleasure seeing Mr. Solis in followup.  He is doing well from a cardiovascular standpoint and does not have any concerning symptoms.  We discussed in detail diet and exercise - he is going to start go-lo and is not interested in weight management clinic at this time.  His valve gradient is stable.  Referred to sleep medicine to see if they can help with his mask.    We will see him in a year but he will let us know if he has any concerning symptoms before then.    It was a pleasure to see him today.  Please do not hesitate to contact me with any questions or concerns.      SADI DU MD    40 minutes face-face, documentation and review of records on day of visit    Thank you for allowing me to participate in the care of your patient.      Sincerely,     Sadi Du MD     [facility]  cc:   Sadi Du MD  [address]  [place] [address]

## 2023-07-24 ENCOUNTER — NURSE TRIAGE (OUTPATIENT)
Dept: INTERNAL MEDICINE | Facility: CLINIC | Age: 73
End: 2023-07-24
Payer: MEDICARE

## 2023-07-24 DIAGNOSIS — Z13.6 CARDIOVASCULAR SCREENING; LDL GOAL LESS THAN 100: ICD-10-CM

## 2023-07-24 DIAGNOSIS — I48.0 PAROXYSMAL ATRIAL FIBRILLATION (H): ICD-10-CM

## 2023-07-24 DIAGNOSIS — E66.01 SEVERE OBESITY (BMI 35.0-39.9) WITH COMORBIDITY (H): ICD-10-CM

## 2023-07-24 DIAGNOSIS — I25.10 CORONARY ARTERY DISEASE INVOLVING NATIVE CORONARY ARTERY OF NATIVE HEART WITHOUT ANGINA PECTORIS: Primary | ICD-10-CM

## 2023-07-24 DIAGNOSIS — E78.2 MIXED HYPERLIPIDEMIA: ICD-10-CM

## 2023-07-24 DIAGNOSIS — J44.9 CHRONIC OBSTRUCTIVE PULMONARY DISEASE, UNSPECIFIED COPD TYPE (H): ICD-10-CM

## 2023-07-24 DIAGNOSIS — R73.03 PREDIABETES: ICD-10-CM

## 2023-07-24 DIAGNOSIS — R53.83 FATIGUE, UNSPECIFIED TYPE: ICD-10-CM

## 2023-07-24 NOTE — TELEPHONE ENCOUNTER
"I do not know what is causing his symptoms without examining him.   Doubtful that diabetes is causing his troubles with the glucose levels that are reported .  (symptoms from hyperglycemia typically start when above 250+).    My availability is limited this week, Return to see me next week in an appointment in the office. Get fasting labs done a couple days before to evlauate in the office.     OK to use any open same day, next day, or virtual appointment spot to schedule this office visit.   Schedule the \"lab only\" appointment for fasting labs within a week before the appointment if possible, otherwise we can do labs at the time of the appointment.    Future orders placed.       If the patient declines to schedule a follow up appointment, please make a notation, and let me know.    Close encounter when done.     "

## 2023-07-24 NOTE — TELEPHONE ENCOUNTER
Patient's wife calling on behalf of patient.   Wife reports patient has been feeling fatigued for past couple months and has noticed eyes are 'blurry, almost like a double vision' for 1-1.5 mins after waking up before adjusting to clear vision. Wife is concerned with patient's fatigue, stating he will nap 3-4 hours in recliner then go to bed for the night.     Patient's last A1C checked 7/2020. Wife has periodically checked pt's BG in AM and PM since noticing fatigue.   Patient is currently asymptomatic.   - Last week (Wife unsure of day/date) fasting AM  and HS .  - 7/23/23 fasting AM , HS .      Hemoglobin A1C   Date Value Ref Range Status   07/31/2020 6.1 (H) 0 - 5.6 % Final     Comment:     Normal <5.7% Prediabetes 5.7-6.4%  Diabetes 6.5% or higher - adopted from ADA   consensus guidelines.     08/08/2019 6.3 (H) 0 - 5.6 % Final     Comment:     Normal <5.7% Prediabetes 5.7-6.4%  Diabetes 6.5% or higher - adopted from ADA   consensus guidelines.     09/18/2018 6.0 (H) 0 - 5.6 % Final     Comment:     Normal <5.7% Prediabetes 5.7-6.4%  Diabetes 6.5% or higher - adopted from ADA   consensus guidelines.       Dispo: Home Care  Wife requesting PCP recommendations and lab orders to check patient's A1C.         Can we leave a detailed message on this number? YES  Phone number patient can be reached at: Cell number on file:    Telephone Information:   Mobile [phone]       Lori Nicholson RN  [facility] Triage    Reason for Disposition   Blood glucose  mg/dL (3.9 -13.3 mmol/L)    Additional Information   Negative: Unconscious or difficult to awaken   Negative: Acting confused (e.g., disoriented, slurred speech)   Negative: Very weak (can't stand)   Negative: Sounds like a life-threatening emergency to the triager   Negative: Vomiting and signs of dehydration (e.g., very dry mouth, lightheaded, dark urine)   Negative: Blood glucose > 240 mg/dL (13.3 mmol/L) and rapid  "breathing   Negative: Blood glucose > 500 mg/dL (27.8 mmol/L)   Negative: Blood glucose > 240 mg/dL (13.3 mmol/L) AND urine ketones moderate-large (or more than 1+)   Negative: Blood glucose > 240 mg/dL (13.3 mmol/L) and blood ketones > 1.4 mmol/L   Negative: Blood glucose > 240 mg/dL (13.3 mmol/L) AND vomiting AND unable to check for ketones (in blood or urine)   Negative: Vomiting lasting > 4 hours   Negative: Patient sounds very sick or weak to the triager   Negative: Fever > 100.4 F (38.0 C)   Negative: Caller has URGENT medication or insulin pump question and triager unable to answer question   Negative: Blood glucose > 400 mg/dL (22.2 mmol/L)   Negative: Blood glucose > 300 mg/dL (16.7 mmol/L) AND two or more times in a row   Negative: Urine ketones moderate - large (or blood ketones > 1.4 mmol/L)   Negative: New-onset diabetes mellitus suspected (e.g., frequent urination, weak, weight loss)   Negative: Symptoms of high blood sugar (e.g., frequent urination, weak, weight loss) and not able to test blood glucose   Negative: Patient wants to be seen   Negative: Caller has NON-URGENT medication question about med that PCP prescribed and triager unable to answer question   Negative: Blood glucose > 300 mg/dL (16.7 mmol/L)   Negative: Blood glucose 240 - 300 mg/dL (13.3 - 16.7 mmol/L)    Answer Assessment - Initial Assessment Questions  1. BLOOD GLUCOSE: \"What is your blood glucose level?\"       7/23/23 fasting , HS   2. ONSET: \"When did you check the blood glucose?\"      Meter/strips  3. USUAL RANGE: \"What is your glucose level usually?\" (e.g., usual fasting morning value, usual evening value)      Patient does not check BG  4. KETONES: \"Do you check for ketones (urine or blood test strips)?\" If yes, ask: \"What does the test show now?\"       No  5. TYPE 1 or 2:  \"Do you know what type of diabetes you have?\"  (e.g., Type 1, Type 2, Gestational; doesn't know)       Patient is not diagnosed with Diabetes. " "  6. INSULIN: \"Do you take insulin?\" \"What type of insulin(s) do you use? What is the mode of delivery? (syringe, pen; injection or pump)?\"       No  7. DIABETES PILLS: \"Do you take any pills for your diabetes?\" If yes, ask: \"Have you missed taking any pills recently?\"      No  8. OTHER SYMPTOMS: \"Do you have any symptoms?\" (e.g., fever, frequent urination, difficulty breathing, dizziness, weakness, vomiting)      No  9. PREGNANCY: \"Is there any chance you are pregnant?\" \"When was your last menstrual period?\"      No    Protocols used: Diabetes - High Blood Sugar-A-OH    "

## 2023-07-25 ENCOUNTER — LAB (OUTPATIENT)
Dept: LAB | Facility: CLINIC | Age: 73
End: 2023-07-25
Payer: MEDICARE

## 2023-07-25 DIAGNOSIS — R73.03 PREDIABETES: ICD-10-CM

## 2023-07-25 DIAGNOSIS — I48.0 PAROXYSMAL ATRIAL FIBRILLATION (H): ICD-10-CM

## 2023-07-25 DIAGNOSIS — E78.2 MIXED HYPERLIPIDEMIA: ICD-10-CM

## 2023-07-25 DIAGNOSIS — J44.9 CHRONIC OBSTRUCTIVE PULMONARY DISEASE, UNSPECIFIED COPD TYPE (H): ICD-10-CM

## 2023-07-25 DIAGNOSIS — I25.10 CORONARY ARTERY DISEASE INVOLVING NATIVE CORONARY ARTERY OF NATIVE HEART WITHOUT ANGINA PECTORIS: ICD-10-CM

## 2023-07-25 DIAGNOSIS — Z13.6 CARDIOVASCULAR SCREENING; LDL GOAL LESS THAN 100: ICD-10-CM

## 2023-07-25 DIAGNOSIS — E66.01 SEVERE OBESITY (BMI 35.0-39.9) WITH COMORBIDITY (H): ICD-10-CM

## 2023-07-25 DIAGNOSIS — R53.83 FATIGUE, UNSPECIFIED TYPE: ICD-10-CM

## 2023-07-25 LAB
ALBUMIN SERPL BCG-MCNC: 4.3 G/DL (ref 3.5–5.2)
ALP SERPL-CCNC: 75 U/L (ref 40–129)
ALT SERPL W P-5'-P-CCNC: 14 U/L (ref 0–70)
ANION GAP SERPL CALCULATED.3IONS-SCNC: 10 MMOL/L (ref 7–15)
AST SERPL W P-5'-P-CCNC: 22 U/L (ref 0–45)
BILIRUB SERPL-MCNC: 0.9 MG/DL
BUN SERPL-MCNC: 13.5 MG/DL (ref 8–23)
CALCIUM SERPL-MCNC: 8.8 MG/DL (ref 8.8–10.2)
CHLORIDE SERPL-SCNC: 104 MMOL/L (ref 98–107)
CHOLEST SERPL-MCNC: 142 MG/DL
CREAT SERPL-MCNC: 0.89 MG/DL (ref 0.67–1.17)
DEPRECATED HCO3 PLAS-SCNC: 26 MMOL/L (ref 22–29)
ERYTHROCYTE [DISTWIDTH] IN BLOOD BY AUTOMATED COUNT: 13.8 % (ref 10–15)
GFR SERPL CREATININE-BSD FRML MDRD: >90 ML/MIN/1.73M2
GLUCOSE SERPL-MCNC: 119 MG/DL (ref 70–99)
HBA1C MFR BLD: 6.5 % (ref 0–5.6)
HCT VFR BLD AUTO: 49 % (ref 40–53)
HDLC SERPL-MCNC: 36 MG/DL
HGB BLD-MCNC: 16 G/DL (ref 13.3–17.7)
LDLC SERPL CALC-MCNC: 80 MG/DL
MCH RBC QN AUTO: 28.1 PG (ref 26.5–33)
MCHC RBC AUTO-ENTMCNC: 32.7 G/DL (ref 31.5–36.5)
MCV RBC AUTO: 86 FL (ref 78–100)
NONHDLC SERPL-MCNC: 106 MG/DL
PLATELET # BLD AUTO: 203 10E3/UL (ref 150–450)
POTASSIUM SERPL-SCNC: 4.1 MMOL/L (ref 3.4–5.3)
PROT SERPL-MCNC: 7.2 G/DL (ref 6.4–8.3)
RBC # BLD AUTO: 5.7 10E6/UL (ref 4.4–5.9)
SODIUM SERPL-SCNC: 140 MMOL/L (ref 136–145)
TRIGL SERPL-MCNC: 130 MG/DL
TSH SERPL DL<=0.005 MIU/L-ACNC: 1.53 UIU/ML (ref 0.3–4.2)
WBC # BLD AUTO: 5.7 10E3/UL (ref 4–11)

## 2023-07-25 PROCEDURE — 84443 ASSAY THYROID STIM HORMONE: CPT

## 2023-07-25 PROCEDURE — 80053 COMPREHEN METABOLIC PANEL: CPT

## 2023-07-25 PROCEDURE — 85027 COMPLETE CBC AUTOMATED: CPT

## 2023-07-25 PROCEDURE — 83036 HEMOGLOBIN GLYCOSYLATED A1C: CPT

## 2023-07-25 PROCEDURE — 36415 COLL VENOUS BLD VENIPUNCTURE: CPT

## 2023-07-25 PROCEDURE — 80061 LIPID PANEL: CPT

## 2023-07-25 NOTE — TELEPHONE ENCOUNTER
Triage RN attempted to contact the patient to relay message from the provider below and assist in getting the patient scheduled for an appointment with PCP and lab appointment.     Patient Contact    Attempt # 1    Was call answered?  No.  Left message on voicemail with information to call me back.    Upon call back: relay message from provider and assist in getting patient scheduled for an office visit and lab appointment.    Claudia Milan RN

## 2023-07-25 NOTE — TELEPHONE ENCOUNTER
Patient and patient's wife returning call. Writer relayed PCP's message below, patient expressed verbal understanding.    Patient reports he has not eaten or drank anything today, requesting fasting lab appt today. Writer scheduled lab and OV appts:    [facility]    8/1/2023 11:00 AM (Arrive by 10:40 AM)Sinan Stephens MDM [facility]    Martha Hernandez RN  -[facility]

## 2023-07-31 ENCOUNTER — MYC MEDICAL ADVICE (OUTPATIENT)
Dept: INTERNAL MEDICINE | Facility: CLINIC | Age: 73
End: 2023-07-31
Payer: MEDICARE

## 2023-08-01 ENCOUNTER — OFFICE VISIT (OUTPATIENT)
Dept: INTERNAL MEDICINE | Facility: CLINIC | Age: 73
End: 2023-08-01
Payer: MEDICARE

## 2023-08-01 VITALS
OXYGEN SATURATION: 95 % | TEMPERATURE: 98.2 F | BODY MASS INDEX: 40.12 KG/M2 | WEIGHT: 270.9 LBS | HEIGHT: 69 IN | DIASTOLIC BLOOD PRESSURE: 84 MMHG | HEART RATE: 87 BPM | SYSTOLIC BLOOD PRESSURE: 126 MMHG

## 2023-08-01 DIAGNOSIS — M25.561 CHRONIC PAIN OF RIGHT KNEE: ICD-10-CM

## 2023-08-01 DIAGNOSIS — M22.2X1 PATELLOFEMORAL SYNDROME, RIGHT: Primary | ICD-10-CM

## 2023-08-01 DIAGNOSIS — E66.01 SEVERE OBESITY (BMI 35.0-39.9) WITH COMORBIDITY (H): ICD-10-CM

## 2023-08-01 DIAGNOSIS — G47.33 OBSTRUCTIVE SLEEP APNEA ON CPAP: ICD-10-CM

## 2023-08-01 DIAGNOSIS — G89.29 CHRONIC PAIN OF RIGHT KNEE: ICD-10-CM

## 2023-08-01 DIAGNOSIS — E11.59 TYPE 2 DIABETES MELLITUS WITH OTHER CIRCULATORY COMPLICATION, WITHOUT LONG-TERM CURRENT USE OF INSULIN (H): ICD-10-CM

## 2023-08-01 DIAGNOSIS — R53.82 CHRONIC FATIGUE: ICD-10-CM

## 2023-08-01 PROBLEM — E11.9 DIABETES MELLITUS, TYPE 2 (H): Status: ACTIVE | Noted: 2023-08-01

## 2023-08-01 PROCEDURE — 99215 OFFICE O/P EST HI 40 MIN: CPT | Performed by: INTERNAL MEDICINE

## 2023-08-01 NOTE — PROGRESS NOTES
Assessment & Plan     (M22.2X1) Patellofemoral syndrome, right  (primary encounter diagnosis)  Comment: Based on the exam of the mood, suspect this is more patellofemoral syndrome, possibly could be a mild slight meniscus tear.  Patient would like to start with conservative measures with exercise, stretching and strengthening, and a knee sleeve.  If he has no improvement, then needs referral to sports medicine for further evaluation.  The x-ray machine was down today at our clinic, I offered to have the exam performed in the clinic but he wished to defer x-rays to a later date.  Told patient an MRI will be needed if we are to investigate a meniscus injury.  Plan: Orthopedic  Referral            (M25.561,  G89.29) Chronic pain of right knee  Comment: As above  Plan:             (E11.59) Type 2 diabetes mellitus with other circulatory complication, without long-term current use of insulin (H)  Comment: Patient's had a longstanding history of prediabetes and has just barely crossing the type 2 diabetes threshold with a hemoglobin A1c above 6.5.  While this does not represent a significant change over recent years this still is important to manage aggressively by what ever means necessary.  He feels that he can make significant progress in his diet reducing his carbohydrate intake.  His wife is a type II diabetic for many years and understands a low carbohydrate diet concept well.  Discussed the possibility of medications such as metformin (which would be cheap) versus Trulicity/Ozempic which would help not only diabetes but help with weight loss, and help reduce cardiac events.  We can see if this will be covered by his insurance and if so started.  The patient preferred to wait on this at this time.  We will follow-up on his progress with repeat labs in a few months.  Discussed importance of aggressive management of diabetes, including aggressive low cabr diet (one of the most powerful ways to control type  "II DM), performing regular glucose monitoring, (either with standard glucometer, or preferably personal continuous glucose monitor), medication compliance, regular laboratory monitoring at least every 6 months (or possibly more often if diabetes not at goal), and attending regular follow up appointments as ordered.    Aggressive diabetes management of diabetes will greatly reduce the future risk of diabetes related complications such as CAD, CVA, PVD, and retinopathy/neuropathy/nephropathy.    Plan:     (E66.01) Severe obesity (BMI 35.0-39.9) with comorbidity (H)  Comment: His obesity contributes to his diabetes/prediabetes.  Recommend low carbohydrate diet, low saturated fat diet and more exercise if tolerated.  Plan:     (R53.82) Chronic fatigue  Comment: Numerous reasons for his fatigue most likely is obstructive sleep apnea.  His recent labs show no obvious reasons for fatigue.  Return to see sleep clinic as previously ordered.  Plan:       (G47.33,  Z99.89) Obstructive sleep apnea on CPAP  Comment: Return to sleep clinic for reevaluation of the management of his sleep apnea.  Plan:             BMI:   Estimated body mass index is 40 kg/m  as calculated from the following:    Height as of this encounter: 1.753 m (5' 9\").    Weight as of this encounter: 122.9 kg (270 lb 14.4 oz).     Spent greater than 55 minutes on patient's care today between direct contact with the patient, chart review, charting, and greater than half the time was education and counseling between the patient and his wife,       Sinan Stephens MD  [facility]    Anh Shields is a 73 year old, presenting for the following health issues:  Hyperglycemia        8/1/2023    10:53 AM   Additional Questions   Roomed by Ania WALL CMA       History of Present Illness       Reason for visit:  High glucose  Symptom onset:  3-7 days ago    He eats 0-1 servings of fruits and vegetables daily.He consumes 0 " "sweetened beverage(s) daily.He exercises with enough effort to increase his heart rate 9 or less minutes per day.  He exercises with enough effort to increase his heart rate 3 or less days per week.   He is taking medications regularly.       History of prediabetes with previous A1c's between 5.9 and 6.4.  Recent glucose levels at home checked by his wife's glucometer showed slightly elevated readings in the 140s fasting.  Recent hemoglobin A1c returned 6.5.  He has been counseled extensively in the past about low carbohydrate diet and the importance of diet and exercise as it relates to diabetic management he feels that he is slipped a little bit in his diet eating more carbohydrates than he knows he should.    Reviewed labs with patient and his wife    Lab Results   Component Value Date    A1C 6.5 07/25/2023    A1C 6.1 07/31/2020    A1C 6.3 08/08/2019    A1C 6.0 09/18/2018    A1C 6.1 09/06/2017    A1C 6.4 10/21/2016    A1C 6.4 02/22/2016    A1C 6.4 06/23/2015    A1C 6.2 01/23/2015    A1C 6.1 10/30/2014    A1C 6.2 07/25/2014    A1C 5.9 03/15/2013    A1C 6.1 01/10/2012    A1C 6.1 10/28/2010    A1C 5.9 07/14/2009    A1C 5.7 08/31/2007          2.   History of aortic valve stenosis, followed closely by cardiology.  The gradients been worsening however has not at the point that needs replacement.      3.  Patient reports ongoing fatigue especially during the daytime.  He does have a history of obstructive sleep apnea, using CPAP via nasal pillows \"as best he can\" on most nights.  He is not sure if it is effective or not.  Was recommended for further follow-up with the sleep clinic by cardiology.  He has been contacted make an appointment but has not yet done so.      4.   The patient has had a history of ongoing obesity.  Reviewed the weigth curves.   Their current BMI is:  Body mass index is 40 kg/m .  Reviewed previous attempts at weight loss which have not been successful in producing prolonged weigth loss. " "  Discussed current eating and exercise habits.     Reviewed weights in chart:  Wt Readings from Last 10 Encounters:   08/01/23 122.9 kg (270 lb 14.4 oz)   07/13/23 123.7 kg (272 lb 12.8 oz)   09/20/22 120.2 kg (265 lb)   06/09/22 121.7 kg (268 lb 6.4 oz)   05/20/22 121.6 kg (268 lb)   09/22/21 120.7 kg (266 lb)   08/10/21 122.5 kg (270 lb)   07/30/21 122.5 kg (270 lb)   06/27/21 122.5 kg (270 lb)   07/31/20 120.7 kg (266 lb)        5.   Reports ongoing generalized chronic fatigue and overall decreased energy.  Without specific chest pain.      6.  Ongoing right anterior knee pain.  He feels that the pain in his anterior knee does limit his walking ability, specifically notices it when climbing down stairs.  The knee does not give out, there is no specific swelling.  There is no been no recent injuries.      **I reviewed the information recorded in the patient's EPIC chart (including but not limited to medical history, surgical history, family history, problem list, medication list, and allergy list) and updated the information as indicated based on the patients reported information.       Review of Systems   Constitutional, HEENT, cardiovascular, pulmonary, gi and gu systems are negative, except as otherwise noted.      Objective    /84   Pulse 87   Temp 98.2  F (36.8  C) (Oral)   Ht 1.753 m (5' 9\")   Wt 122.9 kg (270 lb 14.4 oz)   SpO2 95%   BMI 40.00 kg/m    Body mass index is 40 kg/m .  Physical Exam   GENERAL alert and no distress  EYES:  Normal sclera,conjunctiva, EOMI  HENT: oral and posterior pharynx without lesions or erythema, facies symmetric  NECK: Neck supple. No LAD, without thyroidmegaly.  RESP: Clear to ausculation bilaterally without wheezes or crackles. Normal BS in all fields.  CV: Irregular rhythm consistent with atrial fibrillation, regular rate, normal S1S2 with 2/6 systolic murmmur without murmurs, rubs or gallops.  LYMPH: no cervical lymph adenopathy appreciated  MS: " extremities-no swelling in the right knee joint.  There is no warmth.  Right knee has normal range of motion without pain.  Ruddy's test slightly positive for mild pain.  No joint effusion.  Minimal crepitus underneath the patella, patellar apprehension test negative.  EXT:  no lower extremity edema  PSYCH: Alert and oriented times 3; speech- coherent  SKIN:  No obvious significant skin lesions on visible portions of face

## 2023-08-01 NOTE — PATIENT INSTRUCTIONS
" Referral back to sleep clinic to review your obstructive sleep apnea management.  Please contact Sleep CLinic Dept @: [phone].    *  Your knee pain from \"patellofemoral syndrome\" which is inflammation of the underside of the knee cap (patella_) as it rides against the lower portion of the thigh bone (femur) causing increased \"wear and tear\" of the underside of the knee cap.  This is not specifically a joint or ligament problem. I.e. You are not going to need knee surgery.    *  Consider knee braces/sleeves (Neoprene knee sleeves) every day when you are going to be active.  This helps control the movement of the kneecaps and reduce the inflammation.  You can get these at most pharmacies, on line, and most sports stores.           *  The main treatment for this also includes exercises for the muscles of your lower leg and stretching of the hamstrings.     *  Google exercises and stretches for this issue:  \"patellofemoral syndrome rehabilitation exercises\"    *  Referral to physical therapy through [place] [facility] if desired.     *  This condition will wax and wane depending on your activity levels and types of activities.      *  Be sure to make good footwear choices, avoid flip flops and sandals, and any free-heeled shoes.  Choose shoes that have good arch support and provide solid footing    *  If you do not improve despite these measures, then contact us for re-evaluation.              5 GOALS IN MANAGING DIABETES (i.e. to give the best chance to prevent diabetic complications and vascular disease):     1.  Aggressive diabetic management.  The target for A1C (3 months average blood sugar) is ideally below 6.5, preferably below 7.5    Your diabetes is under good control.      Lab Results   Component Value Date    A1C 6.5 07/25/2023    A1C 6.1 07/31/2020    A1C 6.3 08/08/2019    A1C 6.0 09/18/2018    A1C 6.1 09/06/2017    A1C 6.4 10/21/2016    A1C 6.4 02/22/2016    A1C 6.4 06/23/2015    A1C 6.2 " "01/23/2015    A1C 6.1 10/30/2014    A1C 6.2 07/25/2014    A1C 5.9 03/15/2013    A1C 6.1 01/10/2012    A1C 6.1 10/28/2010    A1C 5.9 07/14/2009    A1C 5.7 08/31/2007       2.  Aggressive blood pressure control, under 130/80 ideally.  Using medications if needed.    Your blood pressure is under good control    BP Readings from Last 4 Encounters:   08/01/23 (!) 128/90   07/13/23 112/76   09/20/22 132/79   06/09/22 138/70       3.  Aggressive LDL cholesterol (bad cholesterol) lowering as needed.  Your goal is an LDL under 100 for sure, preferably under 70.     *  All patients with diabetes are recommended to be on a \"statin\" cholesterol lowering medication regardless of the cholesterol levels to give the best chance at avoiding vascular disease.      New guidelines identify four high-risk groups who could benefit from statins:   *people with pre-existing heart disease, such as those who have had a heart attack;   *people ages 40 to 75 who have diabetes of any type  *patients ages 40 to 75 with at least a 7.5% risk of developing cardiovascular disease over the next decade, according to a formula described in the guidelines  *patients with the sort of super-high cholesterol that sometimes runs in families, as evidenced by an LDL of 190 milligrams per deciliter or higher    *  Your cholesterol levels are well controlled.    Recent Labs   Lab Test 07/25/23  1103 09/20/22  1353 02/22/16  0743 06/23/15  0803   CHOL 142 151   < > 184   HDL 36* 39*   < > 36*   LDL 80 81   < > 89   TRIG 130 157*   < > 295*   CHOLHDLRATIO  --   --   --  5.1*    < > = values in this interval not displayed.       4.  No smoking    5.  Consider daily preventative Aspirin once per day, every day over age 50 unless there is a specific reason that you cannot take aspirin.       *You should take Aspirin 81 mg once per day, unless you have a reason NOT to take aspirin (i.e. side effect, excessive bruising or bleeding, taking another \"blood tinning\" " medication, etc.)       DIABETES REMINDERS:   1. Check your blood glucose regularly either with standard glucometer or personal continuous glucose monitor.    2) Your blood sugar goals:  before eating and  two hours after eating.  If using personal continuous glucose monitor, the goal is Time in the Target range ( ) of 70-75% with very few (under 2%) Below target.    3) Always be prepared to treat low blood sugar symptoms should it happen. Keep a sugar-containing beverage or food nearby.  4) When to call your clinic:     Blood sugar over 400     If you have 2 to 3 low blood sugars (under 70) in a row    Low readings the same time of day several days in a row  5) When to call 911: If your low blood glucose symptoms do not get better with treatment, or if you/someone else is unable to give you treatment.  6) Work with a Certified Diabetes Educator to assist you with your diabetes management  7) Contact us if you have ANY questions about your medications or instructions, or have problems with getting prescriptions filled.

## 2023-08-02 NOTE — TELEPHONE ENCOUNTER
Saw him in the office today.   He was just letting us know that he took Lagevrio for covid in Dec 2022.   He has not experienced any significant side effects of this medication.  No sequelae from covid.

## 2023-08-06 ASSESSMENT — SLEEP AND FATIGUE QUESTIONNAIRES
HOW LIKELY ARE YOU TO NOD OFF OR FALL ASLEEP WHILE SITTING AND TALKING TO SOMEONE: WOULD NEVER DOZE
HOW LIKELY ARE YOU TO NOD OFF OR FALL ASLEEP WHILE WATCHING TV: MODERATE CHANCE OF DOZING
HOW LIKELY ARE YOU TO NOD OFF OR FALL ASLEEP IN A CAR, WHILE STOPPED FOR A FEW MINUTES IN TRAFFIC: SLIGHT CHANCE OF DOZING
HOW LIKELY ARE YOU TO NOD OFF OR FALL ASLEEP WHILE SITTING QUIETLY AFTER LUNCH WITHOUT ALCOHOL: SLIGHT CHANCE OF DOZING
HOW LIKELY ARE YOU TO NOD OFF OR FALL ASLEEP WHILE SITTING INACTIVE IN A PUBLIC PLACE: SLIGHT CHANCE OF DOZING
HOW LIKELY ARE YOU TO NOD OFF OR FALL ASLEEP WHEN YOU ARE A PASSENGER IN A CAR FOR AN HOUR WITHOUT A BREAK: SLIGHT CHANCE OF DOZING
HOW LIKELY ARE YOU TO NOD OFF OR FALL ASLEEP WHILE LYING DOWN TO REST IN THE AFTERNOON WHEN CIRCUMSTANCES PERMIT: SLIGHT CHANCE OF DOZING
HOW LIKELY ARE YOU TO NOD OFF OR FALL ASLEEP WHILE SITTING AND READING: MODERATE CHANCE OF DOZING

## 2023-08-07 ENCOUNTER — OFFICE VISIT (OUTPATIENT)
Dept: SLEEP MEDICINE | Facility: CLINIC | Age: 73
End: 2023-08-07
Payer: MEDICARE

## 2023-08-07 VITALS
HEART RATE: 95 BPM | DIASTOLIC BLOOD PRESSURE: 69 MMHG | WEIGHT: 273 LBS | SYSTOLIC BLOOD PRESSURE: 108 MMHG | BODY MASS INDEX: 40.43 KG/M2 | OXYGEN SATURATION: 96 % | HEIGHT: 69 IN

## 2023-08-07 DIAGNOSIS — G47.33 OSA (OBSTRUCTIVE SLEEP APNEA): ICD-10-CM

## 2023-08-07 DIAGNOSIS — Q24.9 ADULT CONGENITAL HEART DISEASE: ICD-10-CM

## 2023-08-07 DIAGNOSIS — I10 BENIGN ESSENTIAL HYPERTENSION: ICD-10-CM

## 2023-08-07 DIAGNOSIS — I35.0 AORTIC VALVE STENOSIS, ETIOLOGY OF CARDIAC VALVE DISEASE UNSPECIFIED: ICD-10-CM

## 2023-08-07 DIAGNOSIS — I48.0 PAROXYSMAL ATRIAL FIBRILLATION (H): ICD-10-CM

## 2023-08-07 DIAGNOSIS — G47.33 OBSTRUCTIVE SLEEP APNEA ON CPAP: ICD-10-CM

## 2023-08-07 PROCEDURE — 99203 OFFICE O/P NEW LOW 30 MIN: CPT | Performed by: INTERNAL MEDICINE

## 2023-08-07 NOTE — NURSING NOTE
"Chief Complaint   Patient presents with    Sleep Problem     Establish care, need pressure change       Initial /69   Pulse 95   Ht 1.753 m (5' 9.02\")   Wt 123.8 kg (273 lb)   SpO2 96%   BMI 40.30 kg/m   Estimated body mass index is 40.3 kg/m  as calculated from the following:    Height as of this encounter: 1.753 m (5' 9.02\").    Weight as of this encounter: 123.8 kg (273 lb).    Medication Reconciliation: complete  ESS 9  Neck circumference:  48 centimeters.  Joanne Terry MA      "

## 2023-08-07 NOTE — PROGRESS NOTES
Sleep Consultation:    Date on this visit: 8/7/2023    Giacomo oSlis  is referred by Duc Du for a sleep consultation.     Primary Physician: Sinan Stephens     Giacomo Solis presents to clinic for management of sleep apnea.     Patient's split-night PSG on 10/7/2014 at a weight of 268 pounds showed an apnea-hypopnea index of 25.4/h.  There was limited supine sleep and the diagnostic portion.  Supine AHI was 110/h and nonsupine AHI was 21.9/h.  Lowest oxygen saturation was 84%.  CPAP at a pressure of 8 cm H2O was effective for treatment of sleep apnea.    He is currently on auto titrating CPAP therapy with a pressure range of 5 to 18 cm H2O.    His medical history significant for hypertension, coronary artery disease, paroxysmal atrial fibrillation, hyperlipidemia, aortic dilation.     Overall, he rates the experience with PAP as ok. The mask is not comfortable. The mask is not leaking.  He is not snoring with the mask on. He is not having gasp arousals.  He is not having significant oral/nasal dryness. The pressure settings are not comfortable.     He uses nasal mask.     He does feel rested in the morning.    [employer]     Auto-PAP 5-18 cmH2O download:  87/90 total days of use. 3 nonuse days. 90% days with >4 hours use.  Average use 6 hours 23 minutes per day. Median Leak 7.3 L/min. 95%ile Leak 25.8 L/min. CPAP 95% pressure 10.6 cm. AHI 0.5    Giacomo goes to sleep at 1:00 AM during the week. He wakes up at 8:30 AM. He falls asleep in 5 minutes.  Giacomo denies difficulty falling asleep.  He wakes up 0-1 times a night for 5 minutes before falling back to sleep.  Giacomo wakes up to go to the bathroom.  Patient gets an average of 7 hours of sleep per night.     Giacomo denies any sleep walking, sleep talking, dream enactment, sleep paralysis, and hypnogogic/hypnopompic hallucinations.    Patient's Parsons Sleepiness score 9/24 consistent with no daytime sleepiness.      Giacomo naps 0 times  per week. He takes some inadvertant naps.  He denies closing eyes, dozing, and falling asleep while driving. Patient was counseled on the importance of driving while alert, to pull over if drowsy, or nap before getting into the vehicle if sleepy.      He uses no caffeine.    He drinks 3 alcohol drinks per night.     Problem List:  Patient Active Problem List    Diagnosis Date Noted    Diabetes mellitus, type 2 (H) 08/01/2023     Priority: Medium    COPD (chronic obstructive pulmonary disease) (H) 08/06/2019     Priority: Medium     FEV1 53 %      Nonrheumatic aortic valve stenosis 06/26/2018     Priority: Medium    Ascending aorta dilatation (H) 06/26/2018     Priority: Medium    NSTEMI (non-ST elevated myocardial infarction) (H) 11/30/2016     Priority: Medium    Coronary artery disease involving native coronary artery of native heart without angina pectoris 11/30/2016     Priority: Medium     NSTEMI (peak troponin 0.05); coronary angiogram showed modeerate nonocclusive disease: LM 30-40 %, LAD 30-40%, CX 30-40 %      Gastroesophageal reflux disease without esophagitis 06/26/2015     Priority: Medium    PRACHI (obstructive sleep apnea) 10/28/2014     Priority: Medium    Obstructive sleep apnea on CPAP 10/07/2014     Priority: Medium     per sleep study:  AHI 25/hr, desats to 84%. Supine /hr, only 6 minutes spent supine      Paroxysmal atrial fibrillation (H) 07/01/2014     Priority: Medium    Tubular adenoma of colon 08/13/2013     Priority: Medium     3 adenomatous poltyps removed, 4 hyperplastic polyps removed      Severe obesity (BMI 35.0-35.9 with comorbidity) (H) 07/09/2013     Priority: Medium     BMI 38      Prediabetes 07/09/2013     Priority: Medium     elevated fasting glucoses      Diverticulosis of colon 04/16/2012     Priority: Medium     sigmoid diverticulosis per CT scan; episode acute diverticulitis per CT scan      Advanced directives, counseling/discussion 07/19/2011     Priority: Medium      "Advance Directive Problem List Overview:   Name Relationship Phone    Primary Health Care Agent            Alternative Health Care Agent          Discussed advance care planning with patient; information given to patient to review. 2011           Mixed hyperlipidemia 10/31/2010     Priority: Medium    Lumbar spinal stenosis 2010     Priority: Medium     lumbar spinal stenosis at L5      Severe obesity (BMI 35.0-39.9) with comorbidity (H) 2007     Priority: Medium    Allergic rhinitis 2007     Priority: Medium     Problem list name updated by automated process. Provider to review      Benign essential hypertension 2003     Priority: Medium     Problem list name updated by automated process. Provider to review        Social History     Tobacco Use    Smoking status: Former     Packs/day: 0.75     Years: 20.00     Pack years: 15.00     Types: Cigarettes     Quit date: 2006     Years since quittin.2    Smokeless tobacco: Never    Tobacco comments:     had quit for 1 year, smoked on and off for years, quit again    Substance Use Topics    Alcohol use: Yes     Alcohol/week: 4.0 standard drinks of alcohol     Types: 4 Glasses of wine per week     Comment: 2 glasses of wine daily     Drug use: No       Physical Examination:  Vitals: /69   Pulse 95   Ht 1.753 m (5' 9.02\")   Wt 123.8 kg (273 lb)   SpO2 96%   BMI 40.30 kg/m    BMI= Body mass index is 40.3 kg/m .  GENERAL APPEARANCE: healthy, alert, and no distress  HENT: oropharynx crowded  NEURO: mentation intact and speech normal  PSYCH: mentation appears normal and affect normal/bright  Mallampati Class: IV.  Tonsillar Stage: 1  hidden by pillars.    Impression/Plan:    Obstructive sleep apnea    Patient is on auto PAP therapy 5-18 cm H2O for treatment of moderate obstructive sleep apnea, first diagnosed in . Baseline apnea hypopnea index was 25.4 per hour(Supine AHI was 110/h and nonsupine AHI was 21.9/h). "     Patient is using CPAP regularly and benefits from therapy.  Although he did not experience the dramatic benefit that he was hoping for, he continues to use CPAP regularly.  I reviewed download data and graphs from his device for the last 30 days.  Regular compliance and normal residual AHI is demonstrated, confirming adequate treatment of sleep apnea.    He complains of having pressure intolerance and I reduced auto CPAP range to 5 to 13 cm H2O.    Plan:     Continue auto CPAP therapy with pressure settings changed to 5 to 13 cm H2O.    Obstructive sleep apnea reviewed.  Complications of untreated sleep apnea were reviewed.  CPAP download reviewed.     I spent a total of 40 minutes for this appointment on this date of service which include time spent before, during and after the visit for chart review, patient care, counseling and coordination of care.    Dr. Vaughn Taylor       CC: Duc Du

## 2023-08-21 ENCOUNTER — PATIENT OUTREACH (OUTPATIENT)
Dept: CARE COORDINATION | Facility: CLINIC | Age: 73
End: 2023-08-21
Payer: MEDICARE

## 2023-08-24 ENCOUNTER — TRANSFERRED RECORDS (OUTPATIENT)
Dept: HEALTH INFORMATION MANAGEMENT | Facility: CLINIC | Age: 73
End: 2023-08-24
Payer: MEDICARE

## 2023-08-28 ENCOUNTER — TRANSFERRED RECORDS (OUTPATIENT)
Dept: HEALTH INFORMATION MANAGEMENT | Facility: CLINIC | Age: 73
End: 2023-08-28
Payer: MEDICARE

## 2023-08-30 ENCOUNTER — TELEPHONE (OUTPATIENT)
Dept: ORTHOPEDICS | Facility: CLINIC | Age: 73
End: 2023-08-30
Payer: MEDICARE

## 2023-08-30 NOTE — TELEPHONE ENCOUNTER
Left voicemail for patient to return call to discuss changing time of appointment on 9/13/23.     Molly Pena MSA, ATC  Certified Athletic Trainer

## 2023-08-31 NOTE — TELEPHONE ENCOUNTER
Spoke with patient and adjusted appointment time. Address was confirmed. No further action needed.     Molly Pena MSA, ATC  Certified Athletic Trainer

## 2023-09-04 ENCOUNTER — PATIENT OUTREACH (OUTPATIENT)
Dept: CARE COORDINATION | Facility: CLINIC | Age: 73
End: 2023-09-04
Payer: MEDICARE

## 2023-09-09 DIAGNOSIS — E78.5 HYPERLIPIDEMIA LDL GOAL <70: ICD-10-CM

## 2023-09-09 DIAGNOSIS — I21.4 NSTEMI (NON-ST ELEVATED MYOCARDIAL INFARCTION) (H): ICD-10-CM

## 2023-09-11 RX ORDER — ROSUVASTATIN CALCIUM 40 MG/1
40 TABLET, COATED ORAL DAILY
Qty: 90 TABLET | Refills: 1 | Status: SHIPPED | OUTPATIENT
Start: 2023-09-11 | End: 2023-09-29

## 2023-09-13 ENCOUNTER — OFFICE VISIT (OUTPATIENT)
Dept: ORTHOPEDICS | Facility: CLINIC | Age: 73
End: 2023-09-13
Attending: INTERNAL MEDICINE
Payer: MEDICARE

## 2023-09-13 ENCOUNTER — ANCILLARY PROCEDURE (OUTPATIENT)
Dept: GENERAL RADIOLOGY | Facility: CLINIC | Age: 73
End: 2023-09-13
Attending: FAMILY MEDICINE
Payer: MEDICARE

## 2023-09-13 VITALS
SYSTOLIC BLOOD PRESSURE: 120 MMHG | WEIGHT: 272 LBS | DIASTOLIC BLOOD PRESSURE: 83 MMHG | HEIGHT: 69 IN | BODY MASS INDEX: 40.29 KG/M2

## 2023-09-13 DIAGNOSIS — M17.11 PRIMARY OSTEOARTHRITIS OF RIGHT KNEE: Primary | ICD-10-CM

## 2023-09-13 DIAGNOSIS — M25.561 CHRONIC PAIN OF RIGHT KNEE: ICD-10-CM

## 2023-09-13 DIAGNOSIS — M22.2X1 PATELLOFEMORAL SYNDROME, RIGHT: ICD-10-CM

## 2023-09-13 DIAGNOSIS — G89.29 CHRONIC PAIN OF RIGHT KNEE: ICD-10-CM

## 2023-09-13 PROCEDURE — 99203 OFFICE O/P NEW LOW 30 MIN: CPT | Performed by: FAMILY MEDICINE

## 2023-09-13 PROCEDURE — 73562 X-RAY EXAM OF KNEE 3: CPT | Mod: TC | Performed by: RADIOLOGY

## 2023-09-13 RX ORDER — MOLNUPIRAVIR 200 MG/1
CAPSULE ORAL
COMMUNITY
Start: 2022-12-01 | End: 2022-12-15

## 2023-09-13 RX ORDER — FLUOROURACIL 50 MG/G
CREAM TOPICAL
COMMUNITY
Start: 2023-08-28 | End: 2024-07-18

## 2023-09-13 NOTE — PROGRESS NOTES
CHIEF COMPLAINT:  No chief complaint on file.       HISTORY OF PRESENT ILLNESS  Mr. Solis is a pleasant 73 year old year old male who presents to clinic today with right knee pain.  Giacomo explains that he injured this knee back in 2016 when he was bowling. His shoes stuck to the floor and he fell forward 4 different times. The pain had gone away and recently flared back up about 2 months ago.    Onset: sudden  Location: right knee  Quality:  sharp, dull, throbbing  Duration: 2 months  Severity: 10/10 at worst  Timing:intermittent episodes with increased activity and twisting  Modifying factors:  resting and non-use makes it better, movement, twisting, bending, and use makes it worse  Associated signs & symptoms: pain, weakness, swelling, instability  Previous similar pain: Yes, injury in 2016  Treatments to date: Tylenol    Additional history: as documented    Review of Systems:  Have you recently had a a fever, chills, weight loss? No  Do you have any vision problems? No  Do you have any chest pain or edema? No  Do you have any shortness of breath or wheezing?  No  Do you have stomach problems? No  Do you have any numbness or focal weakness? No  Do you have diabetes? No  Do you have problems with bleeding or clotting? No  Do you have an rashes or other skin lesions? No      MEDICAL HISTORY  Patient Active Problem List   Diagnosis    Benign essential hypertension    Severe obesity (BMI 35.0-39.9) with comorbidity (H)    Allergic rhinitis    Lumbar spinal stenosis    Mixed hyperlipidemia    Advanced directives, counseling/discussion    Diverticulosis of colon    Severe obesity (BMI 35.0-35.9 with comorbidity) (H)    Prediabetes    Tubular adenoma of colon    Paroxysmal atrial fibrillation (H)    PRACHI (obstructive sleep apnea)    Obstructive sleep apnea on CPAP    Gastroesophageal reflux disease without esophagitis    NSTEMI (non-ST elevated myocardial infarction) (H)    Coronary artery disease involving native  coronary artery of native heart without angina pectoris    Nonrheumatic aortic valve stenosis    Ascending aorta dilatation (H)    COPD (chronic obstructive pulmonary disease) (H)    Diabetes mellitus, type 2 (H)       Current Outpatient Medications   Medication Sig Dispense Refill    albuterol (PROAIR HFA/PROVENTIL HFA/VENTOLIN HFA) 108 (90 Base) MCG/ACT inhaler Inhale 2 puffs into the lungs every 4 hours as needed for shortness of breath or wheezing 18 g 3    amLODIPine (NORVASC) 5 MG tablet Take 1 tablet (5 mg) by mouth daily 90 tablet 3    aspirin 81 MG tablet Take 1 tablet by mouth daily.      fluticasone-salmeterol (AIRDUO RESPICLICK) 113-14 MCG/ACT inhaler Inhale 1 puff into the lungs 2 times daily Use twice daily in the main seasons for breathing problems (Patient not taking: Reported on 2023) 1 each 3    furosemide (LASIX) 20 MG tablet Take 2 tablets (40 mg) by mouth every morning 180 tablet 3    lisinopril (ZESTRIL) 40 MG tablet TAKE 1 TABLET(40 MG) BY MOUTH DAILY 90 tablet 3    metoprolol succinate ER (TOPROL XL) 50 MG 24 hr tablet Take 1.5 tablets (75 mg) by mouth daily PROFILE FOR FUTURE REFILLS UNTIL PATIENT CALLS FOR REFILLS 135 tablet 3    rosuvastatin (CRESTOR) 40 MG tablet TAKE 1 TABLET(40 MG) BY MOUTH DAILY 90 tablet 1       Allergies   Allergen Reactions    No Known Allergies        Family History   Problem Relation Age of Onset    Hypertension Mother         B:192    Colon Cancer Mother 84    Kidney Cancer Mother 95         age 95    Heart Disease Father         D: 70's  MI    Hypertension Brother         lipids as well    Heart Disease Brother         CABG x 3, mitral valve reaplcement    Colon Cancer Brother 68    Respiratory Brother         PRACHI    Myocardial Infarction Brother 69        sudden cardiac death    Diabetes No family hx of     Cerebrovascular Disease No family hx of        Additional medical/Social/Surgical histories reviewed in [facility] and updated as appropriate.        PHYSICAL EXAM  There were no vitals taken for this visit.    General  - normal appearance, in no obvious distress  Musculoskeletal - Right knee  - stance: normal gait without limp  - inspection: trace effusion  - palpation: medial joint line tenderness  - ROM: 120 degrees flexion, -5 degrees extension, pain at terminal extension passively  - strength: 5/5 in flexion, 5/5 in extension  - special tests:  (-) Lachman  (-) anterior drawer  (-) Ruddy  (-) varus at 0 and 30 degrees flexion  (-) valgus at 0 and 30 degrees flexion  Neuro  - no sensory or motor deficit, grossly normal coordination, normal muscle tone      IMAGING : XR knee right 3 views today. Final results and radiologist's interpretation, available in the [facility] health record. Images were reviewed with the patient/family members in the office today. My personal interpretation of the performed imaging is moderate medial compartment joint space narrowing as compared to 2016.  Consistent with primary arthritis.    XR KNEE RT 3 VW 11/13/2016 10:13 AM     HISTORY: Pain.     COMPARISON: None.                                                     IMPRESSION: No evidence of acute fracture or malalignment.     EVA ORELLANA MD     ASSESSMENT & PLAN  Mr. Solis is a 73 year old year old male who presents to clinic today with right knee pain intermittently over the past 8 years, worse in the past two months.    Clinical examination and radiographs both concerning for medial compartment osteoarthritis.    Diagnosis: Primary arthritis of right knee, chronic pain right knee    Discussed pathophysiology, treatment options for osteoarthritis of right knee which is developed since 2016.  I recommend he start with a home exercise program and we did review indications for physical therapy.  He will prefer to start with HEP.  Also discussed consideration of low-impact exercise, injections, maintaining appropriate body mass index, OTC analgesics and Voltaren gel.  I did  recommend he try Voltaren gel given his other issues in which NSAIDs would not be appropriate.  Injections were reviewed and at this time given improvement noted in his knee, he will hold off at this juncture.    At this time I am happy to remain on standby for any time that he does need me and no specific interval follow-up is necessary.    It was a pleasure seeing Giacomo today.    Tyler Lobo DO, CAQSM  Primary Care Sports Medicine

## 2023-09-13 NOTE — LETTER
9/13/2023         RE: Giacomo Solis  [address] [place] [id number]        Dear Colleague,    Thank you for referring your patient, Giacomo Solis, to the [facility] [place]. Please see a copy of my visit note below.    CHIEF COMPLAINT:  No chief complaint on file.       HISTORY OF PRESENT ILLNESS  Mr. Solis is a pleasant 73 year old year old male who presents to clinic today with right knee pain.  Giacomo explains that he injured this knee back in 2016 when he was bowling. His shoes stuck to the floor and he fell forward 4 different times. The pain had gone away and recently flared back up about 2 months ago.    Onset: sudden  Location: right knee  Quality:  sharp, dull, throbbing  Duration: 2 months  Severity: 10/10 at worst  Timing:intermittent episodes with increased activity and twisting  Modifying factors:  resting and non-use makes it better, movement, twisting, bending, and use makes it worse  Associated signs & symptoms: pain, weakness, swelling, instability  Previous similar pain: Yes, injury in 2016  Treatments to date: Tylenol    Additional history: as documented    Review of Systems:  Have you recently had a a fever, chills, weight loss? No  Do you have any vision problems? No  Do you have any chest pain or edema? No  Do you have any shortness of breath or wheezing?  No  Do you have stomach problems? No  Do you have any numbness or focal weakness? No  Do you have diabetes? No  Do you have problems with bleeding or clotting? No  Do you have an rashes or other skin lesions? No      MEDICAL HISTORY  Patient Active Problem List   Diagnosis     Benign essential hypertension     Severe obesity (BMI 35.0-39.9) with comorbidity (H)     Allergic rhinitis     Lumbar spinal stenosis     Mixed hyperlipidemia     Advanced directives, counseling/discussion     Diverticulosis of colon     Severe obesity (BMI 35.0-35.9 with comorbidity) (H)     Prediabetes     Tubular  adenoma of colon     Paroxysmal atrial fibrillation (H)     PRACHI (obstructive sleep apnea)     Obstructive sleep apnea on CPAP     Gastroesophageal reflux disease without esophagitis     NSTEMI (non-ST elevated myocardial infarction) (H)     Coronary artery disease involving native coronary artery of native heart without angina pectoris     Nonrheumatic aortic valve stenosis     Ascending aorta dilatation (H)     COPD (chronic obstructive pulmonary disease) (H)     Diabetes mellitus, type 2 (H)       Current Outpatient Medications   Medication Sig Dispense Refill     albuterol (PROAIR HFA/PROVENTIL HFA/VENTOLIN HFA) 108 (90 Base) MCG/ACT inhaler Inhale 2 puffs into the lungs every 4 hours as needed for shortness of breath or wheezing 18 g 3     amLODIPine (NORVASC) 5 MG tablet Take 1 tablet (5 mg) by mouth daily 90 tablet 3     aspirin 81 MG tablet Take 1 tablet by mouth daily.       fluticasone-salmeterol (AIRDUO RESPICLICK) 113-14 MCG/ACT inhaler Inhale 1 puff into the lungs 2 times daily Use twice daily in the main seasons for breathing problems (Patient not taking: Reported on 2023) 1 each 3     furosemide (LASIX) 20 MG tablet Take 2 tablets (40 mg) by mouth every morning 180 tablet 3     lisinopril (ZESTRIL) 40 MG tablet TAKE 1 TABLET(40 MG) BY MOUTH DAILY 90 tablet 3     metoprolol succinate ER (TOPROL XL) 50 MG 24 hr tablet Take 1.5 tablets (75 mg) by mouth daily PROFILE FOR FUTURE REFILLS UNTIL PATIENT CALLS FOR REFILLS 135 tablet 3     rosuvastatin (CRESTOR) 40 MG tablet TAKE 1 TABLET(40 MG) BY MOUTH DAILY 90 tablet 1       Allergies   Allergen Reactions     No Known Allergies        Family History   Problem Relation Age of Onset     Hypertension Mother         B:192     Colon Cancer Mother 84     Kidney Cancer Mother 95         age 95     Heart Disease Father         D: 70's  MI     Hypertension Brother         lipids as well     Heart Disease Brother         CABG x 3, mitral valve reaplcement      Colon Cancer Brother 68     Respiratory Brother         PRACHI     Myocardial Infarction Brother 69        sudden cardiac death     Diabetes No family hx of      Cerebrovascular Disease No family hx of        Additional medical/Social/Surgical histories reviewed in [facility] and updated as appropriate.       PHYSICAL EXAM  There were no vitals taken for this visit.    General  - normal appearance, in no obvious distress  Musculoskeletal - Right knee  - stance: normal gait without limp  - inspection: trace effusion  - palpation: medial joint line tenderness  - ROM: 120 degrees flexion, -5 degrees extension, pain at terminal extension passively  - strength: 5/5 in flexion, 5/5 in extension  - special tests:  (-) Lachman  (-) anterior drawer  (-) Ruddy  (-) varus at 0 and 30 degrees flexion  (-) valgus at 0 and 30 degrees flexion  Neuro  - no sensory or motor deficit, grossly normal coordination, normal muscle tone      IMAGING : XR knee right 3 views today. Final results and radiologist's interpretation, available in the [facility] health record. Images were reviewed with the patient/family members in the office today. My personal interpretation of the performed imaging is moderate medial compartment joint space narrowing as compared to 2016.  Consistent with primary arthritis.    XR KNEE RT 3 VW 11/13/2016 10:13 AM     HISTORY: Pain.     COMPARISON: None.                                                     IMPRESSION: No evidence of acute fracture or malalignment.     EVA ORELLANA MD     ASSESSMENT & PLAN  Mr. Solis is a 73 year old year old male who presents to clinic today with right knee pain intermittently over the past 8 years, worse in the past two months.    Clinical examination and radiographs both concerning for medial compartment osteoarthritis.    Diagnosis: Primary arthritis of right knee, chronic pain right knee    Discussed pathophysiology, treatment options for osteoarthritis of right knee which is developed  since 2016.  I recommend he start with a home exercise program and we did review indications for physical therapy.  He will prefer to start with HEP.  Also discussed consideration of low-impact exercise, injections, maintaining appropriate body mass index, OTC analgesics and Voltaren gel.  I did recommend he try Voltaren gel given his other issues in which NSAIDs would not be appropriate.  Injections were reviewed and at this time given improvement noted in his knee, he will hold off at this juncture.    At this time I am happy to remain on standby for any time that he does need me and no specific interval follow-up is necessary.    It was a pleasure seeing Giacomo today.    Tyler Lobo DO, [facility]  Primary Care Sports Medicine       Again, thank you for allowing me to participate in the care of your patient.        Sincerely,        Tyler Lobo DO

## 2023-09-13 NOTE — PATIENT INSTRUCTIONS
Thank you for choosing [facility]    [facility]  [place] [address] [address], [address]   [place], [place], [address] [place] [address]   [phone]       APPOINTMENTS: [phone]    CARE QUESTIONS: [phone],    BILLING QUESTIONS: [phone]    FAX NUMBER: [phone]        Follow up: As needed if symptoms do not improve      1. Patellofemoral syndrome, right            KNEE OSTEOARTHRITIS    WHAT IS KNEE OSTEOARTHRITIS?    Osteoarthritis is a disease in which the cartilage in your joints breaks down. Cartilage is tissue that lines and cushions the surface of joints. It covers the ends of bones and allows free movement of joints. If joint cartilage gets rough or wears away, the roughened cartilage or bone surfaces grind against each other. The joint gets irritated and swollen (inflamed). Sometimes the irritation causes abnormal growths of bone, called spurs.    Osteoarthritis most often affects joints in the feet, knees, hips, lower back, or fingers. It usually affects only one or a few joints at a time. It is a lifelong problem that can get worse over time. However, you can relieve symptoms and prevent or slow down joint damage by following your healthcare provider s treatment plan and taking care of yourself.    WHAT IS THE CAUSE?    The exact cause of osteoarthritis is not known. Things that may cause or contribute to osteoarthritis are:    Aging. Osteoarthritis slowly gets worse as you get older. Symptoms are usually not noticed until middle age.  Too much wear on joints. Obesity, bad posture, and overuse can cause extra wear on joints.  Injuries to joints from sports or accidents  Pressure. Heavy lifting or contact sports can put pressure on joints and damage the cartilage.  Genes you have inherited. Genes are inside each cell of your body and are passed from parents to children. They contain the  information that tells your body how to develop and work. A family history of osteoarthritis can double your risk of having osteoarthritis.    WHAT ARE THE SYMPTOMS?    Symptoms may include:    Mild to severe pain in a joint, which may be worse after long periods of bending, lifting or not moving  Creaking or grating sound in the joint  Swelling, stiffness, or limited movement of the joint, especially in the morning  Weakness in muscles around the sore joint from lack of use  Changes in the shape of the joint  The pain of osteoarthritis starts with activity and continues after you stop the activity. The stiffness of osteoarthritis wears off quickly (usually within 15 to 30 minutes) once you get moving in the morning.    HOW IS IT DIAGNOSED?    Your healthcare provider will ask about your symptoms and medical history and examine you. If there is a question about what type of arthritis you have or how severe your osteoarthritis is, other tests may include:    Blood tests  Joint aspiration, which uses a needle to take fluid from a joint for testing  X-rays  You may have other tests or scans to check for other possible causes of your symptoms.    HOW IS IT TREATED?    There is no cure for osteoarthritis, but treatment can help:    Relieve pain and stiffness  Reduce swelling  Keep the joints working properly  Stop or slow down damage to the joints  There are many ways to treat osteoarthritis.    Medicine    Nonprescription pain medicine, such as acetaminophen or nonsteroidal anti-inflammatory drugs (NSAIDs), may help relieve pain. NSAIDs are available in pills, creams, and patches.    Acetaminophen may cause liver damage or other problems. Don t take more than 4000 milligrams (mg) in 24 hours. To make sure you don t take too much, check other medicines you take to see if they also contain acetaminophen. Unless recommended by your healthcare provider, you should not take this medicine for more than 10 days. Ask your  provider if you need to avoid drinking alcohol while taking this medicine.    NSAIDs, such as ibuprofen, naproxen, ketoprofen, and aspirin, may cause stomach bleeding and other problems. These risks increase with age. Read the label and take as directed. Unless recommended by your healthcare provider, you should not take this medicine for more than 10 days.    Steroid medicine may be prescribed to decrease pain and swelling. It can be given as a pill, cream or ointment, or shot. Using a steroid for a long time can have serious side effects. Take steroid medicine exactly as your healthcare provider prescribes. Don t take more or less of it than prescribed by your provider and don t take it longer than prescribed. Don t stop taking a steroid without your provider's approval. You may have to lower your dosage slowly before stopping it.  Hyaluronic acid may be injected into your knee if you have arthritis in your knee. It helps your knee move more easily.    Joint Health Supplements -Glucosamine and Chondroitin. Omega 3    Exercise  Three types of exercise may help:    Range-of-motion exercises are gentle stretching exercises that help you move each joint as far as possible. Examples include low-speed bike riding, alex chi, and yoga. Range-of-motion exercises help you keep or improve your flexibility and relieve stiffness.  Strengthening exercise, such as weight training, makes muscles and tendons stronger. Strong muscles and tendons support joints better. You will be able to move more easily and with less pain.    Aerobic or endurance exercise at a moderate pace, such as walking or bicycle riding, improves your overall health and helps control your weight. Exercise in a warm swimming pool is a very helpful option. The water supports your weight while you move, and the warmth helps improve joint movement.    Talk with your healthcare provider before you start an exercise program. Too much exercise too soon or even at the  wrong time of day may make arthritis worse. Your provider may refer you to a physical therapist to design a program that is right for you.    Surgery    Your provider may advise arthroscopy, which is a type of surgery done with a small scope inserted into your joint. Your provider can look directly at your joint and sometimes make helpful repairs, such as removing bone spurs, without having to cut open the joint.    If you have a joint that is severely damaged, surgery may be done to replace your joint with an artificial joint.    Other treatments    Your healthcare provider may recommend physical or occupational therapy to treat pain and help you have better use of your joints.  Although the evidence is not conclusive, some people seem to benefit from the natural remedies glucosamine and chondroitin sulfate. Acupuncture may also help reduce pain and stiffness in the joints.  Transcutaneous electrical nerve stimulation (TENS) may relieve some types of arthritis pain. TENS directs mild electric pulses through the skin to nerves in the painful area.  Sometimes it may help to use a splint or brace to rest the joint and protect it from injury.  HOW CAN I TAKE CARE OF MYSELF?    Follow the full course of treatment prescribed by your healthcare provider.  Rest your joints when they are hot, swollen, or painful.    Learn how to move in ways that are easier on your joints. Be open to using devices to help you. Helpful devices may include canes and walkers; bath seats and grab bars for the bathtub; and larger  on tools, eating utensils, pens, and pencils. Velcro fasteners on clothes and shoes can be very useful, too.    Rubbing deep-heat creams on a painful joint can give short-term relief. Putting an ice pack on the joint once or twice a day can also help relieve pain. See which works best for you. Some people get relief by alternating heat and cold packs. Hot paraffin baths can help symptoms in the hands and  feet.    Eat a healthy diet. Ask your provider about the benefits of talking to a dietician to learn what you need in a healthy diet.    Try to keep a healthy weight. If you are overweight, lose weight. Losing some weight can reduce the stress on your joints.    If you smoke, try to quit. Talk to your healthcare provider about ways to quit smoking.    Try to get at least 7 to 9 hours of sleep each night.  Join a support group or take classes on how to manage your arthritis.    Ask your healthcare provider:  How and when you will hear your test results  What activities you should avoid and when you can return to normal activities  What symptoms or problems you should watch for and what to do if you have them  Make sure you know when you should come back for a checkup.    Consider knee injections: Cortisone injection vs. Gel Injection (lubricant, cushion) Synvisc One      Developed by [employer].  Published by [employer].  Copyright  2014 [employer] and/or one of its subsidiaries. All rights reserved.    KEY FACTS ABOUT MENISCAL TEARS  Click to enlarge A meniscal tear can be caused by a sudden activity that twists or tears a ligament, such as a forced twisting motion of your knee, or a fall or impact during football, basketball, or soccer.    Straightening your knee further than it can normally be straightened (hyperextension) or trouble bending, like when you land from a jump, is also another cause of a meniscal tear.  A loud, painful pop at the time of the injury is very common. It causes an immediate swelling and pain around the knee as if loose or unstable.    WHAT IS MENISCAL TEAR?  A meniscal tear is tear to the meniscal ligament that keeps the knee from bending inward. The meniscal, along with other ligaments, keeps your knee and leg bones in place when you walk or run. When a ligament is injured, it can be stretched, partially torn, or completely torn. Complete tears make the knee joint very loose  and unstable.    A ligament injury is also called a sprain.    HOW IS MENISCAL TEAR DIAGNOSED AND TREATED?  A physician will examine the knee, ligaments, and tissue to make an initial assessment. Typically, he or she will recommend a combination of X-rays, CT scans, or MRIs to determine the exact extent of the damage.     You will need to change or stop doing the activities that cause pain until the ligament has healed.    If you have swelling in your joint, your healthcare team may need to remove fluid from your knee with a needle and syringe.  Your care team may wrap an elastic bandage around your knee to keep the swelling from getting worse. You may need to keep your knee in a knee immobilizer and use crutches to protect your knee while you heal.  For complete tears, you and your healthcare team will decide if you should have intense rehabilitation therapy or if you should have surgery followed by rehab. A torn meniscal cannot be sewn back together. The ligament must be surgically reconstructed by taking ligaments or tendons from another part of your leg and connecting them to the thighbone and shinbone.  If you have a completely torn mensical and it is not repaired with surgery, the effects will be life-long. Your knee may feel loose and feel like it will give way when you are running and making quick turns. Rehabilitation exercises and a special brace will help improve these symptoms. If you can do your normal activities without pain and are willing to give up activities that put extra stress on your knee, you may not need surgery.    You may consider having reconstructive meniscal tear surgery if:  Your knee is unstable and gives out during routine or athletic activity.  You are a  and your knee could be unstable and give out during your sport (for example, basketball, football, or soccer).  You are not willing to give up sports that involve pivoting and cutting, like soccer and  basketball.  You want to prevent further injury to your knee. An unstable knee may lead to more injuries and arthritis.    HOW CAN I MANAGE MENISCAL TEAR?  Follow your healthcare team's instructions, including any recommended physical therapy or exercises.  To keep swelling down and help relieve pain for the first few days after the injury:  Put an ice pack, gel pack, or package of frozen vegetables wrapped in a cloth on the injured area every 3 to 4 hours for up to 20 minutes at a time.  Keep your knee up on a pillow when you sit or lie down.  Take nonprescription pain medicine, such as acetaminophen, ibuprofen, or naproxen. Read the label and take as directed. Unless recommended by your healthcare team, you should not take this medicine for more than 10 days.    Knee Exercises (Meniscal tear)    You may do the first 7 exercises right away. You may do the rest of the exercises when the pain in your knee has decreased.    Passive knee extension: Do this exercise if you are unable to extend your knee fully. While lying on your back, place a rolled-up towel under the heel of your injured leg so the heel is about 6 inches off the ground. Relax your leg muscles and let gravity slowly straighten your knee. Try to hold this position for 2 minutes. Repeat 3 times. You may feel some discomfort while doing this exercise. Do the exercise several times a day.  This exercise can also be done while sitting in a chair with your heel on another chair or stool.    Heel slide: Sit on a firm surface with your legs straight in front of you. Slowly slide the heel of the foot on your injured side toward your buttock by pulling your knee toward your chest as you slide the heel. Return to the starting position. Do 2 sets of 15.  Standing calf stretch: Stand facing a wall with your hands on the wall at about eye level. Keep your injured leg back with your heel on the floor. Keep the other leg forward with the knee bent. Turn your back foot  slightly inward (as if you were pigeon-toed). Slowly lean into the wall until you feel a stretch in the back of your calf. Hold the stretch for 15 to 30 seconds. Return to the starting position. Repeat 3 times. Do this exercise several times each day.    Hamstring stretch on wall: Lie on your back with your buttocks close to a doorway. Stretch your uninjured leg straight out in front of you on the floor through the doorway. Raise your injured leg and rest it against the wall next to the door frame. Keep your leg as straight as possible. You should feel a stretch in the back of your thigh. Hold this position for 15 to 30 seconds. Repeat 3 times.  Straight leg raise: Lie on your back with your legs straight out in front of you. Bend the knee on your uninjured side and place the foot flat on the floor. Tighten the thigh muscle on your injured side and lift your leg about 8 inches off the floor. Keep your leg straight and your thigh muscle tight. Slowly lower your leg back down to the floor. Do 2 sets of 15.    Prone hip extension: Lie on your stomach with your legs straight out behind you. Fold your arms under your head and rest your head on your arms. Draw your belly button in towards your spine and tighten your abdominal muscles. Tighten the buttocks and thigh muscles of the leg on your injured side and lift the leg off the floor about 8 inches. Keep your leg straight. Hold for 5 seconds. Then lower your leg and relax. Do 2 sets of 15.  Clam exercise: Lie on your uninjured side with your hips and knees bent and feet together. Slowly raise your top leg toward the ceiling while keeping your heels touching each other. Hold for 2 seconds and lower slowly. Do 2 sets of 15 repetitions.    Wall squat with a ball: Stand with your back, shoulders, and head against a wall. Look straight ahead. Keep your shoulders relaxed and your feet 3 feet (90 centimeters) from the wall and shoulder's width apart. Place a soccer or  basketball-sized ball behind your back. Keeping your back against the wall, slowly squat down to a 45-degree angle. Your thighs will not yet be parallel to the floor. Hold this position for 10 seconds and then slowly slide back up the wall. Repeat 10 times. Build up to 2 sets of 15.  Step-up: Stand with the foot of your injured leg on a support 3 to 5 inches (8 to 13 centimeters) high --like a small step or block of wood. Keep your other foot flat on the floor. Shift your weight onto the injured leg on the support. Straighten your injured leg as the other leg comes off the floor. Return to the starting position by bending your injured leg and slowly lowering your uninjured leg back to the floor. Do 2 sets of 15.    Knee stabilization: Wrap a piece of elastic tubing around the ankle of your uninjured leg. Tie a knot in the other end of the tubing and close it in a door at about ankle height.  Stand facing the door on the leg without tubing (your injured leg) and bend your knee slightly, keeping your thigh muscles tight. Stay in this position while you move the leg with the tubing (the uninjured leg) straight back behind you. Do 2 sets of 15.  Turn 90 degrees so the leg without tubing is closest to the door. Move the leg with tubing away from your body. Do 2 sets of 15.  Turn 90 degrees again so your back is to the door. Move the leg with tubing straight out in front of you. Do 2 sets of 15.  Turn your body 90 degrees again so the leg with tubing is closest to the door. Move the leg with tubing across your body. Do 2 sets of 15.  Hold onto a chair if you need help balancing. This exercise can be made more challenging by standing on a firm pillow or foam mat while you move the leg with tubing.    Resisted terminal knee extension: Make a loop with a piece of elastic tubing by tying a knot in both ends. Close the knot in a door at knee height. Step into the loop with your injured leg so the tubing is around the back of  your knee. Lift the other foot off the ground and hold onto a chair for balance, if needed. Bend the knee with tubing about 45 degrees. Slowly straighten your leg, keeping your thigh muscle tight as you do this. Repeat 15 times. Do 2 sets of 15. If you need an easier way to do this, stand on both legs for better support while you do the exercise.  If you have access to a wobble board, do the following exercises:            Developed by [employer].  Published by [employer].  Copyright  2014 [employer] and/or one of its subsidiaries. All rights reserved.

## 2023-09-26 ENCOUNTER — TELEPHONE (OUTPATIENT)
Dept: INTERNAL MEDICINE | Facility: CLINIC | Age: 73
End: 2023-09-26
Payer: MEDICARE

## 2023-09-26 NOTE — TELEPHONE ENCOUNTER
"Patient called back due to a message on his appt with Dr. Youssef: \"(PLEASE ASSIST PATIENT IN RESCHEDULING WITH HIS NORMAL PCP INSTEAD OF ME -BL)  AWV\"    Pt is requesting an AWV before October 26th before he leaves out of town. Dr. Stephens does not have availability before then for an AWV that is why he scheduled with Dr. Youssef. Does Dr. Stephens want to approve pt for an sometime AWV before Oct 26th? (He is not able to have a visit on Oct 10th or 23rd).   "

## 2023-09-26 NOTE — TELEPHONE ENCOUNTER
Spoke with patient to relay PCP recommendations. RN assisted patient to be rescheduled with PCP per PCP guidelines 9/29/2023. Patient agreed with plan of care and did not have any additional questions or concerns.

## 2023-09-26 NOTE — TELEPHONE ENCOUNTER
Please contact the patient and help schedule the patient for an in person office appointment with me for their annual wellness exam and review of all his other issues at their convenience in the next few weeks.  OK to use any open same day, next day, or virtual appointment spot to schedule this office visit.  Cancel the appointment with Dr. Youssef.    If the patient declines to schedule a follow up appointment, please make a notation and let me know.    Close encounter when done.

## 2023-09-29 ENCOUNTER — OFFICE VISIT (OUTPATIENT)
Dept: INTERNAL MEDICINE | Facility: CLINIC | Age: 73
End: 2023-09-29
Payer: MEDICARE

## 2023-09-29 VITALS
WEIGHT: 271.6 LBS | SYSTOLIC BLOOD PRESSURE: 120 MMHG | DIASTOLIC BLOOD PRESSURE: 76 MMHG | HEIGHT: 69 IN | OXYGEN SATURATION: 98 % | RESPIRATION RATE: 16 BRPM | HEART RATE: 85 BPM | BODY MASS INDEX: 40.23 KG/M2 | TEMPERATURE: 98.9 F

## 2023-09-29 DIAGNOSIS — I77.810 ASCENDING AORTA DILATATION (H): ICD-10-CM

## 2023-09-29 DIAGNOSIS — I77.819 AORTIC ECTASIA, UNSPECIFIED SITE (H): ICD-10-CM

## 2023-09-29 DIAGNOSIS — E66.01 SEVERE OBESITY (BMI 35.0-35.9 WITH COMORBIDITY) (H): ICD-10-CM

## 2023-09-29 DIAGNOSIS — E78.5 HYPERLIPIDEMIA LDL GOAL <70: ICD-10-CM

## 2023-09-29 DIAGNOSIS — G47.33 OBSTRUCTIVE SLEEP APNEA ON CPAP: ICD-10-CM

## 2023-09-29 DIAGNOSIS — I21.4 NSTEMI (NON-ST ELEVATED MYOCARDIAL INFARCTION) (H): ICD-10-CM

## 2023-09-29 DIAGNOSIS — K51.40 INFLAMMATORY PSEUDOTUMOR OF COLON (H): ICD-10-CM

## 2023-09-29 DIAGNOSIS — I10 BENIGN ESSENTIAL HYPERTENSION: ICD-10-CM

## 2023-09-29 DIAGNOSIS — J44.9 CHRONIC OBSTRUCTIVE PULMONARY DISEASE, UNSPECIFIED COPD TYPE (H): ICD-10-CM

## 2023-09-29 DIAGNOSIS — R60.0 BILATERAL LOWER EXTREMITY EDEMA: ICD-10-CM

## 2023-09-29 DIAGNOSIS — I25.10 CORONARY ARTERY DISEASE INVOLVING NATIVE CORONARY ARTERY OF NATIVE HEART WITHOUT ANGINA PECTORIS: ICD-10-CM

## 2023-09-29 DIAGNOSIS — I35.0 NONRHEUMATIC AORTIC VALVE STENOSIS: ICD-10-CM

## 2023-09-29 DIAGNOSIS — E11.59 TYPE 2 DIABETES MELLITUS WITH OTHER CIRCULATORY COMPLICATION, WITHOUT LONG-TERM CURRENT USE OF INSULIN (H): ICD-10-CM

## 2023-09-29 DIAGNOSIS — Z00.00 MEDICARE ANNUAL WELLNESS VISIT, SUBSEQUENT: Primary | ICD-10-CM

## 2023-09-29 PROCEDURE — 99214 OFFICE O/P EST MOD 30 MIN: CPT | Mod: 25 | Performed by: INTERNAL MEDICINE

## 2023-09-29 PROCEDURE — G0439 PPPS, SUBSEQ VISIT: HCPCS | Performed by: INTERNAL MEDICINE

## 2023-09-29 RX ORDER — ROSUVASTATIN CALCIUM 40 MG/1
40 TABLET, COATED ORAL DAILY
Qty: 90 TABLET | Refills: 3 | Status: SHIPPED | OUTPATIENT
Start: 2023-09-29

## 2023-09-29 RX ORDER — FUROSEMIDE 20 MG
40 TABLET ORAL EVERY MORNING
Qty: 180 TABLET | Refills: 3 | Status: SHIPPED | OUTPATIENT
Start: 2023-09-29

## 2023-09-29 RX ORDER — LISINOPRIL 40 MG/1
40 TABLET ORAL DAILY
Qty: 90 TABLET | Refills: 3 | Status: SHIPPED | OUTPATIENT
Start: 2023-09-29 | End: 2024-09-10

## 2023-09-29 RX ORDER — METOPROLOL SUCCINATE 50 MG/1
75 TABLET, EXTENDED RELEASE ORAL DAILY
Qty: 135 TABLET | Refills: 3 | Status: SHIPPED | OUTPATIENT
Start: 2023-09-29

## 2023-09-29 RX ORDER — AMLODIPINE BESYLATE 5 MG/1
5 TABLET ORAL DAILY
Qty: 90 TABLET | Refills: 3 | Status: SHIPPED | OUTPATIENT
Start: 2023-09-29

## 2023-09-29 NOTE — PROGRESS NOTES
SUBJECTIVE:   Cornelius is a 73 year old who presents for Preventive Visit.      Are you in the first 12 months of your Medicare coverage?  No    HPI        Have you ever done Advance Care Planning? (For example, a Health Directive, POLST, or a discussion with a medical provider or your loved ones about your wishes):        Fall risk  Fallen 2 or more times in the past year?: No  Any fall with injury in the past year?: No    Cognitive Screening   1) Repeat 3 items (Leader, Season, Table)    2) Clock draw: NORMAL  3) 3 item recall: Recalls 3 objects  Results: 3 items recalled: COGNITIVE IMPAIRMENT LESS LIKELY    Mini-CogTM Copyright S Janene. Licensed by the author for use in [facility]; reprinted with permission ([email or web address]@[email or web address]). All rights reserved.      Do you have sleep apnea, excessive snoring or daytime drowsiness? : no    Reviewed and updated as needed this visit by clinical staff                  Reviewed and updated as needed this visit by Provider                 Social History     Tobacco Use    Smoking status: Former     Packs/day: 0.75     Years: 20.00     Pack years: 15.00     Types: Cigarettes     Quit date: 2006     Years since quittin.3    Smokeless tobacco: Never    Tobacco comments:     had quit for 1 year, smoked on and off for years, quit again    Substance Use Topics    Alcohol use: Yes     Alcohol/week: 4.0 standard drinks of alcohol     Types: 4 Glasses of wine per week     Comment: 2 glasses of wine daily              2022    12:14 PM   Alcohol Use   Prescreen: >3 drinks/day or >7 drinks/week? Yes   AUDIT SCORE  6     Do you have a current opioid prescription? No  Do you use any other controlled substances or medications that are not prescribed by a provider? None          1.) Hypertension:  History of hypertension, on medication.  No reported side effects from medications.    Reviewed last 6 BP readings in chart:  BP Readings from Last 6 Encounters:   23 120/76    09/13/23 120/83   08/07/23 108/69   08/01/23 126/84   07/13/23 112/76   09/20/22 132/79     No active cardiac complaints or symptoms with exercise.       2. )  Diabetes:  In regards specifically to the patient's diabetes, they reports no unusual symptoms.  Medication compliance: good  Diabetic diet compliance: good    Patient reports no significant episodes of hypo- or hyperglycemia    Diabetic complications: coronary artery disease      Most  recent labs:    Lab Results   Component Value Date    A1C 6.5 07/25/2023    A1C 6.1 07/31/2020    A1C 6.3 08/08/2019    A1C 6.0 09/18/2018    A1C 6.1 09/06/2017    A1C 6.4 10/21/2016    A1C 6.4 02/22/2016    A1C 6.4 06/23/2015    A1C 6.2 01/23/2015    A1C 6.1 10/30/2014    A1C 6.2 07/25/2014    A1C 5.9 03/15/2013    A1C 6.1 01/10/2012    A1C 6.1 10/28/2010    A1C 5.9 07/14/2009    A1C 5.7 08/31/2007          3.  Has history of atrial fibrillation.    No palpitations, no dizziness, no dyspnea on exertion, no shortness of breath.  No racing heart, no fast heart rates.    Taking medications as ordered, no side effects reported.     4.)  COPD remains stable.  Has not had any recent breathing troubles beyond usual baseline.  Has not any acute respiratory events.  Remains with intermittent cough, mild shortness of breath with overexertion as per usual.  Using medication as directed with reported side effects.   Was given advair to use this spring, but he has not used it much.   Uses the albtuerol MDI primarily.       5.  Prior of coronary artery disease as listed in medical history.  No current or recent cardiovascaulr symptoms.   No shortness of breath, no episodes of chest pain/pressure, no dyspnea on exertion, no changes in his abiliy to perform physical exertion or tasks.  Takes the same amount of time to perform similar physical tasks.        Current providers sharing in care for this patient include:   Patient Care Team:  Sinan Stephens MD as PCP - General  Angelo  "Sinan Pascual MD as Assigned PCP  Duc Du MD as Assigned Heart and Vascular Provider  Vaughn Taylor MD as Assigned Sleep Provider    The following health maintenance items are reviewed in Epic and correct as of today:  Health Maintenance   Topic Date Due    MICROALBUMIN  Never done    DIABETIC FOOT EXAM  Never done    COPD ACTION PLAN  Never done    ZOSTER IMMUNIZATION (1 of 2) Never done    INFLUENZA VACCINE (1) 09/01/2023    COVID-19 Vaccine (6 - 2023-24 season) 09/01/2023    ANNUAL REVIEW OF HM ORDERS  09/20/2023    EYE EXAM  10/06/2023    A1C  01/25/2024    BMP  07/25/2024    LIPID  07/25/2024    MEDICARE ANNUAL WELLNESS VISIT  09/29/2024    FALL RISK ASSESSMENT  09/29/2024    COLORECTAL CANCER SCREENING  05/09/2026    DTAP/TDAP/TD IMMUNIZATION (2 - Td or Tdap) 09/18/2027    ADVANCE CARE PLANNING  09/20/2027    SPIROMETRY  Completed    HEPATITIS C SCREENING  Completed    PHQ-2 (once per calendar year)  Completed    Pneumococcal Vaccine: 65+ Years  Completed    AORTIC ANEURYSM SCREENING (SYSTEM ASSIGNED)  Completed    IPV IMMUNIZATION  Aged Out    HPV IMMUNIZATION  Aged Out    MENINGITIS IMMUNIZATION  Aged Out         **I reviewed the information recorded in the patient's EPIC chart (including but not limited to medical history, surgical history, family history, problem list, medication list, and allergy list) and updated the information as indicated based on the patients reported information.           Review of Systems  Constitutional, HEENT, cardiovascular, pulmonary, gi and gu systems are negative, except as otherwise noted.    OBJECTIVE:   /76   Pulse 85   Temp 98.9  F (37.2  C) (Oral)   Resp 16   Ht 1.753 m (5' 9\")   Wt 123.2 kg (271 lb 9.6 oz)   SpO2 98%   BMI 40.11 kg/m   Estimated body mass index is 40.11 kg/m  as calculated from the following:    Height as of this encounter: 1.753 m (5' 9\").    Weight as of this encounter: 123.2 kg (271 lb 9.6 oz).  Physical Exam  GENERAL " alert and no distress  EYES:  Normal sclera,conjunctiva, EOMI  HENT: oral and posterior pharynx without lesions or erythema, facies symmetric  NECK: Neck supple. No LAD, without thyroidmegaly.  RESP: Clear to ausculation bilaterally without wheezes or crackles. Normal BS in all fields.  CV: Irregular rhythm ( consistent with known atrial fibrillation), regular rate; 2/6 systolic murmur, normal S1S2 without rubs or gallops   LYMPH: no cervical lymph adenopathy appreciated  MS: extremities- no gross deformities of the visible extremities noted,   EXT:  no lower extremity edema  PSYCH: Alert and oriented times 3; speech- coherent  SKIN:  No obvious significant skin lesions on visible portions of face     Diagnostic Test Results:  Labs reviewed in Epic    ASSESSMENT / PLAN:     (Z00.00) Medicare annual wellness visit, subsequent  (primary encounter diagnosis)  Comment: Discussed cardiac disease risk factors and cardiac disease risk factor modification, including diabetes screening, blood pressure screening (and management if indicated), and cholesterol screening.   Reviewed immunzation guidelines, including pneumococcal vaccines, annual influenza, and shingles vaccines.   Discussed routine cancer screenings, including skin cancer, colon cancer screening for everyone until age 80, prostate cancer screening in men until age 75, mammogram and PAP/pelvic for women until age 75.   Recommended regular dentist visits to care for remaining teeth.   Recommended regular screening for vision and glaucoma.   Recommended safe driving and accident avoidance.   Plan:     (J44.9) Chronic obstructive pulmonary disease, unspecified COPD type (H)  Comment: This condition is currently controlled on the current medical regimen.  Continue current therapy.   Use the Advair or similar inhaler as needed during the most bothersome periods of breathing  Plan:     (E11.59) Type 2 diabetes mellitus with other circulatory complication, without  long-term current use of insulin (H)  Comment: The patient has been angel the line between prediabetes and type 2 diabetes.  Hemoglobin A1c at 6.5 technically maximum type II diabetic.  No medications required at this time, continue diet and lifestyle issues specifically decrease the intake of carbohydrates as best possible.  If the A1c continues to climb despite these measures, then he will need a medication.  Ideally, the GLP-1 medication would be ideal for him for diabetic management, weight loss and to reduce future cardiac events.  Plan:     (I77.819) Aortic ectasia, unspecified site (H)  Comment: This condition is currently controlled on the current medical regimen.  Continue current therapy.   Discussed secondary risk factor modification and recommended continuing aggressive management of these items.   Plan:     (G47.33,  Z99.89) Obstructive sleep apnea on CPAP  Comment: This condition is currently controlled on the current medical regimen.  Continue current therapy.   Plan:     (E66.01,  Z68.35) Severe obesity (BMI 35.0-35.9 with comorbidity) (H)  Comment: Obesity is contributing to hypertension, hyperlipidemia, sleep apnea, diabetes..  Current BMI is: Body mass index is 40.11 kg/m ..  Reviewed weight patterns.   Obesity as a biological preventable and treatable disease, which is associated with significantly increased risk of many acute and chronic health conditions.   Obesity has now been recognized as a chronic disease by the American Medical Association.  Obesity has serious co-morbidities associated with obesity including increased risk for hypertension, stroke, coronary artery disease, dyslipidemia, Type II diabetes, depression, sleep apnea, cancers of the colon, breast and endometrium, obstructive sleep apnea, osteoarthritis and female infertility.  Recommended regular aerobic exercise, recommended improved diet aiming at lowering amount of processed foods, lower sugars, moderation/reduction of  simple carbs and fat in the diet, establishing more regular meal times, always eating breakfast, front loading some of the calories and adding more protein to the diet.     Referral to Weight Loss Clinic for discussion of more aggressive measures for weight loss can be considered (including medical options (e.g. GLP-1, or appetite suppressants, etc.) or bariatric surgical procedures.   Plan:     (I25.10) Coronary artery disease involving native coronary artery of native heart without angina pectoris  Comment: The patient does not report any signs or symptoms of angina or active cardiac ischemia.   They do not report any relative changes in their ability to perform physical activities over the past year.    We discussed aggressive secondary risk factor modification, including aggressive BP control (under 130/ideally), aggressive LDL lowering with statin (goal under 100 for sure/under 70 ideally), no smoking, diabetes prevention/management, no smoking, and use of either ASA or similar anti platelet agent if tolerated.     Plan:     (K51.40) Inflammatory pseudotumor of colon (H)  Comment: This condition is currently controlled on the current medical regimen.  Continue current therapy.   Plan:     (I35.0) Nonrheumatic aortic valve stenosis  Comment: This condition is currently controlled on the current medical regimen.  Continue current therapy.   Plan:     (I10) Benign essential hypertension  Comment: This condition is currently controlled on the current medical regimen.  Continue current therapy.   Plan:     (I77.810) Ascending aorta dilatation (H)  Comment: This condition is currently controlled on the current medical regimen.  Continue current therapy.   Discussed secondary risk factor modification and recommended continuing aggressive management of these items.   Plan:        Patient has been advised of split billing requirements and indicates understanding: Yes      COUNSELING:  Reviewed preventive health  "counseling, as reflected in patient instructions       Regular exercise       Healthy diet/nutrition       Vision screening       Hearing screening       Dental care       Fall risk prevention       Immunizations  up to date  Covid vaccines are now recommended annually for all adults.  The most recent updated Covid vaccine has been approved and is now widely available at any pharmacy or vaccine clinic location.  You may receive the COVID-vaccine at the same time as the annual influenza vaccine if desired.   RSV vaccines are soon available.  The will help provide protection against respiratory syncytial virus (RSV), a common upper respiratory virus that is known to cause severe inflammation in the airways.  This vaccine is recommended for adults over age 60, especially those with high risk conditions and/or chronic respiratory illnesses such as asthma or COPD.  This vaccine should be available at most pharmacies and clinics.            Colon cancer screening       Prostate cancer screening      BMI:   Estimated body mass index is 40.11 kg/m  as calculated from the following:    Height as of this encounter: 1.753 m (5' 9\").    Weight as of this encounter: 123.2 kg (271 lb 9.6 oz).         He reports that he quit smoking about 17 years ago. His smoking use included cigarettes. He has a 15.00 pack-year smoking history. He has never used smokeless tobacco.      Appropriate preventive services were discussed with this patient, including applicable screening as appropriate for fall prevention, nutrition, physical activity, Tobacco-use cessation, weight loss and cognition.  Checklist reviewing preventive services available has been given to the patient.    Reviewed patients plan of care and provided an AVS. The  for Giacomo meets the Care Plan requirement. This Care Plan has been established and reviewed with the .          Sinan Stephens MD  [facility] " Risks:

## 2023-11-16 ENCOUNTER — PATIENT OUTREACH (OUTPATIENT)
Dept: GASTROENTEROLOGY | Facility: CLINIC | Age: 73
End: 2023-11-16
Payer: MEDICARE

## 2024-02-21 ENCOUNTER — MYC MEDICAL ADVICE (OUTPATIENT)
Dept: CARDIOLOGY | Facility: CLINIC | Age: 74
End: 2024-02-21
Payer: MEDICARE

## 2024-02-21 ENCOUNTER — NURSE TRIAGE (OUTPATIENT)
Dept: INTERNAL MEDICINE | Facility: CLINIC | Age: 74
End: 2024-02-21

## 2024-02-21 NOTE — TELEPHONE ENCOUNTER
Reason for Disposition   COVID-19 infection suspected by caller or triager and mild symptoms (cough, fever, or others) and has not gotten tested yet    Additional Information   Negative: SEVERE difficulty breathing (e.g., struggling for each breath, speaks in single words)   Negative: Difficult to awaken or acting confused (e.g., disoriented, slurred speech)   Negative: Bluish (or gray) lips or face now   Negative: Shock suspected (e.g., cold/pale/clammy skin, too weak to stand, low BP, rapid pulse)   Negative: Sounds like a life-threatening emergency to the triager   Negative: Diagnosed or suspected COVID-19 and symptoms lasting 3 or more weeks   Negative: COVID-19 exposure and no symptoms   Negative: COVID-19 vaccine reaction suspected (e.g., fever, headache, muscle aches) occurring 1 to 3 days after getting vaccine   Negative: COVID-19 vaccine, questions about   Negative: Lives with someone known to have influenza (flu test positive) and flu-like symptoms (e.g., cough, runny nose, sore throat, SOB; with or without fever)   Negative: Possible COVID-19 symptoms and triager concerned about severity of symptoms or other causes   Negative: COVID-19 and breastfeeding, questions about   Negative: SEVERE or constant chest pain or pressure  (Exception: Mild central chest pain, present only when coughing.)   Negative: MODERATE difficulty breathing (e.g., speaks in phrases, SOB even at rest, pulse 100-120)   Negative: Headache and stiff neck (can't touch chin to chest)   Negative: Oxygen level (e.g., pulse oximetry) 90% or lower   Negative: Chest pain or pressure  (Exception: MILD central chest pain, present only when coughing.)   Negative: Drinking very little and dehydration suspected (e.g., no urine > 12 hours, very dry mouth, very lightheaded)   Negative: Patient sounds very sick or weak to the triager   Negative: MILD difficulty breathing (e.g., minimal/no SOB at rest, SOB with walking, pulse <100)   Negative: Fever >  "103 F (39.4 C)   Negative: Fever > 101 F (38.3 C) and over 60 years of age   Negative: Fever > 100.0 F (37.8 C) and bedridden (e.g., CVA, chronic illness, recovering from surgery)   Negative: HIGH RISK patient (e.g., weak immune system, age > 64 years, obesity with BMI of 30 or higher, pregnant, chronic lung disease or other chronic medical condition) and COVID symptoms (e.g., cough, fever)  (Exceptions: Already seen by doctor or NP/PA and no new or worsening symptoms.)   Negative: HIGH RISK patient and influenza is widespread in the community and ONE OR MORE respiratory symptoms: cough, sore throat, runny or stuffy nose   Negative: HIGH RISK patient and influenza exposure within the last 7 days and ONE OR MORE respiratory symptoms: cough, sore throat, runny or stuffy nose   Negative: Oxygen level (e.g., pulse oximetry) 91 to 94%   Negative: COVID-19 infection suspected by caller or triager and mild symptoms (cough, fever, or others) and negative COVID-19 rapid test   Negative: Fever present > 3 days (72 hours)   Negative: Fever returns after gone for over 24 hours and symptoms worse or not improved   Negative: Continuous (nonstop) coughing interferes with work or school and no improvement using cough treatment per Care Advice   Negative: Cough present > 3 weeks   Negative: COVID-19 diagnosed by positive lab test (e.g., PCR, rapid self-test kit) and NO symptoms (e.g., cough, fever, others)    Answer Assessment - Initial Assessment Questions  1. COVID-19 DIAGNOSIS: \"How do you know that you have COVID?\" (e.g., positive lab test or self-test, diagnosed by doctor or NP/PA, symptoms after exposure).      Home test today 2/21/24     2. COVID-19 EXPOSURE: \"Was there any known exposure to COVID before the symptoms began?\" CDC Definition of close contact: within 6 feet (2 meters) for a total of 15 minutes or more over a 24-hour period.       No    3. ONSET: \"When did the COVID-19 symptoms start?\"       Yesterday " "2/20/24    4. WORST SYMPTOM: \"What is your worst symptom?\" (e.g., cough, fever, shortness of breath, muscle aches)      Cough, congestion in nose     5. COUGH: \"Do you have a cough?\" If Yes, ask: \"How bad is the cough?\"        Yes, mild     6. FEVER: \"Do you have a fever?\" If Yes, ask: \"What is your temperature, how was it measured, and when did it start?\"      No 97.9    7. RESPIRATORY STATUS: \"Describe your breathing?\" (e.g., normal; shortness of breath, wheezing, unable to speak)       No change from normal COPD sx    8. BETTER-SAME-WORSE: \"Are you getting better, staying the same or getting worse compared to yesterday?\"  If getting worse, ask, \"In what way?\"      Worse    9. OTHER SYMPTOMS: \"Do you have any other symptoms?\"  (e.g., chills, fatigue, headache, loss of smell or taste, muscle pain, sore throat)      Sore throat     10. HIGH RISK DISEASE: \"Do you have any chronic medical problems?\" (e.g., asthma, heart or lung disease, weak immune system, obesity, etc.)        COPD    11. VACCINE: \"Have you had the COVID-19 vaccine?\" If Yes, ask: \"Which one, how many shots, when did you get it?\"        All of them including booster     12. PREGNANCY: \"Is there any chance you are pregnant?\" \"When was your last menstrual period?\"        No    13. O2 SATURATION MONITOR:  \"Do you use an oxygen saturation monitor (pulse oximeter) at home?\" If Yes, ask \"What is your reading (oxygen level) today?\" \"What is your usual oxygen saturation reading?\" (e.g., 95%)        Yes 95%    Protocols used: Coronavirus (COVID-19) Diagnosed or [id number]    "

## 2024-02-21 NOTE — TELEPHONE ENCOUNTER
RN COVID TREATMENT VISIT  02/21/24      The patient has been triaged and does not require a higher level of care.    Giacomo LANIER Underwood  74 year old  Current weight? 270    Has the patient been seen by a primary care provider at an [facility] or [facility] within the past two years? Yes.   Have you been in close proximity to/do you have a known exposure to a person with a confirmed case of influenza? No.     General treatment eligibility:  Date of positive COVID test (PCR or at home)?  2/21/24    Are you or have you been hospitalized for this COVID-19 infection? No.   Have you received monoclonal antibodies or antiviral treatment for COVID-19 since this positive test? No.   Do you have any of the following conditions that place you at risk of being very sick from COVID-19?   Yes, patient has at least one high risk condition as noted above.     Current COVID symptoms:   - fever or chills  - cough  - shortness of breath or difficulty breathing  - headache  - sore throat  Yes. Patient has at least one symptom as selected.     How many days since symptoms started? 5 days or less. Established patient, 12 years or older weighing at least 88.2 lbs, who has symptoms that started in the past 5 days, has not been hospitalized nor received treatment already, and is at risk for being very sick from COVID-19.     Treatment eligibility by RN:  Are you currently pregnant or nursing? No  Do you have a clinically significant hypersensitivity to nirmatrelvir or ritonavir, or toxic epidermal necrolysis (TEN) or Márquez-Cem Syndrome? No  Do you have a history of hepatitis, any hepatic impairment on the Problem List (such as Child-Quiroga Class C, cirrhosis, fatty liver disease, alcoholic liver disease), or was the last liver lab (hepatic panel, ALT, AST, ALK Phos, bilirubin) elevated in the past 6 months? No  Do you have any history of severe renal impairment (eGFR < 30mL/min)? No    Is patient eligible to  continue? Yes, patient meets all eligibility requirements for the RN COVID treatment (as denoted by all no responses above).     Current Outpatient Medications   Medication Sig Dispense Refill    albuterol (PROAIR HFA/PROVENTIL HFA/VENTOLIN HFA) 108 (90 Base) MCG/ACT inhaler Inhale 2 puffs into the lungs every 4 hours as needed for shortness of breath or wheezing 18 g 3    amLODIPine (NORVASC) 5 MG tablet Take 1 tablet (5 mg) by mouth daily 90 tablet 3    aspirin 81 MG tablet Take 1 tablet by mouth daily.      fluorouracil (EFUDEX) 5 % external cream       fluticasone-salmeterol (AIRDUO RESPICLICK) 113-14 MCG/ACT inhaler Inhale 1 puff into the lungs 2 times daily Use twice daily in the main seasons for breathing problems (Patient not taking: Reported on 7/13/2023) 1 each 3    furosemide (LASIX) 20 MG tablet Take 2 tablets (40 mg) by mouth every morning 180 tablet 3    lisinopril (ZESTRIL) 40 MG tablet Take 1 tablet (40 mg) by mouth daily 90 tablet 3    metoprolol succinate ER (TOPROL XL) 50 MG 24 hr tablet Take 1.5 tablets (75 mg) by mouth daily 135 tablet 3    rosuvastatin (CRESTOR) 40 MG tablet Take 1 tablet (40 mg) by mouth daily 90 tablet 3       Medications from List 1 of the standing order (on medications that exclude the use of Paxlovid) that patient is taking: NONE. Is patient taking Kira's Wort? No  Is patient taking Kira's Wort or any meds from List 1? No.   Medications from List 2 of the standing order (on meds that provider needs to adjust) that patient is taking: amlodipine (Norvasc), explained a provider visit is necessary to discuss medication adjustments while taking Paxlovid. Is patient on any of the meds from List 2? Yes. Patient informed writer that he is [place] of [place]. Writer advised pt to be seen by a provider down there or be seen in  in [place].     CARRILLO Leung RN

## 2024-03-12 ENCOUNTER — TELEPHONE (OUTPATIENT)
Dept: CARDIOLOGY | Facility: CLINIC | Age: 74
End: 2024-03-12
Payer: MEDICARE

## 2024-03-23 ENCOUNTER — HEALTH MAINTENANCE LETTER (OUTPATIENT)
Age: 74
End: 2024-03-23

## 2024-03-27 ENCOUNTER — TRANSFERRED RECORDS (OUTPATIENT)
Dept: HEALTH INFORMATION MANAGEMENT | Facility: CLINIC | Age: 74
End: 2024-03-27

## 2024-07-18 ENCOUNTER — OFFICE VISIT (OUTPATIENT)
Dept: CARDIOLOGY | Facility: CLINIC | Age: 74
End: 2024-07-18
Payer: MEDICARE

## 2024-07-18 ENCOUNTER — HOSPITAL ENCOUNTER (OUTPATIENT)
Dept: CARDIOLOGY | Facility: CLINIC | Age: 74
Discharge: HOME | End: 2024-07-18
Attending: INTERNAL MEDICINE | Admitting: INTERNAL MEDICINE
Payer: MEDICARE

## 2024-07-18 VITALS
SYSTOLIC BLOOD PRESSURE: 116 MMHG | WEIGHT: 271.2 LBS | HEART RATE: 93 BPM | OXYGEN SATURATION: 94 % | BODY MASS INDEX: 40.17 KG/M2 | HEIGHT: 69 IN | DIASTOLIC BLOOD PRESSURE: 79 MMHG

## 2024-07-18 DIAGNOSIS — Q24.9 ADULT CONGENITAL HEART DISEASE: ICD-10-CM

## 2024-07-18 DIAGNOSIS — I35.0 AORTIC VALVE STENOSIS, ETIOLOGY OF CARDIAC VALVE DISEASE UNSPECIFIED: ICD-10-CM

## 2024-07-18 DIAGNOSIS — Q23.81 BICUSPID AORTIC VALVE: ICD-10-CM

## 2024-07-18 DIAGNOSIS — I10 BENIGN ESSENTIAL HYPERTENSION: ICD-10-CM

## 2024-07-18 DIAGNOSIS — J44.9 CHRONIC OBSTRUCTIVE PULMONARY DISEASE, UNSPECIFIED COPD TYPE (H): ICD-10-CM

## 2024-07-18 DIAGNOSIS — I48.0 PAROXYSMAL ATRIAL FIBRILLATION (H): ICD-10-CM

## 2024-07-18 DIAGNOSIS — I77.819 AORTIC DILATATION (H): Primary | ICD-10-CM

## 2024-07-18 PROCEDURE — 93320 DOPPLER ECHO COMPLETE: CPT | Mod: 26 | Performed by: PEDIATRICS

## 2024-07-18 PROCEDURE — 93000 ELECTROCARDIOGRAM COMPLETE: CPT | Performed by: INTERNAL MEDICINE

## 2024-07-18 PROCEDURE — 93325 DOPPLER ECHO COLOR FLOW MAPG: CPT | Mod: 26 | Performed by: PEDIATRICS

## 2024-07-18 PROCEDURE — 93325 DOPPLER ECHO COLOR FLOW MAPG: CPT

## 2024-07-18 PROCEDURE — 99215 OFFICE O/P EST HI 40 MIN: CPT | Performed by: INTERNAL MEDICINE

## 2024-07-18 PROCEDURE — 93303 ECHO TRANSTHORACIC: CPT | Mod: 26 | Performed by: PEDIATRICS

## 2024-07-18 NOTE — PATIENT INSTRUCTIONS
Thank you for visiting the Adult Congenital and Cardiovascular Genetics Clinic at the [facility].    Cardiology Providers you saw during your visit:  ESTER Du MD    Diagnosis:  BAV and CAD    Results:  ESTER Du MD reviewed the results of your EKG and echocardiogram testing today in clinic.    ______________________________________________________________________________    Recommendations from your Cardiology Provider:    Referral placed to pulmonary for shortness of breath and COPD.   If you don t hear from a representative within 2 business days, please call [phone].        2. Complete a nuclear med Lexiscan stress test, we will call you to schedule and watch for results      General Cardiac Recommendations:  Continue to eat a heart healthy, low salt diet.  Continue to get 20-30 minutes of aerobic activity, 4-5 days per week.  Examples of aerobic activity include walking, running, swimming, cycling, etc.  Continue to observe good oral hygiene, with regular dental visits.    ____________________________________________________________________________________________________    SBE prophylaxis:   Yes____  No__X__    SBE prophylaxis applies to patients with certain heart conditions who are recommended to take antibiotics before dental appointments and other specific procedures. These antibiotics are to help prevent an infection of the heart (endocarditis) that certain patients are at higher risk of developing. The guidelines used come from the American Heart Association and are periodically updated.    ____________________________________________________________________________________________________    FASTING CHOLESTEROL was checked in the last 5 years YES__X__  NO____ (2023)  If no, please follow up with your primary care physician. You should have a cholesterol screening every 5 years at minimum, and every year if taking a medication for your cholesterol levels.      ____________________________________________________________________________________________________    Follow-up Plan:  Follow up with  March with an echocardiogram and chest CT to look at your aorta/aortic valve prior    ____________________________________________________________________________________________________    If you have questions or concerns, please call us at [phone] or contact us through [employer].    Sindhu Brito RN, BSN    Elva Clayton (Scheduling)  Nurse Care Coordinator     Clinic   Adult Congenital and CV Genetics   Adult Congenital and CV Genetic  [facility]   [facility]  (P) [phone]     (P) [phone]  (F) [phone]     (F) [phone]      For after hours urgent needs, call [phone] and ask to speak to the Adult Congenital Physician on call.  Mention Job Code 0401.    For emergencies call [phone].    [facility]  [facility]  Mail Code [id number]  [address] [place]  [address]

## 2024-07-18 NOTE — LETTER
7/18/2024    Sinan Stephens MD  [address] [facility] [address]    RE: Giacomo Solis       Dear Colleague,     I had the pleasure of seeing Giacomo Solis in the [facility].  CARDIOLOGY CONSULTATION:    Mr. Solis is a very pleasant, 74-year-old gentleman whom I met in 11/2016 when he presented with an NSTEMI.  He was found to have moderate aortic valve stenosis with a mean gradient that has been between 35-33 mmHg and moderate disease in his coronary arteries with small vessels distally.  He has been managed medically and doing well.        His weight has been stable with BMI around 40.  His blood pressure is under control.   He has a history of paroxysmal atrial fibrillation, but did have a GI bleed on Xarelto, and so it was recommended he not restart that.  He is in sinus rhythm today. His brother had his aortic valve replaced in 2018; it was likely bicuspid. Unfortunately his brother passed away in 2023     On Mr. Solis's valve, it is not clear whether it is bicuspid because of the degree of calcification, but given his brother's disease, this is likely the case.    His ascending aorta has been between 3.8-4.1, which is where it was on his echo today.  His mean gradient across his aortic valve has been creeping up and is currently 25-33 mmHg, but stable compared to last year's echo.     He denies any chest pain, tightness.  No syncope or pre-syncope.  He golfs for exercise but uses a cart. He goes to [place] for the Winter.  Mr. Solis does have moderate obstructive sleep apnea and is using the CPAP. He visited with sleep medicine last year to be sure his settings and mask were working ok.      He has been told by his primary that he has COPD. He states that he started having lung issues after going [place] the [place] in [place] on [place] about 5 years ago.  He is asking to see pulmonary.      PAST MEDICAL HISTORY:  Past Medical History:   Diagnosis Date     Atrial fibrillation (H)     paroxysmal    Basal cell carcinoma     COPD (chronic obstructive pulmonary disease) (H) 08/06/2019    FEV1 53 %    Coronary artery disease involving native coronary artery of native heart 11/30/16    NSTEMI (peak troponin 0.05); coronary angiogram showed modeerate nonocclusive disease: LM 30-40 %, LAD 30-40%, CX 30-40 %    Diverticulosis of colon 4/16/12    sigmoid diverticulosis per CT scan; episode acute diverticulitis per CT scan    Heart valve insufficiency     Lipoma of other skin and subcutaneous tissue     Lumbar spinal stenosis 9/1/10    lumbar spinal stenosis at L5    NSTEMI (non-ST elevated myocardial infarction) (H) 11/30/16    NSTEMI (peak troponin 0.05); no acute lesions on angiogram    Obesity (BMI 30-39.9) 7/9/13    BMI 38    Obstructive sleep apnea 10/7/14    per sleep study:  AHI 25/hr, desats to 84%. Supine /hr, only 6 minutes spent supine    Other and unspecified hyperlipidemia     Prediabetes 7/9/13    elevated fasting glucoses    Shingles 11/2017    dx when he was in [place]    Tubular adenoma of colon 8/13/13    3 adenomatous poltyps removed, 4 hyperplastic polyps removed    Unspecified essential hypertension        CURRENT MEDICATIONS:  Current Outpatient Medications   Medication Sig Dispense Refill    albuterol (PROAIR HFA/PROVENTIL HFA/VENTOLIN HFA) 108 (90 Base) MCG/ACT inhaler Inhale 2 puffs into the lungs every 4 hours as needed for shortness of breath or wheezing 18 g 3    amLODIPine (NORVASC) 5 MG tablet Take 1 tablet (5 mg) by mouth daily 90 tablet 3    aspirin 81 MG tablet Take 1 tablet by mouth daily.      furosemide (LASIX) 20 MG tablet Take 2 tablets (40 mg) by mouth every morning 180 tablet 3    lisinopril (ZESTRIL) 40 MG tablet Take 1 tablet (40 mg) by mouth daily 90 tablet 3    metoprolol succinate ER (TOPROL XL) 50 MG 24 hr tablet Take 1.5 tablets (75 mg) by mouth daily 135 tablet 3    rosuvastatin (CRESTOR) 40 MG tablet Take 1 tablet (40 mg)  by mouth daily 90 tablet 3    fluorouracil (EFUDEX) 5 % external cream  (Patient not taking: Reported on 2024)      fluticasone-salmeterol (AIRDUO RESPICLICK) 113-14 MCG/ACT inhaler Inhale 1 puff into the lungs 2 times daily Use twice daily in the main seasons for breathing problems (Patient not taking: Reported on 2023) 1 each 3       PAST SURGICAL HISTORY:  Past Surgical History:   Procedure Laterality Date    COLONOSCOPY  13    3 adenomatous polyps and 4 hyperplastic polyps removed    HC TOOTH EXTRACTION W/FORCEP      4 wisdom teeth removed withou difficulty       ALLERGIES  No known allergies    FAMILY HX:  Family History   Problem Relation Age of Onset    Hypertension Mother         B:1920    Colon Cancer Mother 84    Kidney Cancer Mother 95         age 95    Heart Disease Father         D: 70's  MI    Hypertension Brother         lipids as well    Heart Disease Brother         CABG x 3, mitral valve reaplcement    Colon Cancer Brother 68    Respiratory Brother         PRACHI    Myocardial Infarction Brother 69        sudden cardiac death    Diabetes No family hx of     Cerebrovascular Disease No family hx of        SOCIAL HX:  Social History     Socioeconomic History    Marital status:    Tobacco Use    Smoking status: Former     Current packs/day: 0.00     Average packs/day: 0.8 packs/day for 20.0 years (15.0 ttl pk-yrs)     Types: Cigarettes     Start date: 1986     Quit date: 2006     Years since quittin.2    Smokeless tobacco: Never    Tobacco comments:     had quit for 1 year, smoked on and off for years, quit again    Substance and Sexual Activity    Alcohol use: Yes     Alcohol/week: 4.0 standard drinks of alcohol     Types: 4 Glasses of wine per week     Comment: 2 glasses of wine daily     Drug use: No    Sexual activity: Not Currently   Other Topics Concern    Special Diet No    Exercise No     Social Determinants of Health     Financial Resource Strain:  "Low Risk  (9/28/2023)    Financial Resource Strain     Within the past 12 months, have you or your family members you live with been unable to get utilities (heat, electricity) when it was really needed?: No   Food Insecurity: Low Risk  (9/28/2023)    Food Insecurity     Within the past 12 months, did you worry that your food would run out before you got money to buy more?: No     Within the past 12 months, did the food you bought just not last and you didn t have money to get more?: No   Transportation Needs: Low Risk  (9/28/2023)    Transportation Needs     Within the past 12 months, has lack of transportation kept you from medical appointments, getting your medicines, non-medical meetings or appointments, work, or from getting things that you need?: No   Housing Stability: Low Risk  (9/28/2023)    Housing Stability     Do you have housing? : Yes     Are you worried about losing your housing?: No       ROS:  Constitutional: No fever, chills, or sweats. No weight gain/loss.   ENT: No visual disturbance, ear ache, epistaxis, sore throat.   Allergies/Immunologic: Negative.   Respiratory: No cough, hemoptysis.   Cardiovascular: As per HPI.   GI: No nausea, vomiting, hematemesis, melena, or hematochezia.   : No urinary frequency, dysuria, or hematuria.   Integument: Negative.   Psychiatric: Negative.   Neuro: Negative.   Endocrinology: Negative.   Musculoskeletal: No myalgia.    VITAL SIGNS:  /79   Pulse 93   Ht 1.753 m (5' 9\")   Wt 123 kg (271 lb 3.2 oz)   SpO2 94%   BMI 40.05 kg/m    Body mass index is 40.05 kg/m .  Wt Readings from Last 2 Encounters:   07/18/24 123 kg (271 lb 3.2 oz)   09/29/23 123.2 kg (271 lb 9.6 oz)       PHYSICAL EXAM  Giacomo Solis IS A 74 year old male.in no acute distress.  HEENT: Unremarkable.  Neck: JVP normal.   Lungs: CTA.  Cor: RRR. Normal S1 and S2.  Mid to late peaking systolic murmur.  Abd: Soft, nontender, nondistended.   Extremities: No C/C/E.  Neuro: Grossly " "intact.    LABS    Lab Results   Component Value Date    WBC 5.7 07/25/2023    WBC 5.7 06/27/2021     Lab Results   Component Value Date    RBC 5.70 07/25/2023    RBC 5.63 06/27/2021     Lab Results   Component Value Date    HGB 16.0 07/25/2023    HGB 16.0 06/27/2021     Lab Results   Component Value Date    HCT 49.0 07/25/2023    HCT 48.8 06/27/2021     No components found for: \"MCT\"  Lab Results   Component Value Date    MCV 86 07/25/2023    MCV 87 06/27/2021     Lab Results   Component Value Date    MCH 28.1 07/25/2023    MCH 28.4 06/27/2021     Lab Results   Component Value Date    MCHC 32.7 07/25/2023    MCHC 32.8 06/27/2021     Lab Results   Component Value Date    RDW 13.8 07/25/2023    RDW 14.6 06/27/2021     Lab Results   Component Value Date     07/25/2023     06/27/2021      Recent Labs   Lab Test 07/25/23  1103 09/20/22  1353    139   POTASSIUM 4.1 3.8   CHLORIDE 104 105   CO2 26 26   ANIONGAP 10 8   * 132*   BUN 13.5 16   CR 0.89 0.81   LASHAY 8.8 9.2     Recent Labs   Lab Test 07/25/23  1103 09/20/22  1353   CHOL 142 151   HDL 36* 39*   LDL 80 81   TRIG 130 157*   NHDL 106 112        IMPRESSION, REPORT, PLAN:   1.  Moderate coronary artery disease, managed medically with last cath 12/2016, currently asymptomatic.   2.  Hypertension, reasonably controlled.   3.  History of GI bleed on Xarelto, currently not anticoagulated as a result.   4.  Paroxysmal atrial fibrillation.   5.  Hyperlipidemia, well controlled.   6.  BMI 40  7.  Moderate bicuspid aortic valve stenosis with a mean gradient of 27- 33 mmHg.     8.  Ascending aortic dilatation at 4 cm.   9.  Obstructive sleep apnea using CPAP.   10.  SOB with concerns for COPD     DISCUSSION:  It was a pleasure seeing Mr. Solis in followup.  He is doing well from a cardiovascular standpoint and does not have any concerning cardiac symptoms.  He does have SOB and is interested in seeing pulmonary medicine as his primary told him he " has COPD but he does not feel he has improved with any interventions.      Discussed doing a stress test to evaluate for ischemia as well -- a nuclear stress test was ordered today.  His gradient across his valve is stable.  His murmur is a little more consistent with moderate to severe aortic stenosis so will plan to do a calcium evaluation of his valve with a CT next year and will also assess his aortopathy at that time as well.      He will continue to work on diet and exercise.  Looking forward to returning to [place] in October. Continues to use his camper regularly.      He will let us know if he has any concerning symptoms before then.     It was a pleasure to see him today.  Please do not hesitate to contact me with any questions or concerns.      SADI DU MD    40 minutes face-face, documentation and review of records on day of visit      Thank you for allowing me to participate in the care of your patient.      Sincerely,     Sadi Du MD     [facility]  cc:   Sadi Du MD  [address] [facility] [address]  [place] [address]

## 2024-07-18 NOTE — NURSING NOTE
Cardiac Testing: Patient given instructions regarding  echocardiogram lexiscan and CTA chest. Discussed purpose, preparation, procedure and when to expect results reported back to the patient. Patient demonstrated understanding of this information and agreed to call with further questions or concerns.    Return Appointment: Patient given instructions regarding scheduling next clinic visit. Patient demonstrated understanding of this information and agreed to call with further questions or concerns.    Patient stated he understood all health information given and agreed to call with further questions or concerns.

## 2024-07-18 NOTE — PROGRESS NOTES
CARDIOLOGY CONSULTATION:    Mr. Solis is a very pleasant, 74-year-old gentleman whom I met in 11/2016 when he presented with an NSTEMI.  He was found to have moderate aortic valve stenosis with a mean gradient that has been between 35-33 mmHg and moderate disease in his coronary arteries with small vessels distally.  He has been managed medically and doing well.        His weight has been stable with BMI around 40.  His blood pressure is under control.   He has a history of paroxysmal atrial fibrillation, but did have a GI bleed on Xarelto, and so it was recommended he not restart that.  He is in sinus rhythm today. His brother had his aortic valve replaced in 2018; it was likely bicuspid. Unfortunately his brother passed away in 2023     On Mr. Solis's valve, it is not clear whether it is bicuspid because of the degree of calcification, but given his brother's disease, this is likely the case.    His ascending aorta has been between 3.8-4.1, which is where it was on his echo today.  His mean gradient across his aortic valve has been creeping up and is currently 25-33 mmHg, but stable compared to last year's echo.     He denies any chest pain, tightness.  No syncope or pre-syncope.  He golfs for exercise but uses a cart. He goes to [place] for the Winter.  Mr. Solis does have moderate obstructive sleep apnea and is using the CPAP. He visited with sleep medicine last year to be sure his settings and mask were working ok.      He has been told by his primary that he has COPD. He states that he started having lung issues after going [place] the [place] in [place] on [place] about 5 years ago.  He is asking to see pulmonary.      PAST MEDICAL HISTORY:  Past Medical History:   Diagnosis Date    Atrial fibrillation (H)     paroxysmal    Basal cell carcinoma     COPD (chronic obstructive pulmonary disease) (H) 08/06/2019    FEV1 53 %    Coronary artery disease involving native coronary artery of native heart  11/30/16    NSTEMI (peak troponin 0.05); coronary angiogram showed modeerate nonocclusive disease: LM 30-40 %, LAD 30-40%, CX 30-40 %    Diverticulosis of colon 4/16/12    sigmoid diverticulosis per CT scan; episode acute diverticulitis per CT scan    Heart valve insufficiency     Lipoma of other skin and subcutaneous tissue     Lumbar spinal stenosis 9/1/10    lumbar spinal stenosis at L5    NSTEMI (non-ST elevated myocardial infarction) (H) 11/30/16    NSTEMI (peak troponin 0.05); no acute lesions on angiogram    Obesity (BMI 30-39.9) 7/9/13    BMI 38    Obstructive sleep apnea 10/7/14    per sleep study:  AHI 25/hr, desats to 84%. Supine /hr, only 6 minutes spent supine    Other and unspecified hyperlipidemia     Prediabetes 7/9/13    elevated fasting glucoses    Shingles 11/2017    dx when he was in [place]    Tubular adenoma of colon 8/13/13    3 adenomatous poltyps removed, 4 hyperplastic polyps removed    Unspecified essential hypertension        CURRENT MEDICATIONS:  Current Outpatient Medications   Medication Sig Dispense Refill    albuterol (PROAIR HFA/PROVENTIL HFA/VENTOLIN HFA) 108 (90 Base) MCG/ACT inhaler Inhale 2 puffs into the lungs every 4 hours as needed for shortness of breath or wheezing 18 g 3    amLODIPine (NORVASC) 5 MG tablet Take 1 tablet (5 mg) by mouth daily 90 tablet 3    aspirin 81 MG tablet Take 1 tablet by mouth daily.      furosemide (LASIX) 20 MG tablet Take 2 tablets (40 mg) by mouth every morning 180 tablet 3    lisinopril (ZESTRIL) 40 MG tablet Take 1 tablet (40 mg) by mouth daily 90 tablet 3    metoprolol succinate ER (TOPROL XL) 50 MG 24 hr tablet Take 1.5 tablets (75 mg) by mouth daily 135 tablet 3    rosuvastatin (CRESTOR) 40 MG tablet Take 1 tablet (40 mg) by mouth daily 90 tablet 3    fluorouracil (EFUDEX) 5 % external cream  (Patient not taking: Reported on 7/18/2024)      fluticasone-salmeterol (AIRDUO RESPICLICK) 113-14 MCG/ACT inhaler Inhale 1 puff into the lungs  2 times daily Use twice daily in the main seasons for breathing problems (Patient not taking: Reported on 2023) 1 each 3       PAST SURGICAL HISTORY:  Past Surgical History:   Procedure Laterality Date    COLONOSCOPY  13    3 adenomatous polyps and 4 hyperplastic polyps removed    HC TOOTH EXTRACTION W/FORCEP      4 wisdom teeth removed withou difficulty       ALLERGIES  No known allergies    FAMILY HX:  Family History   Problem Relation Age of Onset    Hypertension Mother         B:192    Colon Cancer Mother 84    Kidney Cancer Mother 95         age 95    Heart Disease Father         D: 70's  MI    Hypertension Brother         lipids as well    Heart Disease Brother         CABG x 3, mitral valve reaplcement    Colon Cancer Brother 68    Respiratory Brother         PRACHI    Myocardial Infarction Brother 69        sudden cardiac death    Diabetes No family hx of     Cerebrovascular Disease No family hx of        SOCIAL HX:  Social History     Socioeconomic History    Marital status:    Tobacco Use    Smoking status: Former     Current packs/day: 0.00     Average packs/day: 0.8 packs/day for 20.0 years (15.0 ttl pk-yrs)     Types: Cigarettes     Start date: 1986     Quit date: 2006     Years since quittin.2    Smokeless tobacco: Never    Tobacco comments:     had quit for 1 year, smoked on and off for years, quit again    Substance and Sexual Activity    Alcohol use: Yes     Alcohol/week: 4.0 standard drinks of alcohol     Types: 4 Glasses of wine per week     Comment: 2 glasses of wine daily     Drug use: No    Sexual activity: Not Currently   Other Topics Concern    Special Diet No    Exercise No     Social Determinants of Health     Financial Resource Strain: Low Risk  (2023)    Financial Resource Strain     Within the past 12 months, have you or your family members you live with been unable to get utilities (heat, electricity) when it was really needed?: No   Food  "Insecurity: Low Risk  (9/28/2023)    Food Insecurity     Within the past 12 months, did you worry that your food would run out before you got money to buy more?: No     Within the past 12 months, did the food you bought just not last and you didn t have money to get more?: No   Transportation Needs: Low Risk  (9/28/2023)    Transportation Needs     Within the past 12 months, has lack of transportation kept you from medical appointments, getting your medicines, non-medical meetings or appointments, work, or from getting things that you need?: No   Housing Stability: Low Risk  (9/28/2023)    Housing Stability     Do you have housing? : Yes     Are you worried about losing your housing?: No       ROS:  Constitutional: No fever, chills, or sweats. No weight gain/loss.   ENT: No visual disturbance, ear ache, epistaxis, sore throat.   Allergies/Immunologic: Negative.   Respiratory: No cough, hemoptysis.   Cardiovascular: As per HPI.   GI: No nausea, vomiting, hematemesis, melena, or hematochezia.   : No urinary frequency, dysuria, or hematuria.   Integument: Negative.   Psychiatric: Negative.   Neuro: Negative.   Endocrinology: Negative.   Musculoskeletal: No myalgia.    VITAL SIGNS:  /79   Pulse 93   Ht 1.753 m (5' 9\")   Wt 123 kg (271 lb 3.2 oz)   SpO2 94%   BMI 40.05 kg/m    Body mass index is 40.05 kg/m .  Wt Readings from Last 2 Encounters:   07/18/24 123 kg (271 lb 3.2 oz)   09/29/23 123.2 kg (271 lb 9.6 oz)       PHYSICAL EXAM  Giacomo LANIER [place] IS A 74 year old male.in no acute distress.  HEENT: Unremarkable.  Neck: JVP normal.   Lungs: CTA.  Cor: RRR. Normal S1 and S2.  Mid to late peaking systolic murmur.  Abd: Soft, nontender, nondistended.   Extremities: No C/C/E.  Neuro: Grossly intact.    LABS    Lab Results   Component Value Date    WBC 5.7 07/25/2023    WBC 5.7 06/27/2021     Lab Results   Component Value Date    RBC 5.70 07/25/2023    RBC 5.63 06/27/2021     Lab Results   Component Value Date " "   HGB 16.0 07/25/2023    HGB 16.0 06/27/2021     Lab Results   Component Value Date    HCT 49.0 07/25/2023    HCT 48.8 06/27/2021     No components found for: \"MCT\"  Lab Results   Component Value Date    MCV 86 07/25/2023    MCV 87 06/27/2021     Lab Results   Component Value Date    MCH 28.1 07/25/2023    MCH 28.4 06/27/2021     Lab Results   Component Value Date    MCHC 32.7 07/25/2023    MCHC 32.8 06/27/2021     Lab Results   Component Value Date    RDW 13.8 07/25/2023    RDW 14.6 06/27/2021     Lab Results   Component Value Date     07/25/2023     06/27/2021      Recent Labs   Lab Test 07/25/23  1103 09/20/22  1353    139   POTASSIUM 4.1 3.8   CHLORIDE 104 105   CO2 26 26   ANIONGAP 10 8   * 132*   BUN 13.5 16   CR 0.89 0.81   LASHAY 8.8 9.2     Recent Labs   Lab Test 07/25/23  1103 09/20/22  1353   CHOL 142 151   HDL 36* 39*   LDL 80 81   TRIG 130 157*   NHDL 106 112        IMPRESSION, REPORT, PLAN:   1.  Moderate coronary artery disease, managed medically with last cath 12/2016, currently asymptomatic.   2.  Hypertension, reasonably controlled.   3.  History of GI bleed on Xarelto, currently not anticoagulated as a result.   4.  Paroxysmal atrial fibrillation.   5.  Hyperlipidemia, well controlled.   6.  BMI 40  7.  Moderate bicuspid aortic valve stenosis with a mean gradient of 27- 33 mmHg.     8.  Ascending aortic dilatation at 4 cm.   9.  Obstructive sleep apnea using CPAP.   10.  SOB with concerns for COPD     DISCUSSION:  It was a pleasure seeing Mr. Solis in followup.  He is doing well from a cardiovascular standpoint and does not have any concerning cardiac symptoms.  He does have SOB and is interested in seeing pulmonary medicine as his primary told him he has COPD but he does not feel he has improved with any interventions.      Discussed doing a stress test to evaluate for ischemia as well -- a nuclear stress test was ordered today.  His gradient across his valve is " stable.  His murmur is a little more consistent with moderate to severe aortic stenosis so will plan to do a calcium evaluation of his valve with a CT next year and will also assess his aortopathy at that time as well.      He will continue to work on diet and exercise.  Looking forward to returning to [place] in October. Continues to use his camper regularly.      He will let us know if he has any concerning symptoms before then.     It was a pleasure to see him today.  Please do not hesitate to contact me with any questions or concerns.      SADI THIBODEAUX MD    40 minutes face-face, documentation and review of records on day of visit

## 2024-07-19 ENCOUNTER — TELEPHONE (OUTPATIENT)
Dept: CARDIOLOGY | Facility: CLINIC | Age: 74
End: 2024-07-19
Payer: COMMERCIAL

## 2024-07-19 NOTE — TELEPHONE ENCOUNTER
Spoke with pt in regard to getting him rescheduled for his Lexiscan. He stated that is was not a good time to schedule and ask if he could call me back later. I agreed and he wanted me to send him a message with our contact information. I informed him that I would send him a [employer] message.

## 2024-07-19 NOTE — LETTER
July 26, 2024      TO: Giacomo LANIER [place]  [address] [place] [id number]         Dear Giacomo,    Our records indicate that it is time for you to schedule testing that Dr. Du would like you to have. She would like for you to have a Lexiscan.     To make your appointment, please call: [phone], Monday - Friday, 8 A.M. - 4:30 P.M (Central Time Zone).    Please check with your insurance company about your benefits.  Some insurance plans provide different coverage levels for services at [facility] hospital-based clinics.     We look forward to hearing from you.    Sincerely,    Kim Clayton [facility]  [facility]  Office: [phone]  Fax: [phone]

## 2024-07-26 NOTE — TELEPHONE ENCOUNTER
Attempted to reach pt to scheduled appt, no answer, LVM     Sent letter. Max attempts, if patient wishes to still be seen, please call [phone]

## 2024-09-08 DIAGNOSIS — E66.01 SEVERE OBESITY (BMI 35.0-35.9 WITH COMORBIDITY) (H): ICD-10-CM

## 2024-09-08 DIAGNOSIS — R73.03 PREDIABETES: ICD-10-CM

## 2024-09-08 DIAGNOSIS — I48.0 PAROXYSMAL ATRIAL FIBRILLATION (H): ICD-10-CM

## 2024-09-08 DIAGNOSIS — I10 BENIGN ESSENTIAL HYPERTENSION: ICD-10-CM

## 2024-09-08 DIAGNOSIS — E11.59 TYPE 2 DIABETES MELLITUS WITH OTHER CIRCULATORY COMPLICATION, WITHOUT LONG-TERM CURRENT USE OF INSULIN (H): ICD-10-CM

## 2024-09-08 DIAGNOSIS — Z12.5 SCREENING FOR PROSTATE CANCER: ICD-10-CM

## 2024-09-08 DIAGNOSIS — E66.01 SEVERE OBESITY (BMI 35.0-39.9) WITH COMORBIDITY (H): Primary | ICD-10-CM

## 2024-09-10 RX ORDER — LISINOPRIL 40 MG/1
40 TABLET ORAL DAILY
Qty: 90 TABLET | Refills: 0 | Status: SHIPPED | OUTPATIENT
Start: 2024-09-10

## 2024-09-25 ENCOUNTER — TRANSFERRED RECORDS (OUTPATIENT)
Dept: HEALTH INFORMATION MANAGEMENT | Facility: CLINIC | Age: 74
End: 2024-09-25

## 2024-10-14 ENCOUNTER — TRANSFERRED RECORDS (OUTPATIENT)
Dept: MULTI SPECIALTY CLINIC | Facility: CLINIC | Age: 74
End: 2024-10-14
Payer: MEDICARE

## 2024-10-14 LAB — RETINOPATHY: NORMAL

## 2024-10-19 ENCOUNTER — HEALTH MAINTENANCE LETTER (OUTPATIENT)
Age: 74
End: 2024-10-19

## 2024-10-23 ENCOUNTER — OFFICE VISIT (OUTPATIENT)
Dept: INTERNAL MEDICINE | Facility: CLINIC | Age: 74
End: 2024-10-23
Payer: MEDICARE

## 2024-10-23 VITALS
WEIGHT: 262.1 LBS | HEIGHT: 68 IN | DIASTOLIC BLOOD PRESSURE: 70 MMHG | OXYGEN SATURATION: 97 % | HEART RATE: 95 BPM | TEMPERATURE: 98 F | RESPIRATION RATE: 17 BRPM | SYSTOLIC BLOOD PRESSURE: 110 MMHG | BODY MASS INDEX: 39.72 KG/M2

## 2024-10-23 DIAGNOSIS — J44.9 CHRONIC OBSTRUCTIVE PULMONARY DISEASE, UNSPECIFIED COPD TYPE (H): ICD-10-CM

## 2024-10-23 DIAGNOSIS — E78.5 HYPERLIPIDEMIA LDL GOAL <70: ICD-10-CM

## 2024-10-23 DIAGNOSIS — R60.0 BILATERAL LOWER EXTREMITY EDEMA: ICD-10-CM

## 2024-10-23 DIAGNOSIS — E11.59 TYPE 2 DIABETES MELLITUS WITH OTHER CIRCULATORY COMPLICATION, WITHOUT LONG-TERM CURRENT USE OF INSULIN (H): ICD-10-CM

## 2024-10-23 DIAGNOSIS — G47.33 OBSTRUCTIVE SLEEP APNEA ON CPAP: ICD-10-CM

## 2024-10-23 DIAGNOSIS — I10 BENIGN ESSENTIAL HYPERTENSION: ICD-10-CM

## 2024-10-23 DIAGNOSIS — I48.0 PAROXYSMAL ATRIAL FIBRILLATION (H): ICD-10-CM

## 2024-10-23 DIAGNOSIS — I25.10 CORONARY ARTERY DISEASE INVOLVING NATIVE CORONARY ARTERY OF NATIVE HEART WITHOUT ANGINA PECTORIS: ICD-10-CM

## 2024-10-23 DIAGNOSIS — K51.40 INFLAMMATORY PSEUDOTUMOR OF COLON (H): ICD-10-CM

## 2024-10-23 DIAGNOSIS — E66.01 MORBID OBESITY WITH BMI OF 40.0-44.9, ADULT (H): ICD-10-CM

## 2024-10-23 DIAGNOSIS — I21.4 NSTEMI (NON-ST ELEVATED MYOCARDIAL INFARCTION) (H): ICD-10-CM

## 2024-10-23 DIAGNOSIS — I77.810 ASCENDING AORTA DILATATION (H): ICD-10-CM

## 2024-10-23 DIAGNOSIS — Z00.00 MEDICARE ANNUAL WELLNESS VISIT, SUBSEQUENT: Primary | ICD-10-CM

## 2024-10-23 PROBLEM — D12.3 BENIGN NEOPLASM OF TRANSVERSE COLON: Status: ACTIVE | Noted: 2022-10-06

## 2024-10-23 PROBLEM — K64.9 HEMORRHOIDS: Status: ACTIVE | Noted: 2022-10-04

## 2024-10-23 PROBLEM — D12.2 BENIGN NEOPLASM OF ASCENDING COLON: Status: ACTIVE | Noted: 2022-10-06

## 2024-10-23 PROBLEM — Z86.0100 HISTORY OF COLONIC POLYPS: Status: ACTIVE | Noted: 2017-08-02

## 2024-10-23 LAB
ANION GAP SERPL CALCULATED.3IONS-SCNC: 12 MMOL/L (ref 7–15)
BUN SERPL-MCNC: 12.6 MG/DL (ref 8–23)
CALCIUM SERPL-MCNC: 9.3 MG/DL (ref 8.8–10.4)
CHLORIDE SERPL-SCNC: 101 MMOL/L (ref 98–107)
CREAT SERPL-MCNC: 0.9 MG/DL (ref 0.67–1.17)
EGFRCR SERPLBLD CKD-EPI 2021: >90 ML/MIN/1.73M2
EST. AVERAGE GLUCOSE BLD GHB EST-MCNC: 126 MG/DL
GLUCOSE SERPL-MCNC: 110 MG/DL (ref 70–99)
HBA1C MFR BLD: 6 % (ref 0–5.6)
HCO3 SERPL-SCNC: 27 MMOL/L (ref 22–29)
HGB BLD-MCNC: 16.1 G/DL (ref 13.3–17.7)
POTASSIUM SERPL-SCNC: 4.3 MMOL/L (ref 3.4–5.3)
SODIUM SERPL-SCNC: 140 MMOL/L (ref 135–145)

## 2024-10-23 PROCEDURE — 99214 OFFICE O/P EST MOD 30 MIN: CPT | Mod: 25 | Performed by: INTERNAL MEDICINE

## 2024-10-23 PROCEDURE — 36415 COLL VENOUS BLD VENIPUNCTURE: CPT | Performed by: INTERNAL MEDICINE

## 2024-10-23 PROCEDURE — 80048 BASIC METABOLIC PNL TOTAL CA: CPT | Performed by: INTERNAL MEDICINE

## 2024-10-23 PROCEDURE — 85018 HEMOGLOBIN: CPT | Performed by: INTERNAL MEDICINE

## 2024-10-23 PROCEDURE — 83036 HEMOGLOBIN GLYCOSYLATED A1C: CPT | Performed by: INTERNAL MEDICINE

## 2024-10-23 PROCEDURE — G0439 PPPS, SUBSEQ VISIT: HCPCS | Performed by: INTERNAL MEDICINE

## 2024-10-23 RX ORDER — FUROSEMIDE 20 MG/1
40 TABLET ORAL EVERY MORNING
Qty: 180 TABLET | Refills: 1 | Status: SHIPPED | OUTPATIENT
Start: 2024-10-23

## 2024-10-23 RX ORDER — LISINOPRIL 40 MG/1
40 TABLET ORAL DAILY
Qty: 90 TABLET | Refills: 1 | Status: SHIPPED | OUTPATIENT
Start: 2024-10-23

## 2024-10-23 RX ORDER — ROSUVASTATIN CALCIUM 40 MG/1
40 TABLET, COATED ORAL DAILY
Qty: 90 TABLET | Refills: 3 | Status: SHIPPED | OUTPATIENT
Start: 2024-10-23

## 2024-10-23 RX ORDER — METOPROLOL SUCCINATE 50 MG/1
75 TABLET, EXTENDED RELEASE ORAL DAILY
Qty: 135 TABLET | Refills: 3 | Status: SHIPPED | OUTPATIENT
Start: 2024-10-23

## 2024-10-23 RX ORDER — FLUTICASONE PROPIONATE AND SALMETEROL 113; 14 UG/1; UG/1
1 POWDER, METERED RESPIRATORY (INHALATION) 2 TIMES DAILY
Qty: 3 EACH | Refills: 3 | Status: SHIPPED | OUTPATIENT
Start: 2024-10-23

## 2024-10-23 RX ORDER — MOLNUPIRAVIR 200 MG/1
800 CAPSULE ORAL EVERY 12 HOURS
COMMUNITY
Start: 2024-02-21 | End: 2024-02-26

## 2024-10-23 RX ORDER — TIRZEPATIDE 2.5 MG/.5ML
2.5 INJECTION, SOLUTION SUBCUTANEOUS
Qty: 2 ML | Refills: 0 | Status: SHIPPED | OUTPATIENT
Start: 2024-10-23 | End: 2024-11-20

## 2024-10-23 RX ORDER — FLUTICASONE PROPIONATE AND SALMETEROL 113; 14 UG/1; UG/1
1 POWDER, METERED RESPIRATORY (INHALATION) 2 TIMES DAILY
Qty: 1 EACH | Refills: 3 | Status: CANCELLED | OUTPATIENT
Start: 2024-10-23

## 2024-10-23 RX ORDER — AMLODIPINE BESYLATE 5 MG/1
5 TABLET ORAL DAILY
Qty: 90 TABLET | Refills: 1 | Status: SHIPPED | OUTPATIENT
Start: 2024-10-23

## 2024-10-23 RX ORDER — ALBUTEROL SULFATE 90 UG/1
2 INHALANT RESPIRATORY (INHALATION) EVERY 4 HOURS PRN
Qty: 18 G | Refills: 11 | Status: SHIPPED | OUTPATIENT
Start: 2024-10-23

## 2024-10-23 SDOH — HEALTH STABILITY: PHYSICAL HEALTH: ON AVERAGE, HOW MANY MINUTES DO YOU ENGAGE IN EXERCISE AT THIS LEVEL?: 0 MIN

## 2024-10-23 SDOH — HEALTH STABILITY: PHYSICAL HEALTH: ON AVERAGE, HOW MANY DAYS PER WEEK DO YOU ENGAGE IN MODERATE TO STRENUOUS EXERCISE (LIKE A BRISK WALK)?: 0 DAYS

## 2024-10-23 ASSESSMENT — SOCIAL DETERMINANTS OF HEALTH (SDOH)
HOW OFTEN DO YOU GET TOGETHER WITH FRIENDS OR RELATIVES?: ONCE A WEEK
HOW OFTEN DO YOU GET TOGETHER WITH FRIENDS OR RELATIVES?: ONCE A WEEK

## 2024-10-23 ASSESSMENT — PAIN SCALES - GENERAL: PAINLEVEL_OUTOF10: NO PAIN (0)

## 2024-10-23 NOTE — PROGRESS NOTES
Preventive Care Visit  [facility]  Sinan Stephens MD, Internal Medicine  Oct 23, 2024      Assessment & Plan     (Z00.00) Medicare annual wellness visit, subsequent  (primary encounter diagnosis)  Comment: Discussed cardiac disease risk factors and cardiac disease risk factor modification, including diabetes screening, blood pressure screening (and management if indicated), and cholesterol screening.   Reviewed immunzation guidelines, including pneumococcal vaccines, annual influenza, and shingles vaccines.   Discussed routine cancer screenings, including skin cancer, colon cancer screening for everyone until age 80, prostate cancer screening in men until age 75, mammogram and PAP/pelvic for women until age 75.   Recommended regular dentist visits to care for remaining teeth.   Recommended regular screening for vision and glaucoma.   Recommended safe driving and accident avoidance.    Counseling:    Reviewed preventive health counseling, as reflected in patient instructions       Regular exercise       Healthy diet/nutrition       Vision screening       Hearing screening       Immunizations  Flu & covid up to date   Recommend he investigate shingix shingles vaccine and get from pharmacy due to medicare         Colorectal cancer screening       Prostate cancer screening         Patient has been advised of split billing requirements and indicates understanding: Yes   Plan:     (I10) Benign essential hypertension  Comment: This condition is currently controlled on the current medical regimen.  Continue current therapy.   Plan: BASIC METABOLIC PANEL, amLODIPine (NORVASC) 5         MG tablet, furosemide (LASIX) 20 MG tablet,         lisinopril (ZESTRIL) 40 MG tablet, metoprolol         succinate ER (TOPROL XL) 50 MG 24 hr tablet            (I25.10) Coronary artery disease involving native coronary artery of native heart without angina pectoris  Comment: The patient does not report any signs  or symptoms of angina or active cardiac ischemia.   They do not report any relative changes in their ability to perform physical activities over the past year.    We discussed aggressive secondary risk factor modification, including aggressive BP control (under 130/ideally), aggressive LDL lowering with statin (goal under 100 for sure/under 70 ideally), no smoking, diabetes prevention/management, no smoking, and use of either ASA or similar anti platelet agent if tolerated.     Plan: BASIC METABOLIC PANEL, Hemoglobin, metoprolol         succinate ER (TOPROL XL) 50 MG 24 hr tablet,         rosuvastatin (CRESTOR) 40 MG tablet, MOUNJARO         2.5 MG/0.5ML SOAJ, Tirzepatide 5 MG/0.5ML SOAJ            (J44.9) Chronic obstructive pulmonary disease, unspecified COPD type (H)  Comment: This condition is currently controlled on the current medical regimen.  Continue current therapy.   Discussed general issues of COPD, including pathophysiology, ways it will affect the pt., when to seek help, reviewed the typical medications (how they are taken, how they help)   Plan: albuterol (PROAIR HFA/PROVENTIL HFA/VENTOLIN         HFA) 108 (90 Base) MCG/ACT inhaler,         fluticasone-salmeterol (AIRDUO RESPICLICK)         113-14 MCG/ACT inhaler            (R60.0) Bilateral lower extremity edema  Comment: This condition is currently controlled on the current medical regimen.  Continue current therapy.   Plan: furosemide (LASIX) 20 MG tablet            (I21.4) NSTEMI (non-ST elevated myocardial infarction) (H)  Comment: This condition is currently controlled on the current medical regimen.  Continue current therapy.   Discussed secondary risk factor modification and recommended continuing aggressive management of these items.   Plan: metoprolol succinate ER (TOPROL XL) 50 MG 24 hr        tablet, rosuvastatin (CRESTOR) 40 MG tablet            (E78.5) Hyperlipidemia LDL goal <70  Comment: This condition is currently controlled on the  current medical regimen.  Continue current therapy.  Discussed guidelines recommending a statin cholesterol lowering medication for any patient with either diabetes and/or vascular disease, aiming for a LDL goal under 100 for sure, ideally under 70, using whatever dose of statin tolerated.    Plan: rosuvastatin (CRESTOR) 40 MG tablet            (K51.40) Inflammatory pseudotumor of colon (H)  Comment: Known issue that I take into account for their medical decisions, managed by specialist.    Continue current therapy as directed by their specialist(s).   Plan:     (E11.59) Type 2 diabetes mellitus with other circulatory complication, without long-term current use of insulin (H)  Comment:       Start GLP-1 medication to help control diabetes, and/or reduce weight, and also reduce risk of future cardiac events)     Start Tirzepatide (Mounjaro) 2.5 mg once per week for 4 weeks, then increase to 5 mg once per week.      Main side effects include ( but are not limited to) nausea, vomiting, stomach upset, and very rarely pancreatitis.  Stop the medication and contact us if you develop any concerning possible side effects    One potential main obstacle with these medications can be expense.  Insurance coverage can vary and these medication are extremely expensive if not covered.  Your employer will determine the criteria for coverage of these medications.       Follow up via e-visit in 2-3 months to recheck the status of this medication.       Plan: HEMOGLOBIN A1C, BASIC METABOLIC PANEL,         Hemoglobin, lisinopril (ZESTRIL) 40 MG tablet,         rosuvastatin (CRESTOR) 40 MG tablet, MOUNJARO         2.5 MG/0.5ML SOAJ, Tirzepatide 5 MG/0.5ML SOAJ            (I77.810) Ascending aorta dilatation (H)  Comment: This condition is currently controlled on the current medical regimen.  Continue current therapy.   Discussed secondary risk factor modification and recommended continuing aggressive management of these items.   Plan:      (I48.0) Paroxysmal atrial fibrillation (H)  Comment:      I strognly recommend that you take an anticoagulation medication to reduce the significant risk of stroke fro, atrial fibrillation.      --STart Xarelto (Rivaroxaban) 20 mg once per day.  If this is not covered by your insura,kady mcfarlane we may need to consider Warfarin.      --Discuss this with your cardiologist for further recommendations.   Plan: Hemoglobin, rivaroxaban ANTICOAGULANT (XARELTO)        20 MG TABS tablet            (G47.33) Obstructive sleep apnea on CPAP  Comment: This condition is currently controlled on the current medical regimen.  Continue current therapy.   Plan:     (E66.01,  Z68.41) Morbid obesity with BMI of 40.0-44.9, adult (H)  Comment: Obesity is contributing to HTN, obstructive sleep apnea, DM II.  Current BMI is: Body mass index is 40.44 kg/m ..  Reviewed weight patterns.   Obesity as a biological preventable and treatable disease, which is associated with significantly increased risk of many acute and chronic health conditions.   Obesity has now been recognized as a chronic disease by the American Medical Association.  Obesity has serious co-morbidities associated with obesity including increased risk for hypertension, stroke, coronary artery disease, dyslipidemia, Type II diabetes, depression, sleep apnea, cancers of the colon, breast and endometrium, obstructive sleep apnea, osteoarthritis and female infertility.  Recommended regular aerobic exercise, recommended improved diet aiming at lowering amount of processed foods, lower sugars, moderation/reduction of simple carbs and fat in the diet, establishing more regular meal times, always eating breakfast, front loading some of the calories and adding more protein to the diet.     Referral to Weight Loss Clinic for discussion of more aggressive measures for weight loss can be considered (including medical options (e.g. GLP-1, or appetite suppressants, etc.) or bariatric surgical  "procedures.   Plan: MOUNJARO 2.5 MG/0.5ML SOAJ, Tirzepatide 5         MG/0.5ML SOAJ               Patient has been advised of split billing requirements and indicates understanding: Yes        BMI  Estimated body mass index is 40.44 kg/m  as calculated from the following:    Height as of this encounter: 1.715 m (5' 7.5\").    Weight as of this encounter: 118.9 kg (262 lb 1.6 oz).       Counseling  Appropriate preventive services were addressed with this patient via screening, questionnaire, or discussion as appropriate for fall prevention, nutrition, physical activity, Tobacco-use cessation, social engagement, weight loss and cognition.  Checklist reviewing preventive services available has been given to the patient.          Anh Shields is a 74 year old, presenting for the following:  Medicare Visit        10/23/2024     2:33 PM   Additional Questions   Roomed by Ania WALL CMA     Via the Health Maintenance questionnaire, the patient has reported the following services have been completed , this information has been sent to the abstraction team.    HPI      1.)  Hypertension:  History of hypertension, on medication.  No reported side effects from medications.    Reviewed last 6 BP readings in chart:  BP Readings from Last 6 Encounters:   10/23/24 110/70   07/18/24 116/79   09/29/23 120/76   09/13/23 120/83   08/07/23 108/69   08/01/23 126/84     No active cardiac complaints or symptoms with exercise.     .)  The patient has had a history of obesity.  Reviewed the weigth curves.   Their current BMI is:  Body mass index is 40.44 kg/m .    Discussed current eating and exercise habits.     Reviewed weights in chart:  Wt Readings from Last 10 Encounters:   10/23/24 118.9 kg (262 lb 1.6 oz)   07/18/24 123 kg (271 lb 3.2 oz)   09/29/23 123.2 kg (271 lb 9.6 oz)   09/13/23 123.4 kg (272 lb)   08/07/23 123.8 kg (273 lb)   08/01/23 122.9 kg (270 lb 14.4 oz)   07/13/23 123.7 kg (272 lb 12.8 oz)   09/20/22 120.2 kg (265 " lb)   06/09/22 121.7 kg (268 lb 6.4 oz)   05/20/22 121.6 kg (268 lb)        3) Prior of coronary artery disease as listed in medical history.  No current or recent cardiovascaulr symptoms.   No shortness of breath, no episodes of chest pain/pressure, no dyspnea on exertion, no changes in his abiliy to perform physical exertion or tasks.  Takes the same amount of time to perform similar physical tasks.         4.) COPD remains stable.  Has not had any recent breathing troubles beyond usual baseline.  Has not any acute respiratory events.  Remains with intermittent cough, mild shortness of breath with overexertion as per usual.  Using medication as directed with reported side effects. D    5.) Has history of atrial fibrillation.    No recent reports of significant palpitations, dizziness, presyncope, dyspnea on exertion, or difficulties with exertion.  .   Taking medications as ordered, no side effects reported.   Patient is NOT taking anticoagulation.          Health Care Directive  Patient does not have a Health Care Directive: Discussed advance care planning with patient; however, patient declined at this time.      10/23/2024   General Health   How would you rate your overall physical health? (!) FAIR   Feel stress (tense, anxious, or unable to sleep) Not at all            10/23/2024   Nutrition   Diet: Regular (no restrictions)            10/23/2024   Exercise   Days per week of moderate/strenous exercise 0 days   Average minutes spent exercising at this level 0 min      (!) EXERCISE CONCERN      10/23/2024   Social Factors   Frequency of gathering with friends or relatives Once a week   Worry food won't last until get money to buy more No   Food not last or not have enough money for food? No   Do you have housing? (Housing is defined as stable permanent housing and does not include staying ouside in a car, in a tent, in an abandoned building, in an overnight shelter, or couch-surfing.) Yes   Are you worried  about losing your housing? No   Lack of transportation? No   Unable to get utilities (heat,electricity)? No            10/23/2024   Fall Risk   Fallen 2 or more times in the past year? No     No     No    Trouble with walking or balance? No     No     No        Patient-reported    Multiple values from one day are sorted in reverse-chronological order          10/23/2024   Activities of Daily Living- Home Safety   Needs help with the following daily activites None of the above   Safety concerns in the home None of the above            10/23/2024   Dental   Dentist two times every year? (!) NO            10/23/2024   Hearing Screening   Hearing concerns? (!) I FEEL THAT PEOPLE ARE MUMBLING OR NOT SPEAKING CLEARLY.    (!) IT'S HARD TO FOLLOW A CONVERSATION IN A NOISY RESTAURANT OR CROWDED ROOM.    (!) TROUBLE UNDERSTANDING SOFT OR WHISPERED SPEECH.       Multiple values from one day are sorted in reverse-chronological order         10/23/2024   Driving Risk Screening   Patient/family members have concerns about driving No            10/23/2024   General Alertness/Fatigue Screening   Have you been more tired than usual lately? No            10/23/2024   Urinary Incontinence Screening   Bothered by leaking urine in past 6 months No            10/23/2024   TB Screening   Were you born outside of the [place]? Yes            Today's PHQ-2 Score:       10/22/2024     6:30 PM   PHQ-2 ( 1999 [employer])   Q1: Little interest or pleasure in doing things 0    Q2: Feeling down, depressed or hopeless 0    PHQ-2 Score 0   Q1: Little interest or pleasure in doing things Not at all   Q2: Feeling down, depressed or hopeless Not at all   PHQ-2 Score 0       Patient-reported           10/23/2024   Substance Use   Alcohol more than 3/day or more than 7/wk Yes   How often do you have a drink containing alcohol 4 or more times a week   How many alcohol drinks on typical day 3 or 4   How often do you have 5+ drinks at one occasion Monthly   Audit  2/3 Score 3   How often not able to stop drinking once started Never   How often failed to do what normally expected Never   How often needed first drink in am after a heavy drinking session Never   How often feeling of guilt or remorse after drinking Never   How often unable to remember what happened the night before Never   Have you or someone else been injured because of your drinking No   Has anyone been concerned or suggested you cut down on drinking No   TOTAL SCORE - AUDIT 7   Do you have a current opioid prescription? No   How severe/bad is pain from 1 to 10? 8/10   Do you use any other substances recreationally? No        Social History     Tobacco Use    Smoking status: Former     Current packs/day: 0.00     Average packs/day: 0.8 packs/day for 20.0 years (15.0 ttl pk-yrs)     Types: Cigarettes     Start date: 1986     Quit date: 2006     Years since quittin.4    Smokeless tobacco: Never    Tobacco comments:     had quit for 1 year, smoked on and off for years, quit again    Substance Use Topics    Alcohol use: Yes     Alcohol/week: 4.0 standard drinks of alcohol     Types: 4 Glasses of wine per week     Comment: 2-3 glasses of wine    Drug use: No       ASCVD Risk   The ASCVD Risk score (Vernon MADSEN, et al., 2019) failed to calculate for the following reasons:    Risk score cannot be calculated because patient has a medical history suggesting prior/existing ASCVD            Reviewed and updated as needed this visit by Provider        Surg Hx               **I reviewed the information recorded in the patient's EPIC chart (including but not limited to medical history, surgical history, family history, problem list, medication list, and allergy list) and updated the information as indicated based on the patients reported information.         Current providers sharing in care for this patient include:  Patient Care Team:  Sinan Stephens MD as PCP - General Stephens  "Sinan Pascual MD as Assigned PCP  Duc Du MD as Assigned Heart and Vascular Provider  Vaughn Taylor MD as Assigned Sleep Provider  Tyler Lobo DO as Assigned Musculoskeletal Provider    The following health maintenance items are reviewed in Epic and correct as of today:  Health Maintenance   Topic Date Due    MICROALBUMIN  Never done    DIABETIC FOOT EXAM  Never done    COPD ACTION PLAN  Never done    ZOSTER IMMUNIZATION (1 of 2) Never done    RSV VACCINE (1 - Risk 60-74 years 1-dose series) Never done    LIPID  07/25/2024    A1C  04/23/2025    BMP  04/23/2025    ANNUAL REVIEW OF HM ORDERS  09/10/2025    EYE EXAM  10/14/2025    MEDICARE ANNUAL WELLNESS VISIT  10/23/2025    FALL RISK ASSESSMENT  10/23/2025    COLORECTAL CANCER SCREENING  05/09/2026    DTAP/TDAP/TD IMMUNIZATION (2 - Td or Tdap) 09/18/2027    ADVANCE CARE PLANNING  10/23/2029    SPIROMETRY  Completed    HEPATITIS C SCREENING  Completed    PHQ-2 (once per calendar year)  Completed    INFLUENZA VACCINE  Completed    Pneumococcal Vaccine: 65+ Years  Completed    AORTIC ANEURYSM SCREENING (SYSTEM ASSIGNED)  Completed    COVID-19 Vaccine  Completed    HPV IMMUNIZATION  Aged Out    MENINGITIS IMMUNIZATION  Aged Out    RSV MONOCLONAL ANTIBODY  Aged Out         Review of Systems  Constitutional, neuro, ENT, endocrine, pulmonary, cardiac, gastrointestinal, genitourinary, musculoskeletal, integument and psychiatric systems are negative, except as otherwise noted.     Objective    Exam  /70   Pulse 95   Temp 98  F (36.7  C) (Oral)   Resp 17   Ht 1.715 m (5' 7.5\")   Wt 118.9 kg (262 lb 1.6 oz)   SpO2 97%   BMI 40.44 kg/m     Estimated body mass index is 40.44 kg/m  as calculated from the following:    Height as of this encounter: 1.715 m (5' 7.5\").    Weight as of this encounter: 118.9 kg (262 lb 1.6 oz).    Physical Exam  GENERAL alert and no distress  EYES:  Normal sclera,conjunctiva, EOMI  HENT: oral and posterior pharynx " without lesions or erythema, facies symmetric  NECK: Neck supple. No LAD, without thyroidmegaly.  RESP: breath sounds quiet but clear, diminished respiratory excursion, prolonged expiratory phase,  no rhonci, no wheezes, no rales   CV: RRR normal S1S2 without murmurs, rubs or gallops.  LYMPH: no cervical lymph adenopathy appreciated  MS: extremities- no gross deformities of the visible extremities noted,   EXT:  no lower extremity edema  PSYCH: Alert and oriented times 3; speech- coherent  SKIN:  No obvious significant skin lesions on visible portions of face         10/23/2024   Mini Cog   Clock Draw Score 2 Normal   3 Item Recall 3 objects recalled   Mini Cog Total Score 5                 Signed Electronically by: Sinan Stephens MD

## 2024-10-23 NOTE — PATIENT INSTRUCTIONS
Start GLP-1 medication to help control diabetes, and/or reduce weight, and also reduce risk of future cardiac events)     Start Tirzepatide (Mounjaro) 2.5 mg once per week for 4 weeks, then increase to 5 mg once per week.      Main side effects include ( but are not limited to) nausea, vomiting, stomach upset, and very rarely pancreatitis.  Stop the medication and contact us if you develop any concerning possible side effects    One potential main obstacle with these medications can be expense.  Insurance coverage can vary and these medication are extremely expensive if not covered.  Your employer will determine the criteria for coverage of these medications.       Follow up via e-visit in 2-3 months to recheck the status of this medication.      I strognly recommend that you take an anticoagulation medication to reduce the significant risk of stroke fro, atrial fibrillation.      --STart Xarelto (Rivaroxaban) 20 mg once per day.  If this is not covered by your insura,kady mcfarlane we may need to consider Warfarin.      --Discuss this with your cardiologist for further recommendations.        Continue all other medications at the same doses.  Contact your usual pharmacy if you need refills.     Investigate a Shingrix shingles vaccine with your pharmacist .  They can tell you the coverage and cost and then give it to you if the price is acceptable.    Medicare sometimes does not cover these Shingrix shingles vaccines, and with Medicare insurance it is usually cheaper to receive this shingles vaccine from a pharmacist in a pharmacy rather than in our clinic.    At this time, you only need the 2 Shingrix vaccines and then you are done.        RSV vaccines are now available.  The will help provide protection against respiratory syncytial virus (RSV), a common upper respiratory virus that is known to cause severe inflammation in the airways.  This vaccine is recommended for adults over age 60, especially those with high  risk conditions and/or chronic respiratory illnesses such as asthma or COPD.  This vaccine is available at most pharmacies and clinics.    If you are on Medicare insurance, get this vaccine from a pharmacist in a pharmacy for the best cost and to confirm coverage, it will be less expensive to get this there rather than from our clinic.             Preventive Health Recommendations:   Male Ages 65 - 75    Yearly exam:             See your health care provider every year in order to  o   Review health changes.   o   Discuss preventive care.    o   Review your medicines if your doctor has prescribed any.  Talk with your health care provider about whether you should have a test to screen for prostate cancer (PSA).  Every 3 years, have a diabetes test (fasting glucose). If you are at risk for diabetes, you should have this test more often.  At least Every 5 years, have a cholesterol test. Have this test more often if you have medical conditions that place you at higher risk for high cholesterol or heart disease.   Regular colon cancer screening starting age 45 with either a colonoscopy, or stool test (either ColoGuard every 3 years or yearly FIT stool test from ).  The intervals for colon cancer screening will be determined by family history and prior colon cancer screening results.   Routine prostate cancer screening with a PSA blood test every 1-2 years.   While the PSA blood test is not a direct cancer screening blood test, it detects inflammation within in the prostate, which could indicate possible cancer.  If the test is abnormal, you do not necessarily have prostate cancer, but would need further evaluation.    Talk to with your health care provider about screening for Abdominal Aortic Aneurysm if you have a family history of AAA or have a history of smoking.    Vaccine recommendations:   Get a flu shot each fall.  Middle October is the optimal time to receive the flu shot.   Covid vaccines are now recommended  annually.  Get the most updated Covid vaccine when it becomes available, consider getting this at the same time as the annual influenza vaccine.   Get a tetanus shot every 10 years. (Get this vaccine from a pharmacist in a pharmacy when you are over 65 on Medicare insurance)  Everyone over 65 should make sure to receive pneumonia vaccines to prevent the most common type of bacterial pneumonia: Pneumococcal pneumonia. There are now two you should receive - Pneumovax (PPSV 23) and Prevnar (PCV 20)  Strongly consider the Shingrix shingles vaccine to give you the best chance of avoiding future shingles infection (as many as 1 and 3 adults over age 50 may develop this condition in their lifetime).      --If you have Medicare insurance, investigate the cost and coverage for Shingrix shingles vaccines with a pharmacist at a pharmacy.  They can tell you the coverage and cost and then give it to you if the price is acceptable.    --Medicare sometimes does not cover these Shingrix shingles vaccines, and with Medicare insurance it is usually cheaper to receive this shingles vaccine from a pharmacist in a pharmacy rather than in our clinic.    --At this time, you only need the 2 Shingrix vaccines and then you are done.    Consider vaccination against Respiratory Syncytial Virus (RSV) infections, especially if you have active lung disease, history of smoking, and/or cardiac conditions.  The respiratory syncytial virus (RSV), is a common upper respiratory virus that causes severe inflammation in the airways, more than most upper respiratory viruses.   This vaccine is available at most pharmacies and clinics.  At this time, the RSV vaccine is a one time vaccine.    --If you are on Medicare insurance, you need to specifically get this vaccine from a pharmacist in a pharmacy for the best cost and to confirm coverage, it will be less expensive to get this there rather than from our clinic.     Nutrition:   Eat at least 5 servings of  "fruits and vegetables each day.   Eat whole-grain bread, whole-wheat pasta and brown rice instead of white grains and rice.   Talk to your provider about Calcium and Vitamin D.      --Good Grains:  Oats, brown rice, Quinoa (these do not raise the blood sugar as much)     --Bad grains:  Anything made from wheat or white rice     (because these raise the blood sugars significantly, and the possible gluten issue from wheat for some people).      --Proteins:  Aim for \"lean proteins\" including chicken, fish, seafood, pork, turkey, and eggs (in moderation); Eat red meat only occasionally    Lifestyle  Exercise for at least 150 minutes a week (30 minutes a day, 5 days a week). This will help you control your weight and prevent disease.   Limit alcohol to one drink per day.   No smoking.   Wear sunscreen to prevent skin cancer.   See your dentist every six months for an exam and cleaning.   See your eye doctor every 1 to 2 years to screen for conditions such as glaucoma, macular degeneration, cataracts, etc                       "

## 2024-11-07 ENCOUNTER — TELEPHONE (OUTPATIENT)
Dept: INTERNAL MEDICINE | Facility: CLINIC | Age: 74
End: 2024-11-07
Payer: MEDICARE

## 2024-11-07 NOTE — TELEPHONE ENCOUNTER
Prior Authorization Retail Medication Request    Medication/Dose: fluticasone-salmeterol (AIRDUO RESPICLICK) 113-14 MCG/ACT inhaler     Diagnosis and ICD code (if different than what is on RX):  [J44.9]     Insurance   Primary: Medicare  Insurance ID:  [id number]       Pharmacy Information (if different than what is on RX)  Name:  [place]  Phone:  [phone]  Fax:[phone]

## 2025-02-05 DIAGNOSIS — I25.10 CORONARY ARTERY DISEASE INVOLVING NATIVE CORONARY ARTERY OF NATIVE HEART WITHOUT ANGINA PECTORIS: ICD-10-CM

## 2025-02-05 DIAGNOSIS — E11.59 TYPE 2 DIABETES MELLITUS WITH OTHER CIRCULATORY COMPLICATION, WITHOUT LONG-TERM CURRENT USE OF INSULIN (H): ICD-10-CM

## 2025-02-05 DIAGNOSIS — E66.01 MORBID OBESITY WITH BMI OF 40.0-44.9, ADULT (H): ICD-10-CM

## 2025-02-06 RX ORDER — TIRZEPATIDE 2.5 MG/.5ML
INJECTION, SOLUTION SUBCUTANEOUS
Qty: 2 ML | Refills: 0 | Status: SHIPPED | OUTPATIENT
Start: 2025-02-06

## 2025-02-24 DIAGNOSIS — I77.819 AORTIC DILATATION: ICD-10-CM

## 2025-02-24 DIAGNOSIS — Q23.81 BICUSPID AORTIC VALVE: ICD-10-CM

## 2025-02-24 DIAGNOSIS — I35.0 AORTIC VALVE STENOSIS, ETIOLOGY OF CARDIAC VALVE DISEASE UNSPECIFIED: Primary | ICD-10-CM

## 2025-02-24 DIAGNOSIS — Q24.9 ADULT CONGENITAL HEART DISEASE: ICD-10-CM

## 2025-02-25 ENCOUNTER — TELEPHONE (OUTPATIENT)
Dept: CARDIOLOGY | Facility: CLINIC | Age: 75
End: 2025-02-25
Payer: MEDICARE

## 2025-02-26 ENCOUNTER — MYC MEDICAL ADVICE (OUTPATIENT)
Dept: CARDIOLOGY | Facility: CLINIC | Age: 75
End: 2025-02-26
Payer: MEDICARE

## 2025-03-03 NOTE — TELEPHONE ENCOUNTER
Spoke with patient, passed on the following message form Dr Du:   [employer]- I looked through all his records and looks like it was stopped sometime between 2015 and 2016 but there are no details in the chart or care everywhere about the bleed.    If he has recurrent afib, than would be safest to be on anticoagulation as risk of stroke is high.  If he would like, he could see GI and be seen in structural clinic for consideration of Watchman.  If he wants to start xarelto in the meantime, it would be safest. If he wants to be seen in consultation by those providers mentioned first, that is his choice.  Thank you     Patient declined referrals or anticoagulant at this time, states he has not had A fib for a long time and can tell when he's in it. Offered Zio patch to make sure he wasn't having any and he declined at this time. He is scheduled to see Dr Du this summer and will call if he changes his mind or has symptoms of A fib. Reviewed the risks and reason for anticoagulation.

## 2025-03-05 DIAGNOSIS — E11.59 TYPE 2 DIABETES MELLITUS WITH OTHER CIRCULATORY COMPLICATION, WITHOUT LONG-TERM CURRENT USE OF INSULIN (H): ICD-10-CM

## 2025-03-05 DIAGNOSIS — E66.01 MORBID OBESITY WITH BMI OF 40.0-44.9, ADULT (H): ICD-10-CM

## 2025-03-05 DIAGNOSIS — I25.10 CORONARY ARTERY DISEASE INVOLVING NATIVE CORONARY ARTERY OF NATIVE HEART WITHOUT ANGINA PECTORIS: ICD-10-CM

## 2025-03-06 RX ORDER — TIRZEPATIDE 2.5 MG/.5ML
INJECTION, SOLUTION SUBCUTANEOUS
Qty: 2 ML | Refills: 0 | Status: SHIPPED | OUTPATIENT
Start: 2025-03-06

## 2025-03-22 ENCOUNTER — HEALTH MAINTENANCE LETTER (OUTPATIENT)
Age: 75
End: 2025-03-22

## 2025-03-27 ENCOUNTER — TELEPHONE (OUTPATIENT)
Dept: CARDIOLOGY | Facility: CLINIC | Age: 75
End: 2025-03-27
Payer: MEDICARE

## 2025-03-27 NOTE — TELEPHONE ENCOUNTER
Returned pt call in regards to needing to reschedule his July appts. I informed the pt that Dr. Benoit next available [facility] is on 8/14. Pt stated that was to far out for him and didn't know what to do. I asked the pt if he wanted to think about it and call me back to let me know. He said that he would like to do that. I told the pt the if I didn't heat from  him by the end of the week I would reach out to him on Tuesday. Pt agreed with the plan.

## 2025-04-01 DIAGNOSIS — E11.59 TYPE 2 DIABETES MELLITUS WITH OTHER CIRCULATORY COMPLICATION, WITHOUT LONG-TERM CURRENT USE OF INSULIN (H): ICD-10-CM

## 2025-04-01 DIAGNOSIS — E66.01 MORBID OBESITY WITH BMI OF 40.0-44.9, ADULT (H): ICD-10-CM

## 2025-04-01 DIAGNOSIS — I25.10 CORONARY ARTERY DISEASE INVOLVING NATIVE CORONARY ARTERY OF NATIVE HEART WITHOUT ANGINA PECTORIS: ICD-10-CM

## 2025-04-01 RX ORDER — TIRZEPATIDE 2.5 MG/.5ML
INJECTION, SOLUTION SUBCUTANEOUS
Qty: 2 ML | Refills: 0 | Status: SHIPPED | OUTPATIENT
Start: 2025-04-01

## 2025-04-16 DIAGNOSIS — I10 BENIGN ESSENTIAL HYPERTENSION: ICD-10-CM

## 2025-04-16 RX ORDER — AMLODIPINE BESYLATE 5 MG/1
5 TABLET ORAL DAILY
Qty: 90 TABLET | Refills: 1 | Status: SHIPPED | OUTPATIENT
Start: 2025-04-16

## 2025-05-03 ENCOUNTER — HEALTH MAINTENANCE LETTER (OUTPATIENT)
Age: 75
End: 2025-05-03

## 2025-05-05 ENCOUNTER — TELEPHONE (OUTPATIENT)
Dept: INTERNAL MEDICINE | Facility: CLINIC | Age: 75
End: 2025-05-05
Payer: MEDICARE

## 2025-05-05 NOTE — TELEPHONE ENCOUNTER
Prior Authorization Retail Medication Request    Medication/Dose: MOUNJARO 2.5 MG/0.5ML SOAJ auto-injector pen  Diagnosis and ICD code (if different than what is on RX):  [I25.10] [E11.59] [E66.01, Z68.41]     Insurance   Primary: Medicare   Insurance ID:  [id number]

## 2025-05-05 NOTE — TELEPHONE ENCOUNTER
Please refrain from closing out PA encounters until it has been addressed fully by the [facility] PA Team. Status will be updated to Approved/Denied/Dismissed/PA Not Needed. Closing/signing the encounters results in missed submission to the patient's insurance as that encounter disappears from our PA Pool and does not get sent. Thank you!  Retail Pharmacy Prior Authorization Team   Phone: [phone]      CM KEY: (Key: [id number])    PA Initiation    Medication: MOUNJARO 2.5 MG/0.5ML SC SOAJ  Insurance Company: [employer] - Phone [phone] Fax [phone]  Pharmacy Filling the Rx: [id number] - [place], [place] - [address] AT [facility] [address]  Filling Pharmacy Phone: [phone]  Filling Pharmacy Fax:    Start Date: 5/5/2025

## 2025-05-05 NOTE — TELEPHONE ENCOUNTER
Retail Pharmacy Prior Authorization Team   Phone: [phone]      Prior Authorization Approval    Medication: MOUNJARO 2.5 MG/0.5ML SC SOAJ  Authorization Effective Date: 4/29/2025  Authorization Expiration Date:  UNTIL FURTHER NOTICE  Approved Dose/Quantity: 2 PER 28 DAYS  Reference #: (Key: [id number]), Authorization #: [id number]   Insurance Company: [employer] - Phone [phone] Fax [phone]  Expected CoPay: $    CoPay Card Available: No    Financial Assistance Needed:    Which Pharmacy is filling the prescription: [id number] - [facility] [address] AT [facility] [address] [facility]  Pharmacy Notified: yES  Patient Notified: **Instructed pharmacy to notify patient when script is ready to /ship.**

## 2025-05-05 NOTE — TELEPHONE ENCOUNTER
[facility]. Routing to PA team for review. Pt was also advised to contact his insurance and ask them to review PA information. Please route message to Ox triage pool if follow up is needed by nurse or PCP. Thank you.

## 2025-05-05 NOTE — TELEPHONE ENCOUNTER
Triage notified pt of approval via [facility] - postponing encounter to allow pt time to review.    Salima White RN

## 2025-05-07 NOTE — TELEPHONE ENCOUNTER
Per chart review, pt has not yet read [employer] message yet.   Called and spoke with pt to relay PA approval.   He states he was already aware of this and has picked up the medication.     Closing encounter.   Thank you,  Lizeth Gaston RN

## 2025-06-02 ENCOUNTER — TRANSFERRED RECORDS (OUTPATIENT)
Dept: HEALTH INFORMATION MANAGEMENT | Facility: CLINIC | Age: 75
End: 2025-06-02
Payer: MEDICARE

## 2025-06-23 DIAGNOSIS — E11.59 TYPE 2 DIABETES MELLITUS WITH OTHER CIRCULATORY COMPLICATION, WITHOUT LONG-TERM CURRENT USE OF INSULIN (H): ICD-10-CM

## 2025-06-23 DIAGNOSIS — I25.10 CORONARY ARTERY DISEASE INVOLVING NATIVE CORONARY ARTERY OF NATIVE HEART WITHOUT ANGINA PECTORIS: ICD-10-CM

## 2025-06-23 DIAGNOSIS — E66.01 MORBID OBESITY WITH BMI OF 40.0-44.9, ADULT (H): ICD-10-CM

## 2025-06-24 RX ORDER — TIRZEPATIDE 5 MG/.5ML
INJECTION, SOLUTION SUBCUTANEOUS
Qty: 2 ML | Refills: 1 | Status: SHIPPED | OUTPATIENT
Start: 2025-06-24

## 2025-06-26 DIAGNOSIS — I10 BENIGN ESSENTIAL HYPERTENSION: ICD-10-CM

## 2025-06-26 DIAGNOSIS — E11.59 TYPE 2 DIABETES MELLITUS WITH OTHER CIRCULATORY COMPLICATION, WITHOUT LONG-TERM CURRENT USE OF INSULIN (H): ICD-10-CM

## 2025-06-26 RX ORDER — LISINOPRIL 40 MG/1
40 TABLET ORAL DAILY
Qty: 90 TABLET | Refills: 0 | Status: SHIPPED | OUTPATIENT
Start: 2025-06-26

## 2025-07-10 ENCOUNTER — OFFICE VISIT (OUTPATIENT)
Dept: CARDIOLOGY | Facility: CLINIC | Age: 75
End: 2025-07-10
Payer: MEDICARE

## 2025-07-10 VITALS
BODY MASS INDEX: 38.13 KG/M2 | WEIGHT: 251.6 LBS | DIASTOLIC BLOOD PRESSURE: 77 MMHG | SYSTOLIC BLOOD PRESSURE: 112 MMHG | OXYGEN SATURATION: 98 % | HEART RATE: 100 BPM | HEIGHT: 68 IN

## 2025-07-10 DIAGNOSIS — Q24.9 ADULT CONGENITAL HEART DISEASE: ICD-10-CM

## 2025-07-10 DIAGNOSIS — I48.0 PAROXYSMAL ATRIAL FIBRILLATION (H): ICD-10-CM

## 2025-07-10 DIAGNOSIS — M54.9 BACK PAIN, UNSPECIFIED BACK LOCATION, UNSPECIFIED BACK PAIN LATERALITY, UNSPECIFIED CHRONICITY: Primary | ICD-10-CM

## 2025-07-10 DIAGNOSIS — I10 BENIGN ESSENTIAL HYPERTENSION: ICD-10-CM

## 2025-07-10 DIAGNOSIS — I77.819 AORTIC DILATATION: ICD-10-CM

## 2025-07-10 DIAGNOSIS — Q23.81 BICUSPID AORTIC VALVE: ICD-10-CM

## 2025-07-10 DIAGNOSIS — I35.0 AORTIC VALVE STENOSIS, ETIOLOGY OF CARDIAC VALVE DISEASE UNSPECIFIED: ICD-10-CM

## 2025-07-10 DIAGNOSIS — G47.33 OSA (OBSTRUCTIVE SLEEP APNEA): ICD-10-CM

## 2025-07-10 DIAGNOSIS — J44.9 CHRONIC OBSTRUCTIVE PULMONARY DISEASE, UNSPECIFIED COPD TYPE (H): ICD-10-CM

## 2025-07-10 NOTE — PROGRESS NOTES
CARDIOLOGY CONSULTATION:    Mr. Solis is a very pleasant, 75-year-old gentleman whom I met in 11/2016 when he presented with an NSTEMI.  He was found to have moderate aortic valve stenosis with a mean gradient that has been between 25-33 mmHg and moderate disease in his coronary arteries with small vessels distally.  He has been managed medically and doing well.        He has been started on Mounjaro February 2025  and has lost 30 lbs.  His BMI has come down from 40 to 38.  His blood pressure is under control.   A1c decreased from 6.5 to 6.    He has a history of paroxysmal atrial fibrillation but is not on anticoagulation at his request (had GI bleed on Xarelto).  He is in sinus rhythm today. His brother had his aortic valve replaced in 2018; it was likely bicuspid. Unfortunately his brother passed away in 2023      On Mr. Solis's valve, it is not clear whether it is bicuspid because of the degree of calcification, but given his brother's disease, this is likely the case.    His ascending aorta has been between 3.8-4.1, which is where it was on his echo today.  His mean gradient across his aortic valve has been creeping up and is currently 25-33 mmHg, but stable compared to last year's echo. He had a CTA of his chest today and that is pending.       Mr. Solis denies any chest pain, tightness.  No syncope or pre-syncope.  He golfs for exercise but use a cart. He goes to [place] for the Winter.  Mr. Solis does have moderate obstructive sleep apnea and has not been using his CPAP mask because he can not get supplies; needs referral to sleep medicine.  He would like to see pulmonary as well.  Lexiscan is scheduled for next week to evaluate his SOB    Mr. Solis has chronic knee and back pain and is interested in seeing ortho spine and having his knees looked at.      PAST MEDICAL HISTORY:  Past Medical History:   Diagnosis Date    Atrial fibrillation (H)     paroxysmal    Basal cell carcinoma     COPD  (chronic obstructive pulmonary disease) (H) 08/06/2019    FEV1 53 %    Coronary artery disease involving native coronary artery of native heart 11/30/16    NSTEMI (peak troponin 0.05); coronary angiogram showed modeerate nonocclusive disease: LM 30-40 %, LAD 30-40%, CX 30-40 %    Diverticulosis of colon 4/16/12    sigmoid diverticulosis per CT scan; episode acute diverticulitis per CT scan    Heart valve insufficiency     Lipoma of other skin and subcutaneous tissue     Lumbar spinal stenosis 9/1/10    lumbar spinal stenosis at L5    NSTEMI (non-ST elevated myocardial infarction) (H) 11/30/16    NSTEMI (peak troponin 0.05); no acute lesions on angiogram    Obesity (BMI 30-39.9) 7/9/13    BMI 38    Obstructive sleep apnea 10/7/14    per sleep study:  AHI 25/hr, desats to 84%. Supine /hr, only 6 minutes spent supine    Other and unspecified hyperlipidemia     Prediabetes 7/9/13    elevated fasting glucoses    Shingles 11/2017    dx when he was in [place]    Tubular adenoma of colon 8/13/13    3 adenomatous poltyps removed, 4 hyperplastic polyps removed    Unspecified essential hypertension        CURRENT MEDICATIONS:  Current Outpatient Medications   Medication Sig Dispense Refill    albuterol (PROAIR HFA/PROVENTIL HFA/VENTOLIN HFA) 108 (90 Base) MCG/ACT inhaler Inhale 2 puffs into the lungs every 4 hours as needed for shortness of breath or wheezing. 18 g 11    amLODIPine (NORVASC) 5 MG tablet TAKE 1 TABLET(5 MG) BY MOUTH DAILY 90 tablet 1    aspirin 81 MG tablet Take 1 tablet by mouth daily.      fluticasone-salmeterol (AIRDUO RESPICLICK) 113-14 MCG/ACT inhaler Inhale 1 puff into the lungs 2 times daily. Use twice daily in the main seasons for breathing problems 3 each 3    furosemide (LASIX) 20 MG tablet Take 2 tablets (40 mg) by mouth every morning. 180 tablet 1    lisinopril (ZESTRIL) 40 MG tablet TAKE 1 TABLET(40 MG) BY MOUTH DAILY 90 tablet 0    metoprolol succinate ER (TOPROL XL) 50 MG 24 hr tablet Take  1.5 tablets (75 mg) by mouth daily. 135 tablet 3    MOUNJARO 5 MG/0.5ML SOAJ auto-injector pen ADMINISTER 5 MG UNDER THE SKIN EVERY 7 DAYS 2 mL 1    rivaroxaban ANTICOAGULANT (XARELTO) 20 MG TABS tablet Take 1 tablet (20 mg) by mouth daily (with dinner). 30 tablet 5    rosuvastatin (CRESTOR) 40 MG tablet Take 1 tablet (40 mg) by mouth daily. 90 tablet 3       PAST SURGICAL HISTORY:  Past Surgical History:   Procedure Laterality Date    BIOPSY  ?    fatty lumps    COLONOSCOPY  2013    3 adenomatous polyps and 4 hyperplastic polyps removed    HC TOOTH EXTRACTION W/FORCEP  1983    4 wisdom teeth removed withou difficulty    ORTHOPEDIC SURGERY  2015    Rt ankle-adjust heel, recon ligament    SOFT TISSUE SURGERY  2015    Rt ankle-recon tendon       ALLERGIES  No known allergies    FAMILY HX:  Family History   Problem Relation Age of Onset    Hypertension Mother         B:1920    Colon Cancer Mother 84        at age 86    Kidney Cancer Mother 95         age 95    Other Cancer Mother         Bone & Kidney-passedat age 95    Heart Disease Father         D: 70's  MI    Hypertension Brother         lipids as well    Heart Disease Brother         CABG x 3, mitral valve reaplcement    Colon Cancer Brother 68    Respiratory Brother         PRACHI    Myocardial Infarction Brother 69        sudden cardiac death    Hypertension Brother     Hyperlipidemia Brother     Coronary Artery Disease Brother         Had valve replaced    Colon Cancer Brother         2022    Diabetes No family hx of     Cerebrovascular Disease No family hx of        SOCIAL HX:  Social History     Socioeconomic History    Marital status:    Tobacco Use    Smoking status: Former     Current packs/day: 0.00     Average packs/day: 0.8 packs/day for 20.0 years (15.0 ttl pk-yrs)     Types: Cigarettes     Start date: 1986     Quit date: 2006     Years since quittin.1    Smokeless tobacco: Never    Tobacco comments:      had quit for 1 year, smoked on and off for years, quit again 5/06   Substance and Sexual Activity    Alcohol use: Yes     Alcohol/week: 4.0 standard drinks of alcohol     Types: 4 Glasses of wine per week     Comment: 2-3 glasses of wine    Drug use: No    Sexual activity: Not Currently     Partners: Female   Other Topics Concern    Parent/sibling w/ CABG, MI or angioplasty before 65F 55M? No    Special Diet No    Exercise No     Social Drivers of Health     Financial Resource Strain: Low Risk  (10/23/2024)    Financial Resource Strain     Within the past 12 months, have you or your family members you live with been unable to get utilities (heat, electricity) when it was really needed?: No   Food Insecurity: Low Risk  (10/23/2024)    Food Insecurity     Within the past 12 months, did you worry that your food would run out before you got money to buy more?: No     Within the past 12 months, did the food you bought just not last and you didn t have money to get more?: No   Transportation Needs: Low Risk  (10/23/2024)    Transportation Needs     Within the past 12 months, has lack of transportation kept you from medical appointments, getting your medicines, non-medical meetings or appointments, work, or from getting things that you need?: No   Physical Activity: Inactive (10/23/2024)    Exercise Vital Sign     Days of Exercise per Week: 0 days     Minutes of Exercise per Session: 0 min   Stress: No Stress Concern Present (10/23/2024)    [employer] - Occupational Stress Questionnaire     Feeling of Stress : Not at all   Social Connections: Unknown (10/23/2024)    Social Connection and Isolation Panel [NHANES]     Frequency of Social Gatherings with Friends and Family: Once a week   Interpersonal Safety: Low Risk  (10/23/2024)    Interpersonal Safety     Do you feel physically and emotionally safe where you currently live?: Yes     Within the past 12 months, have you been hit, slapped,  "kicked or otherwise physically hurt by someone?: No     Within the past 12 months, have you been humiliated or emotionally abused in other ways by your partner or ex-partner?: No   Housing Stability: Low Risk  (10/23/2024)    Housing Stability     Do you have housing? : Yes     Are you worried about losing your housing?: No       ROS:  Constitutional: No fever, chills, or sweats. No weight gain/loss.   ENT: No visual disturbance, ear ache, epistaxis, sore throat.   Allergies/Immunologic: Negative.   Respiratory: No cough, hemoptysis.   Cardiovascular: As per HPI.   GI: No nausea, vomiting, hematemesis, melena, or hematochezia.   : No urinary frequency, dysuria, or hematuria.   Integument: Negative.   Psychiatric: Negative.   Neuro: Negative.   Endocrinology: Negative.   Musculoskeletal: No myalgia.    VITAL SIGNS:  /77 (BP Location: Left arm, Patient Position: Sitting)   Pulse 100   Ht 1.715 m (5' 7.5\")   Wt 114.1 kg (251 lb 9.6 oz)   SpO2 98%   BMI 38.82 kg/m    Body mass index is 38.82 kg/m .  Wt Readings from Last 2 Encounters:   07/10/25 114.1 kg (251 lb 9.6 oz)   10/23/24 118.9 kg (262 lb 1.6 oz)       PHYSICAL EXAM  Giacomo Solis IS A 75 year old male.in no acute distress.  HEENT: Unremarkable.  Neck: JVP normal.   Lungs: CTA.  Cor: RRR. Normal S1 and S2.  Mid to late peaking systolic murmur.  Abd: Soft, nontender, nondistended.   Extremities: No C/C/E.  Neuro: Grossly intact.     LABS    Lab Results   Component Value Date    WBC 5.7 07/25/2023    WBC 5.7 06/27/2021     Lab Results   Component Value Date    RBC 5.70 07/25/2023    RBC 5.63 06/27/2021     Lab Results   Component Value Date    HGB 16.1 10/23/2024    HGB 16.0 06/27/2021     Lab Results   Component Value Date    HCT 49.0 07/25/2023    HCT 48.8 06/27/2021     No components found for: \"MCT\"  Lab Results   Component Value Date    MCV 86 07/25/2023    MCV 87 06/27/2021     Lab Results   Component Value Date    MCH 28.1 07/25/2023    " MCH 28.4 06/27/2021     Lab Results   Component Value Date    MCHC 32.7 07/25/2023    MCHC 32.8 06/27/2021     Lab Results   Component Value Date    RDW 13.8 07/25/2023    RDW 14.6 06/27/2021     Lab Results   Component Value Date     07/25/2023     06/27/2021      Recent Labs   Lab Test 10/23/24  1614 07/25/23  1103    140   POTASSIUM 4.3 4.1   CHLORIDE 101 104   CO2 27 26   ANIONGAP 12 10   * 119*   BUN 12.6 13.5   CR 0.90 0.89   LASHAY 9.3 8.8     Recent Labs   Lab Test 07/25/23  1103 09/20/22  1353   CHOL 142 151   HDL 36* 39*   LDL 80 81   TRIG 130 157*   NHDL 106 112        IMPRESSION, REPORT, PLAN:   1.  Moderate coronary artery disease, managed medically with last cath 12/2016, currently asymptomatic.   2.  Hypertension, controlled.   3.  History of GI bleed   4.  Paroxysmal atrial fibrillation- not on anticoagulation because patient has not wanted  5.  Hyperlipidemia - LDL 80 on Crestor 40  6.  BMI 38 (down from 40 2024)  7.  Moderate bicuspid aortic valve stenosis with a mean gradient of 27- 33 mmHg.     8.  Ascending aortic dilatation at 4 cm (CTA 7/2025 pending)  9.  Obstructive sleep apnea   10.  SOB  11.  DM II with A1C 6 (on Mounjaro)  12. Spinal stenosis  13.  Bilateral knee/hip pain     DISCUSSION:  It was a pleasure seeing Mr. Solis in followup.  He is doing well from a cardiovascular standpoint and does not have any concerning cardiac symptoms.  He does have SOB and is interested in seeing pulmonary medicine with concerns for COPD.  He also would like a referral to sleep medicine as he can not get his PRACHI supplies so has not used it in the past month.  For his SOB lexiscan is scheduled for next week.      His gradient across his valve is stable.  His murmur is a little more consistent with moderate to severe aortic stenosis -- calcium evaluation of his valve with a CT as well as his dedication measurement of his aorta is pending from today.      Would like referral to  ortho spine for his back (hx of spinal stenosis).  Also discussed being seen at [facility] for his knees.      Not on anticoagulation with his paroxysmal afib (at his request).  Plan zio patch to assess burden. Did discuss again stroke risk.      He will continue to work on diet and exercise.  Follow up and echo in a year.  He will let us know if he has any concerning symptoms before then.     It was a pleasure to see him today.  Please do not hesitate to contact me with any questions or concerns.      SADI THIBODEAUX MD    45 minutes face-face, documentation and review of records on day of visit    The longitudinal plan of care for the diagnosis(es)/condition(s) as documented were addressed during this visit. Due to the added complexity in care, I will continue to support Cornelius in the subsequent management and with ongoing continuity of care.

## 2025-07-10 NOTE — LETTER
7/10/2025    Sinan Stephens MD  [address] [facility] [address]    RE: Giacomo Solis       Dear Colleague,     I had the pleasure of seeing Giacomo Solis in the [facility].  CARDIOLOGY CONSULTATION:    Mr. Solis is a very pleasant, 75-year-old gentleman whom I met in 11/2016 when he presented with an NSTEMI.  He was found to have moderate aortic valve stenosis with a mean gradient that has been between 25-33 mmHg and moderate disease in his coronary arteries with small vessels distally.  He has been managed medically and doing well.        He has been started on Mounjaro February 2025  and has lost 30 lbs.  His BMI has come down from 40 to 38.  His blood pressure is under control.   A1c decreased from 6.5 to 6.    He has a history of paroxysmal atrial fibrillation but is not on anticoagulation at his request (had GI bleed on Xarelto).  He is in sinus rhythm today. His brother had his aortic valve replaced in 2018; it was likely bicuspid. Unfortunately his brother passed away in 2023      On Mr. Solis's valve, it is not clear whether it is bicuspid because of the degree of calcification, but given his brother's disease, this is likely the case.    His ascending aorta has been between 3.8-4.1, which is where it was on his echo today.  His mean gradient across his aortic valve has been creeping up and is currently 25-33 mmHg, but stable compared to last year's echo. He had a CTA of his chest today and that is pending.       Mr. Solis denies any chest pain, tightness.  No syncope or pre-syncope.  He golfs for exercise but use a cart. He goes to [place] for the Winter.  Mr. Solis does have moderate obstructive sleep apnea and has not been using his CPAP mask because he can not get supplies; needs referral to sleep medicine.  He would like to see pulmonary as well.  Lexiscan is scheduled for next week to evaluate his SOB    Mr. Solis has chronic knee and back pain and  is interested in seeing ortho spine and having his knees looked at.      PAST MEDICAL HISTORY:  Past Medical History:   Diagnosis Date     Atrial fibrillation (H)     paroxysmal     Basal cell carcinoma      COPD (chronic obstructive pulmonary disease) (H) 08/06/2019    FEV1 53 %     Coronary artery disease involving native coronary artery of native heart 11/30/16    NSTEMI (peak troponin 0.05); coronary angiogram showed modeerate nonocclusive disease: LM 30-40 %, LAD 30-40%, CX 30-40 %     Diverticulosis of colon 4/16/12    sigmoid diverticulosis per CT scan; episode acute diverticulitis per CT scan     Heart valve insufficiency      Lipoma of other skin and subcutaneous tissue      Lumbar spinal stenosis 9/1/10    lumbar spinal stenosis at L5     NSTEMI (non-ST elevated myocardial infarction) (H) 11/30/16    NSTEMI (peak troponin 0.05); no acute lesions on angiogram     Obesity (BMI 30-39.9) 7/9/13    BMI 38     Obstructive sleep apnea 10/7/14    per sleep study:  AHI 25/hr, desats to 84%. Supine /hr, only 6 minutes spent supine     Other and unspecified hyperlipidemia      Prediabetes 7/9/13    elevated fasting glucoses     Shingles 11/2017    dx when he was in [place]     Tubular adenoma of colon 8/13/13    3 adenomatous poltyps removed, 4 hyperplastic polyps removed     Unspecified essential hypertension        CURRENT MEDICATIONS:  Current Outpatient Medications   Medication Sig Dispense Refill     albuterol (PROAIR HFA/PROVENTIL HFA/VENTOLIN HFA) 108 (90 Base) MCG/ACT inhaler Inhale 2 puffs into the lungs every 4 hours as needed for shortness of breath or wheezing. 18 g 11     amLODIPine (NORVASC) 5 MG tablet TAKE 1 TABLET(5 MG) BY MOUTH DAILY 90 tablet 1     aspirin 81 MG tablet Take 1 tablet by mouth daily.       fluticasone-salmeterol (AIRDUO RESPICLICK) 113-14 MCG/ACT inhaler Inhale 1 puff into the lungs 2 times daily. Use twice daily in the main seasons for breathing problems 3 each 3      furosemide (LASIX) 20 MG tablet Take 2 tablets (40 mg) by mouth every morning. 180 tablet 1     lisinopril (ZESTRIL) 40 MG tablet TAKE 1 TABLET(40 MG) BY MOUTH DAILY 90 tablet 0     metoprolol succinate ER (TOPROL XL) 50 MG 24 hr tablet Take 1.5 tablets (75 mg) by mouth daily. 135 tablet 3     MOUNJARO 5 MG/0.5ML SOAJ auto-injector pen ADMINISTER 5 MG UNDER THE SKIN EVERY 7 DAYS 2 mL 1     rivaroxaban ANTICOAGULANT (XARELTO) 20 MG TABS tablet Take 1 tablet (20 mg) by mouth daily (with dinner). 30 tablet 5     rosuvastatin (CRESTOR) 40 MG tablet Take 1 tablet (40 mg) by mouth daily. 90 tablet 3       PAST SURGICAL HISTORY:  Past Surgical History:   Procedure Laterality Date     BIOPSY  ?    fatty lumps     COLONOSCOPY  2013    3 adenomatous polyps and 4 hyperplastic polyps removed     HC TOOTH EXTRACTION W/FORCEP  1983    4 wisdom teeth removed withou difficulty     ORTHOPEDIC SURGERY  2015    Rt ankle-adjust heel, recon ligament     SOFT TISSUE SURGERY  2015    Rt ankle-recon tendon       ALLERGIES  No known allergies    FAMILY HX:  Family History   Problem Relation Age of Onset     Hypertension Mother         B:1920     Colon Cancer Mother 84        at age 86     Kidney Cancer Mother 95         age 95     Other Cancer Mother         Bone & Kidney-passedat age 95     Heart Disease Father         D: 70's  MI     Hypertension Brother         lipids as well     Heart Disease Brother         CABG x 3, mitral valve reaplcement     Colon Cancer Brother 68     Respiratory Brother         PRACHI     Myocardial Infarction Brother 69        sudden cardiac death     Hypertension Brother      Hyperlipidemia Brother      Coronary Artery Disease Brother         Had valve replaced     Colon Cancer Brother         2022     Diabetes No family hx of      Cerebrovascular Disease No family hx of        SOCIAL HX:  Social History     Socioeconomic History     Marital status:    Tobacco Use     Smoking  status: Former     Current packs/day: 0.00     Average packs/day: 0.8 packs/day for 20.0 years (15.0 ttl pk-yrs)     Types: Cigarettes     Start date: 1986     Quit date: 2006     Years since quittin.1     Smokeless tobacco: Never     Tobacco comments:     had quit for 1 year, smoked on and off for years, quit again    Substance and Sexual Activity     Alcohol use: Yes     Alcohol/week: 4.0 standard drinks of alcohol     Types: 4 Glasses of wine per week     Comment: 2-3 glasses of wine     Drug use: No     Sexual activity: Not Currently     Partners: Female   Other Topics Concern     Parent/sibling w/ CABG, MI or angioplasty before 65F 55M? No     Special Diet No     Exercise No     Social Drivers of Health     Financial Resource Strain: Low Risk  (10/23/2024)    Financial Resource Strain      Within the past 12 months, have you or your family members you live with been unable to get utilities (heat, electricity) when it was really needed?: No   Food Insecurity: Low Risk  (10/23/2024)    Food Insecurity      Within the past 12 months, did you worry that your food would run out before you got money to buy more?: No      Within the past 12 months, did the food you bought just not last and you didn t have money to get more?: No   Transportation Needs: Low Risk  (10/23/2024)    Transportation Needs      Within the past 12 months, has lack of transportation kept you from medical appointments, getting your medicines, non-medical meetings or appointments, work, or from getting things that you need?: No   Physical Activity: Inactive (10/23/2024)    Exercise Vital Sign      Days of Exercise per Week: 0 days      Minutes of Exercise per Session: 0 min   Stress: No Stress Concern Present (10/23/2024)    [employer] - Occupational Stress Questionnaire      Feeling of Stress : Not at all   Social Connections: Unknown (10/23/2024)    Social Connection and Isolation Panel [NHANES]  "     Frequency of Social Gatherings with Friends and Family: Once a week   Interpersonal Safety: Low Risk  (10/23/2024)    Interpersonal Safety      Do you feel physically and emotionally safe where you currently live?: Yes      Within the past 12 months, have you been hit, slapped, kicked or otherwise physically hurt by someone?: No      Within the past 12 months, have you been humiliated or emotionally abused in other ways by your partner or ex-partner?: No   Housing Stability: Low Risk  (10/23/2024)    Housing Stability      Do you have housing? : Yes      Are you worried about losing your housing?: No       ROS:  Constitutional: No fever, chills, or sweats. No weight gain/loss.   ENT: No visual disturbance, ear ache, epistaxis, sore throat.   Allergies/Immunologic: Negative.   Respiratory: No cough, hemoptysis.   Cardiovascular: As per HPI.   GI: No nausea, vomiting, hematemesis, melena, or hematochezia.   : No urinary frequency, dysuria, or hematuria.   Integument: Negative.   Psychiatric: Negative.   Neuro: Negative.   Endocrinology: Negative.   Musculoskeletal: No myalgia.    VITAL SIGNS:  /77 (BP Location: Left arm, Patient Position: Sitting)   Pulse 100   Ht 1.715 m (5' 7.5\")   Wt 114.1 kg (251 lb 9.6 oz)   SpO2 98%   BMI 38.82 kg/m    Body mass index is 38.82 kg/m .  Wt Readings from Last 2 Encounters:   07/10/25 114.1 kg (251 lb 9.6 oz)   10/23/24 118.9 kg (262 lb 1.6 oz)       PHYSICAL EXAM  Giacomo LANIER [place] IS A 75 year old male.in no acute distress.  HEENT: Unremarkable.  Neck: JVP normal.   Lungs: CTA.  Cor: RRR. Normal S1 and S2.  Mid to late peaking systolic murmur.  Abd: Soft, nontender, nondistended.   Extremities: No C/C/E.  Neuro: Grossly intact.     LABS    Lab Results   Component Value Date    WBC 5.7 07/25/2023    WBC 5.7 06/27/2021     Lab Results   Component Value Date    RBC 5.70 07/25/2023    RBC 5.63 06/27/2021     Lab Results   Component Value Date    HGB 16.1 10/23/2024 " "   HGB 16.0 06/27/2021     Lab Results   Component Value Date    HCT 49.0 07/25/2023    HCT 48.8 06/27/2021     No components found for: \"MCT\"  Lab Results   Component Value Date    MCV 86 07/25/2023    MCV 87 06/27/2021     Lab Results   Component Value Date    MCH 28.1 07/25/2023    MCH 28.4 06/27/2021     Lab Results   Component Value Date    MCHC 32.7 07/25/2023    MCHC 32.8 06/27/2021     Lab Results   Component Value Date    RDW 13.8 07/25/2023    RDW 14.6 06/27/2021     Lab Results   Component Value Date     07/25/2023     06/27/2021      Recent Labs   Lab Test 10/23/24  1614 07/25/23  1103    140   POTASSIUM 4.3 4.1   CHLORIDE 101 104   CO2 27 26   ANIONGAP 12 10   * 119*   BUN 12.6 13.5   CR 0.90 0.89   LASHAY 9.3 8.8     Recent Labs   Lab Test 07/25/23  1103 09/20/22  1353   CHOL 142 151   HDL 36* 39*   LDL 80 81   TRIG 130 157*   NHDL 106 112        IMPRESSION, REPORT, PLAN:   1.  Moderate coronary artery disease, managed medically with last cath 12/2016, currently asymptomatic.   2.  Hypertension, controlled.   3.  History of GI bleed   4.  Paroxysmal atrial fibrillation- not on anticoagulation because patient has not wanted  5.  Hyperlipidemia - LDL 80 on Crestor 40  6.  BMI 38 (down from 40 2024)  7.  Moderate bicuspid aortic valve stenosis with a mean gradient of 27- 33 mmHg.     8.  Ascending aortic dilatation at 4 cm (CTA 7/2025 pending)  9.  Obstructive sleep apnea   10.  SOB  11.  DM II with A1C 6 (on Mounjaro)  12. Spinal stenosis  13.  Bilateral knee/hip pain     DISCUSSION:  It was a pleasure seeing Mr. Solis in followup.  He is doing well from a cardiovascular standpoint and does not have any concerning cardiac symptoms.  He does have SOB and is interested in seeing pulmonary medicine with concerns for COPD.  He also would like a referral to sleep medicine as he can not get his PRACHI supplies so has not used it in the past month.  For his SOB lexiscan is scheduled for " next week.      His gradient across his valve is stable.  His murmur is a little more consistent with moderate to severe aortic stenosis -- calcium evaluation of his valve with a CT as well as his dedication measurement of his aorta is pending from today.      Would like referral to ortho spine for his back (hx of spinal stenosis).  Also discussed being seen at [facility] for his [facility].      Not on anticoagulation with his paroxysmal afib (at his request).  Plan zio patch to assess burden. Did discuss again stroke risk.      He will continue to work on diet and exercise.  Follow up and echo in a year.  He will let us know if he has any concerning symptoms before then.     It was a pleasure to see him today.  Please do not hesitate to contact me with any questions or concerns.      DUC DU MD    45 minutes face-face, documentation and review of records on day of visit    The longitudinal plan of care for the diagnosis(es)/condition(s) as documented were addressed during this visit. Due to the added complexity in care, I will continue to support Cornelius in the subsequent management and with ongoing continuity of care.     Thank you for allowing me to participate in the care of your patient.      Sincerely,     Duc Du MD     [facility]  cc:   Duc Du MD  [address] [place] [address] [address]

## 2025-07-10 NOTE — PATIENT INSTRUCTIONS
"Thank you for visiting the Adult Congenital and Cardiovascular Genetics Clinic at the [facility].    Cardiology Providers you saw during your visit:  ESTER Du MD    Diagnosis:  BAV, Afib    Results:  ESTER Du MD reviewed the results of your echocardiogram testing today in clinic.    If you have questions or concerns, please call us at [phone] or contact us through [employer].  ______________________________________________________________________________    Recommendations from your Cardiology Provider TODAY:    Complete your Lexiscan as scheduled  We will update you with the results of your CTA  Referral for pulmonology.  If you don t hear from a representative within 2 business days, please call [phone].   Referral for sleep medicine  Referral for ortho/spine for your back. Call if you don't hear from them in 2 days [phone]       ____________________________________________________________________________________________________    Follow-up Plan:  Follow up with Dr Du in 1 year with an echocardiogram and 7 day Zio prior      ____________________________________________________________________________________________________    If you have questions or concerns, please call us at [phone] or contact us through [employer]. Our fax number is [phone]    Sindhu Brito RN RN, BSN   Kim Clayton (Scheduling)  Nurse Care Coordinator     Clinic   Adult Congenital and CV Genetics              Adult Congenital and CV Genetics  [facility]              [facility]        For after hours urgent needs, call [phone] and ask to speak to the \"On-Call Cardiologist.\"    For emergencies call 911.      ____________________________________________________________________________________________________    Additional Important Information for Your Heart Health      General Cardiac " Recommendations:  Continue to eat a heart healthy, low salt diet.  Continue to get 20-30 minutes of aerobic activity, 4-5 days per week.  Examples of aerobic activity include walking, running, swimming, cycling, etc.  Continue to observe good oral hygiene, with regular dental visits.        SBE prophylaxis (antibiotics needed before dental appointments):   Yes____  No_X___    SBE prophylaxis applies to patients with certain heart conditions who are recommended to take antibiotics before dental appointments and other specific procedures. These antibiotics are to help prevent an infection of the heart (endocarditis) that certain patients are at higher risk of developing. The guidelines used come from the American Heart Association and are periodically updated.          FASTING CHOLESTEROL was checked in the last 5 years YES__X__  NO____ (2023)  If no, please follow up with your primary care physician. You should have a cholesterol screening every 5 years at minimum, and every year if taking a medication for your cholesterol levels.

## 2025-07-10 NOTE — NURSING NOTE
Cardiac Monitors: Patient was instructed regarding the indication, function, care and prompt return of a holter  monitor. The monitor was placed on the patient with instructions regarding care of the skin electrodes and monitor, as well as documentation in the patient diary.     Patient demonstrated understanding of this information and agreed to call with further questions or concerns.    Cardiac Testing: Patient given instructions regarding  echocardiogram . Discussed purpose, preparation, procedure and when to expect results reported back to the patient. Patient demonstrated understanding of this information and agreed to call with further questions or concerns.    Return Appointment: Patient given instructions regarding scheduling next clinic visit. Patient demonstrated understanding of this information and agreed to call with further questions or concerns.    Patient stated he understood all health information given and agreed to call with further questions or concerns.

## 2025-07-14 ENCOUNTER — PATIENT OUTREACH (OUTPATIENT)
Dept: CARE COORDINATION | Facility: CLINIC | Age: 75
End: 2025-07-14
Payer: MEDICARE

## 2025-07-16 ENCOUNTER — HOSPITAL ENCOUNTER (OUTPATIENT)
Dept: CARDIOLOGY | Facility: CLINIC | Age: 75
Discharge: HOME | End: 2025-07-16
Attending: INTERNAL MEDICINE
Payer: MEDICARE

## 2025-07-16 VITALS
WEIGHT: 262.1 LBS | OXYGEN SATURATION: 96 % | SYSTOLIC BLOOD PRESSURE: 98 MMHG | HEART RATE: 85 BPM | DIASTOLIC BLOOD PRESSURE: 70 MMHG | BODY MASS INDEX: 39.72 KG/M2 | HEIGHT: 68 IN

## 2025-07-16 VITALS — SYSTOLIC BLOOD PRESSURE: 98 MMHG | HEART RATE: 93 BPM | DIASTOLIC BLOOD PRESSURE: 60 MMHG

## 2025-07-16 DIAGNOSIS — I48.0 PAROXYSMAL ATRIAL FIBRILLATION (H): ICD-10-CM

## 2025-07-16 DIAGNOSIS — I21.4 NSTEMI (NON-ST ELEVATED MYOCARDIAL INFARCTION) (H): Primary | ICD-10-CM

## 2025-07-16 DIAGNOSIS — Q23.81 BICUSPID AORTIC VALVE: ICD-10-CM

## 2025-07-16 DIAGNOSIS — I77.819 AORTIC DILATATION: ICD-10-CM

## 2025-07-16 DIAGNOSIS — Q24.9 ADULT CONGENITAL HEART DISEASE: ICD-10-CM

## 2025-07-16 DIAGNOSIS — I10 BENIGN ESSENTIAL HYPERTENSION: ICD-10-CM

## 2025-07-16 DIAGNOSIS — I35.0 AORTIC VALVE STENOSIS, ETIOLOGY OF CARDIAC VALVE DISEASE UNSPECIFIED: ICD-10-CM

## 2025-07-16 LAB
CV BLOOD PRESSURE: 68 MMHG
CV STRESS MAX HR HE: 111
NUC STRESS EJECTION FRACTION: 57 %
RATE PRESSURE PRODUCT: NORMAL
STRESS ECHO BASELINE DIASTOLIC HE: 60
STRESS ECHO BASELINE HR: 93 BPM
STRESS ECHO BASELINE SYSTOLIC BP: 98
STRESS ECHO CALCULATED PERCENT HR: 76 %
STRESS ECHO LAST STRESS DIASTOLIC BP: 60
STRESS ECHO LAST STRESS SYSTOLIC BP: 100
STRESS ECHO TARGET HR: 147

## 2025-07-16 PROCEDURE — 93017 CV STRESS TEST TRACING ONLY: CPT

## 2025-07-16 PROCEDURE — 343N000001 HC RX 343 MED OP 636: Performed by: INTERNAL MEDICINE

## 2025-07-16 PROCEDURE — 250N000011 HC RX IP 250 OP 636: Performed by: INTERNAL MEDICINE

## 2025-07-16 PROCEDURE — A9502 TC99M TETROFOSMIN: HCPCS | Performed by: INTERNAL MEDICINE

## 2025-07-16 RX ORDER — DIAZEPAM 5 MG/1
5 TABLET ORAL EVERY 30 MIN PRN
Status: DISCONTINUED | OUTPATIENT
Start: 2025-07-16 | End: 2025-07-17 | Stop reason: HOSPADM

## 2025-07-16 RX ORDER — LIDOCAINE 40 MG/G
CREAM TOPICAL
Status: DISCONTINUED | OUTPATIENT
Start: 2025-07-16 | End: 2025-07-17 | Stop reason: HOSPADM

## 2025-07-16 RX ORDER — NITROGLYCERIN 0.4 MG/1
0.4 TABLET SUBLINGUAL EVERY 5 MIN PRN
Status: DISCONTINUED | OUTPATIENT
Start: 2025-07-16 | End: 2025-07-16 | Stop reason: HOSPADM

## 2025-07-16 RX ORDER — AMINOPHYLLINE 25 MG/ML
50-100 INJECTION, SOLUTION INTRAVENOUS
Status: DISCONTINUED | OUTPATIENT
Start: 2025-07-16 | End: 2025-07-17 | Stop reason: HOSPADM

## 2025-07-16 RX ORDER — LORAZEPAM 0.5 MG/1
0.5 TABLET ORAL EVERY 30 MIN PRN
Status: DISCONTINUED | OUTPATIENT
Start: 2025-07-16 | End: 2025-07-17 | Stop reason: HOSPADM

## 2025-07-16 RX ORDER — REGADENOSON 0.08 MG/ML
0.4 INJECTION, SOLUTION INTRAVENOUS ONCE
Status: COMPLETED | OUTPATIENT
Start: 2025-07-16 | End: 2025-07-16

## 2025-07-16 RX ORDER — ALBUTEROL SULFATE 90 UG/1
2 INHALANT RESPIRATORY (INHALATION) EVERY 5 MIN PRN
Status: DISCONTINUED | OUTPATIENT
Start: 2025-07-16 | End: 2025-07-17 | Stop reason: HOSPADM

## 2025-07-16 RX ORDER — CAFFEINE CITRATE 20 MG/ML
60 SOLUTION INTRAVENOUS
Status: DISCONTINUED | OUTPATIENT
Start: 2025-07-16 | End: 2025-07-16 | Stop reason: HOSPADM

## 2025-07-16 RX ORDER — SODIUM CHLORIDE 9 MG/ML
INJECTION, SOLUTION INTRAVENOUS CONTINUOUS PRN
Status: DISCONTINUED | OUTPATIENT
Start: 2025-07-16 | End: 2025-07-16 | Stop reason: HOSPADM

## 2025-07-16 RX ORDER — CAFFEINE 200 MG
200 TABLET ORAL
Status: DISCONTINUED | OUTPATIENT
Start: 2025-07-16 | End: 2025-07-16 | Stop reason: HOSPADM

## 2025-07-16 RX ADMIN — TETROFOSMIN 9.35 MILLICURIE: 1.38 INJECTION, POWDER, LYOPHILIZED, FOR SOLUTION INTRAVENOUS at 12:28

## 2025-07-16 RX ADMIN — REGADENOSON 0.4 MG: 0.08 INJECTION, SOLUTION INTRAVENOUS at 14:00

## 2025-07-16 RX ADMIN — TETROFOSMIN 27.4 MILLICURIE: 1.38 INJECTION, POWDER, LYOPHILIZED, FOR SOLUTION INTRAVENOUS at 14:07

## 2025-07-18 ENCOUNTER — TELEPHONE (OUTPATIENT)
Dept: CARDIOLOGY | Facility: CLINIC | Age: 75
End: 2025-07-18
Payer: MEDICARE

## 2025-07-18 NOTE — TELEPHONE ENCOUNTER
Date: 7/18/2025    Time of Call: 4:10 PM     Diagnosis:  hypotension     [ TORB ] Ordering provider: HILDA Nava MD    Order: hold amlodipine, furosemide and lisinopril, if SBP goves over 120 can add back 20 mg of lisinopril     Order received by: Sindhu Brito RN       Follow-up/additional notes: updated patient, Bps are still 93/63 despite holding amlodipine and furosemide for 2 days. Patient verbalized understanding and is in agreement to the plan.    Will check in with pt on Monday and discuss with Dr Du for long term plan

## 2025-07-18 NOTE — TELEPHONE ENCOUNTER
Patient left message asking to speak with a member of his care team. He has concerns about recent increasing symptoms. Reports recent low blood pressures and weight loss, and has concerns about his medications regarding that. Please call the patient back to discuss. Thank you!

## 2025-07-21 NOTE — TELEPHONE ENCOUNTER
Spoke with patient, he has been feeling better.     BP's are as followin//74. This am 112/71.     Still holding the lasix, amlodipine and lisinopril. Does not have swelling yet, today's weight was 246 lbs but has been losing weight in general. Will update Dr Du to advise on long term plan with medications

## 2025-07-21 NOTE — TELEPHONE ENCOUNTER
SPINE PATIENTS - NEW PROTOCOL PREVISIT     RECORDS RECEVEIVED FROM: self ref    REASON FOR VISIT: mid back pain, diagnosed with spinal stenosis in 2011 @ [facility]   PROVIDER: Chang   DATE OF APPT: 08/20/2025     NOTES (FOR ALL VISITS) STATUS DETAILS   OFFICE NOTE from referring provider  Referral and notes in chart   OFFICE NOTE from other specialist     DISCHARGE SUMMARY from hospital     DISCHARGE REPORT from ER     OPERATIVE REPORT     EMG REPORT     Injection     Physical therapy       IMAGING  (FOR ALL VISITS) STATUS DETAILS   MRI (HEAD, NECK, SPINE)     XRAY (SPINE) *NEUROSURGERY*     CT (HEAD, NECK, SPINE) internal CTA 7/10/2025 @ [facility]         Does patient have C2C?  Year last updated Action     YES   [x]   2012   Please update at appointment if outdated more than 5 years       NO     []   N/A   Please complete C2C at appointment

## 2025-07-24 NOTE — TELEPHONE ENCOUNTER
Date: 7/24/2025    Time of Call: 9:09 AM     Diagnosis:  hypotension     [ TORB ] Ordering provider: SERGEY Du MD    Order: start furosemide back while taking Bps, continue to hold lisinopril and amlodipine, if Bps greater than 120/80 call to adjust medications more     Order received by: Sindhu Brito RN       Follow-up/additional notes: LVM and sent mychart    Ok to do as planned with BP meds.   Lexiscan and CT looked good.

## 2025-08-18 DIAGNOSIS — I25.10 CORONARY ARTERY DISEASE INVOLVING NATIVE CORONARY ARTERY OF NATIVE HEART WITHOUT ANGINA PECTORIS: ICD-10-CM

## 2025-08-18 DIAGNOSIS — E11.59 TYPE 2 DIABETES MELLITUS WITH OTHER CIRCULATORY COMPLICATION, WITHOUT LONG-TERM CURRENT USE OF INSULIN (H): ICD-10-CM

## 2025-08-18 DIAGNOSIS — E66.01 MORBID OBESITY WITH BMI OF 40.0-44.9, ADULT (H): ICD-10-CM

## 2025-08-19 RX ORDER — TIRZEPATIDE 5 MG/.5ML
INJECTION, SOLUTION SUBCUTANEOUS
Qty: 2 ML | Refills: 0 | Status: SHIPPED | OUTPATIENT
Start: 2025-08-19

## 2025-08-20 ENCOUNTER — PRE VISIT (OUTPATIENT)
Dept: NEUROSURGERY | Facility: CLINIC | Age: 75
End: 2025-08-20

## 2025-08-20 ENCOUNTER — OFFICE VISIT (OUTPATIENT)
Dept: NEUROSURGERY | Facility: CLINIC | Age: 75
End: 2025-08-20
Payer: MEDICARE

## 2025-08-20 VITALS — SYSTOLIC BLOOD PRESSURE: 108 MMHG | OXYGEN SATURATION: 95 % | HEART RATE: 100 BPM | DIASTOLIC BLOOD PRESSURE: 77 MMHG

## 2025-08-20 DIAGNOSIS — G89.29 CHRONIC MIDLINE LOW BACK PAIN WITHOUT SCIATICA: Primary | ICD-10-CM

## 2025-08-20 DIAGNOSIS — M54.50 CHRONIC MIDLINE LOW BACK PAIN WITHOUT SCIATICA: Primary | ICD-10-CM

## 2025-08-20 ASSESSMENT — PAIN SCALES - GENERAL: PAINLEVEL_OUTOF10: MILD PAIN (2)
